# Patient Record
Sex: MALE | Race: WHITE | Employment: OTHER | URBAN - METROPOLITAN AREA
[De-identification: names, ages, dates, MRNs, and addresses within clinical notes are randomized per-mention and may not be internally consistent; named-entity substitution may affect disease eponyms.]

---

## 2017-01-09 ENCOUNTER — CARE COORDINATION (OUTPATIENT)
Dept: CARE COORDINATION | Age: 52
End: 2017-01-09

## 2017-01-17 ENCOUNTER — TELEPHONE (OUTPATIENT)
Dept: ADMISSION | Age: 52
End: 2017-01-17

## 2017-01-31 ENCOUNTER — TELEPHONE (OUTPATIENT)
Dept: INFECTIOUS DISEASES | Age: 52
End: 2017-01-31

## 2017-03-22 ENCOUNTER — TELEPHONE (OUTPATIENT)
Dept: INFECTIOUS DISEASES | Age: 52
End: 2017-03-22

## 2017-06-26 DIAGNOSIS — T50.902A SUICIDE ATTEMPT BY DRUG INGESTION, INITIAL ENCOUNTER (HCC): Primary | ICD-10-CM

## 2017-06-26 DIAGNOSIS — E78.9 LIPID DISORDER: ICD-10-CM

## 2017-06-26 DIAGNOSIS — B20 HUMAN IMMUNODEFICIENCY VIRUS INFECTION (HCC): ICD-10-CM

## 2017-06-26 DIAGNOSIS — Z13.220 LIPID SCREENING: ICD-10-CM

## 2017-06-26 DIAGNOSIS — E55.9 VITAMIN D DEFICIENCY: ICD-10-CM

## 2017-06-27 DIAGNOSIS — Z13.220 LIPID SCREENING: ICD-10-CM

## 2017-06-27 DIAGNOSIS — E78.9 LIPID DISORDER: ICD-10-CM

## 2017-06-27 DIAGNOSIS — B20 HUMAN IMMUNODEFICIENCY VIRUS INFECTION (HCC): ICD-10-CM

## 2017-06-27 DIAGNOSIS — T50.902A SUICIDE ATTEMPT BY DRUG INGESTION, INITIAL ENCOUNTER (HCC): ICD-10-CM

## 2017-06-27 DIAGNOSIS — E55.9 VITAMIN D DEFICIENCY: ICD-10-CM

## 2017-06-27 LAB
ALBUMIN SERPL-MCNC: 4.3 G/DL (ref 3.9–4.9)
ALP BLD-CCNC: 125 U/L (ref 35–104)
ALT SERPL-CCNC: 17 U/L (ref 0–41)
ANION GAP SERPL CALCULATED.3IONS-SCNC: 14 MEQ/L (ref 7–13)
AST SERPL-CCNC: 17 U/L (ref 0–40)
BILIRUB SERPL-MCNC: 0.3 MG/DL (ref 0–1.2)
BUN BLDV-MCNC: 13 MG/DL (ref 6–20)
CALCIUM SERPL-MCNC: 9.2 MG/DL (ref 8.6–10.2)
CHLORIDE BLD-SCNC: 98 MEQ/L (ref 98–107)
CHOLESTEROL, TOTAL: 179 MG/DL (ref 0–199)
CO2: 24 MEQ/L (ref 22–29)
CREAT SERPL-MCNC: 1.2 MG/DL (ref 0.7–1.2)
GFR AFRICAN AMERICAN: >60
GFR NON-AFRICAN AMERICAN: >60
GLOBULIN: 3.9 G/DL (ref 2.3–3.5)
GLUCOSE BLD-MCNC: 79 MG/DL (ref 74–109)
HCT VFR BLD CALC: 51.6 % (ref 42–52)
HDLC SERPL-MCNC: 36 MG/DL (ref 40–59)
HEMOGLOBIN: 17.3 G/DL (ref 14–18)
HEPATITIS C ANTIBODY INTERPRETATION: NORMAL
LDL CHOLESTEROL CALCULATED: 106 MG/DL (ref 0–129)
MCH RBC QN AUTO: 29.8 PG (ref 27–31.3)
MCHC RBC AUTO-ENTMCNC: 33.5 % (ref 33–37)
MCV RBC AUTO: 89 FL (ref 80–100)
PDW BLD-RTO: 13.7 % (ref 11.5–14.5)
PLATELET # BLD: 176 K/UL (ref 130–400)
POTASSIUM SERPL-SCNC: 5 MEQ/L (ref 3.5–5.1)
RBC # BLD: 5.8 M/UL (ref 4.7–6.1)
SODIUM BLD-SCNC: 136 MEQ/L (ref 132–144)
TOTAL PROTEIN: 8.2 G/DL (ref 6.4–8.1)
TRIGL SERPL-MCNC: 186 MG/DL (ref 0–200)
VITAMIN D 25-HYDROXY: 17.3 NG/ML (ref 30–100)
WBC # BLD: 6.4 K/UL (ref 4.8–10.8)

## 2017-06-28 LAB
ABSOLUTE CD 4 HELPER: 326 CELLS/UL (ref 430–1800)
C. TRACHOMATIS DNA ,URINE: NEGATIVE
CD4 % HELPER T CELL: 17 % (ref 32–64)
LYMPHOCYTE SUBSET PANEL 2 INFO: ABNORMAL
N. GONORRHOEAE DNA, URINE: NEGATIVE

## 2017-06-29 LAB
HIV RNA PCR INTERPRETATION: DETECTED
HIV-1 RNA BY PCR, QN: 4 LOG
HIV-1 RNA BY PCR, QN: ABNORMAL CPY/ML

## 2017-06-30 LAB
G6PD ALLELE 1: NEGATIVE
G6PD ALLELE 2: NORMAL
G6PD MUTATION INTERPRETATION: NORMAL
G6PD SPECIMEN: NORMAL

## 2017-07-03 LAB
EER HIV GENOTYPING: NORMAL
HIV-1 GENOTYPE: NORMAL

## 2017-07-17 ENCOUNTER — TELEPHONE (OUTPATIENT)
Dept: INFECTIOUS DISEASES | Age: 52
End: 2017-07-17

## 2017-07-19 ENCOUNTER — OFFICE VISIT (OUTPATIENT)
Dept: INFECTIOUS DISEASES | Age: 52
End: 2017-07-19

## 2017-07-19 VITALS
HEIGHT: 69 IN | BODY MASS INDEX: 32.14 KG/M2 | RESPIRATION RATE: 16 BRPM | HEART RATE: 75 BPM | WEIGHT: 217 LBS | TEMPERATURE: 97.9 F | SYSTOLIC BLOOD PRESSURE: 106 MMHG | DIASTOLIC BLOOD PRESSURE: 76 MMHG

## 2017-07-19 DIAGNOSIS — N18.2 CRF (CHRONIC RENAL FAILURE), STAGE 2 (MILD): ICD-10-CM

## 2017-07-19 DIAGNOSIS — B20 HUMAN IMMUNODEFICIENCY VIRUS INFECTION (HCC): Primary | ICD-10-CM

## 2017-07-19 DIAGNOSIS — F14.90 CRACK COCAINE USE: ICD-10-CM

## 2017-07-19 DIAGNOSIS — Z72.0 TOBACCO ABUSE: ICD-10-CM

## 2017-07-19 DIAGNOSIS — E55.9 VITAMIN D DEFICIENCY: ICD-10-CM

## 2017-07-19 DIAGNOSIS — F33.2 SEVERE EPISODE OF RECURRENT MAJOR DEPRESSIVE DISORDER, WITHOUT PSYCHOTIC FEATURES (HCC): ICD-10-CM

## 2017-07-19 DIAGNOSIS — T50.902A SUICIDE ATTEMPT BY DRUG INGESTION, INITIAL ENCOUNTER (HCC): ICD-10-CM

## 2017-07-19 PROCEDURE — G8427 DOCREV CUR MEDS BY ELIG CLIN: HCPCS | Performed by: INTERNAL MEDICINE

## 2017-07-19 PROCEDURE — 4004F PT TOBACCO SCREEN RCVD TLK: CPT | Performed by: INTERNAL MEDICINE

## 2017-07-19 PROCEDURE — 99215 OFFICE O/P EST HI 40 MIN: CPT | Performed by: INTERNAL MEDICINE

## 2017-07-19 PROCEDURE — G8417 CALC BMI ABV UP PARAM F/U: HCPCS | Performed by: INTERNAL MEDICINE

## 2017-07-19 PROCEDURE — 3017F COLORECTAL CA SCREEN DOC REV: CPT | Performed by: INTERNAL MEDICINE

## 2017-07-19 ASSESSMENT — PATIENT HEALTH QUESTIONNAIRE - PHQ9
SUM OF ALL RESPONSES TO PHQ QUESTIONS 1-9: 1
SUM OF ALL RESPONSES TO PHQ9 QUESTIONS 1 & 2: 1
2. FEELING DOWN, DEPRESSED OR HOPELESS: 1
1. LITTLE INTEREST OR PLEASURE IN DOING THINGS: 0

## 2017-10-17 DIAGNOSIS — B20 HUMAN IMMUNODEFICIENCY VIRUS INFECTION (HCC): ICD-10-CM

## 2017-10-17 LAB
ALBUMIN SERPL-MCNC: 4.3 G/DL (ref 3.9–4.9)
ALP BLD-CCNC: 126 U/L (ref 35–104)
ALT SERPL-CCNC: 21 U/L (ref 0–41)
ANION GAP SERPL CALCULATED.3IONS-SCNC: 15 MEQ/L (ref 7–13)
AST SERPL-CCNC: 22 U/L (ref 0–40)
BILIRUB SERPL-MCNC: 0.4 MG/DL (ref 0–1.2)
BUN BLDV-MCNC: 11 MG/DL (ref 6–20)
CALCIUM SERPL-MCNC: 9.7 MG/DL (ref 8.6–10.2)
CHLORIDE BLD-SCNC: 101 MEQ/L (ref 98–107)
CO2: 28 MEQ/L (ref 22–29)
CREAT SERPL-MCNC: 1.23 MG/DL (ref 0.7–1.2)
GFR AFRICAN AMERICAN: >60
GFR NON-AFRICAN AMERICAN: >60
GLOBULIN: 4.1 G/DL (ref 2.3–3.5)
GLUCOSE BLD-MCNC: 85 MG/DL (ref 74–109)
HCT VFR BLD CALC: 53.7 % (ref 42–52)
HEMOGLOBIN: 17.8 G/DL (ref 14–18)
MCH RBC QN AUTO: 29.4 PG (ref 27–31.3)
MCHC RBC AUTO-ENTMCNC: 33.2 % (ref 33–37)
MCV RBC AUTO: 88.7 FL (ref 80–100)
PDW BLD-RTO: 14.9 % (ref 11.5–14.5)
PLATELET # BLD: 188 K/UL (ref 130–400)
POTASSIUM SERPL-SCNC: 5.1 MEQ/L (ref 3.5–5.1)
RBC # BLD: 6.06 M/UL (ref 4.7–6.1)
SODIUM BLD-SCNC: 144 MEQ/L (ref 132–144)
TOTAL PROTEIN: 8.4 G/DL (ref 6.4–8.1)
WBC # BLD: 6.8 K/UL (ref 4.8–10.8)

## 2017-10-18 LAB
ABSOLUTE CD 4 HELPER: 309 CELLS/UL (ref 430–1800)
CD4 % HELPER T CELL: 20 % (ref 32–64)
LYMPHOCYTE SUBSET PANEL 2 INFO: ABNORMAL

## 2017-10-19 PROBLEM — L03.115 CELLULITIS OF RIGHT LOWER EXTREMITY: Status: ACTIVE | Noted: 2017-07-14

## 2017-10-19 LAB
HIV RNA PCR INTERPRETATION: DETECTED
HIV-1 RNA BY PCR, QN: 3.8 LOG
HIV-1 RNA BY PCR, QN: ABNORMAL CPY/ML

## 2017-10-19 RX ORDER — METHOCARBAMOL 750 MG/1
TABLET ORAL
Refills: 0 | COMMUNITY
Start: 2017-07-19 | End: 2018-10-09 | Stop reason: ALTCHOICE

## 2017-10-19 RX ORDER — SULFAMETHOXAZOLE AND TRIMETHOPRIM 800; 160 MG/1; MG/1
TABLET ORAL
Refills: 0 | COMMUNITY
Start: 2017-07-19 | End: 2018-03-09 | Stop reason: SDUPTHER

## 2017-10-19 RX ORDER — ABACAVIR SULFATE, DOLUTEGRAVIR SODIUM, LAMIVUDINE 600; 50; 300 MG/1; MG/1; MG/1
TABLET, FILM COATED ORAL
Refills: 0 | COMMUNITY
Start: 2017-07-20 | End: 2018-10-09

## 2017-10-20 ENCOUNTER — OFFICE VISIT (OUTPATIENT)
Dept: INFECTIOUS DISEASES | Age: 52
End: 2017-10-20

## 2017-10-20 VITALS
HEIGHT: 69 IN | DIASTOLIC BLOOD PRESSURE: 90 MMHG | SYSTOLIC BLOOD PRESSURE: 134 MMHG | HEART RATE: 105 BPM | TEMPERATURE: 97.5 F | RESPIRATION RATE: 20 BRPM | WEIGHT: 198.6 LBS | BODY MASS INDEX: 29.41 KG/M2

## 2017-10-20 DIAGNOSIS — E55.9 VITAMIN D DEFICIENCY: ICD-10-CM

## 2017-10-20 DIAGNOSIS — Z91.199 NONCOMPLIANCE: ICD-10-CM

## 2017-10-20 DIAGNOSIS — F14.90 CRACK COCAINE USE: ICD-10-CM

## 2017-10-20 DIAGNOSIS — L03.115 CELLULITIS OF RIGHT FOOT: Primary | ICD-10-CM

## 2017-10-20 DIAGNOSIS — B20 HIV (HUMAN IMMUNODEFICIENCY VIRUS INFECTION) (HCC): ICD-10-CM

## 2017-10-20 DIAGNOSIS — F32.A DEPRESSION, UNSPECIFIED DEPRESSION TYPE: ICD-10-CM

## 2017-10-20 DIAGNOSIS — L02.611 ABSCESS OF RIGHT FOOT: ICD-10-CM

## 2017-10-20 PROCEDURE — 3017F COLORECTAL CA SCREEN DOC REV: CPT | Performed by: INTERNAL MEDICINE

## 2017-10-20 PROCEDURE — 99215 OFFICE O/P EST HI 40 MIN: CPT | Performed by: INTERNAL MEDICINE

## 2017-10-20 PROCEDURE — 4004F PT TOBACCO SCREEN RCVD TLK: CPT | Performed by: INTERNAL MEDICINE

## 2017-10-20 PROCEDURE — G8427 DOCREV CUR MEDS BY ELIG CLIN: HCPCS | Performed by: INTERNAL MEDICINE

## 2017-10-20 PROCEDURE — G8417 CALC BMI ABV UP PARAM F/U: HCPCS | Performed by: INTERNAL MEDICINE

## 2017-10-20 PROCEDURE — G8484 FLU IMMUNIZE NO ADMIN: HCPCS | Performed by: INTERNAL MEDICINE

## 2017-10-20 RX ORDER — LEVOFLOXACIN 500 MG/1
500 TABLET, FILM COATED ORAL DAILY
Qty: 10 TABLET | Refills: 0 | Status: SHIPPED | OUTPATIENT
Start: 2017-10-20 | End: 2017-10-30

## 2017-10-20 NOTE — PROGRESS NOTES
Systems:    All 14 systems reviewed with patient and are negative other than as stated above. Medication History  Current Outpatient Prescriptions   Medication Sig Dispense Refill    D3-50 72226 units CAPS take 1 capsule by mouth weekly  0    TRIUMEQ 600- MG TABS   0    sulfamethoxazole-trimethoprim (BACTRIM DS;SEPTRA DS) 800-160 MG per tablet take 1 tablet by mouth three times a week (MONDAY, WEDNESDAY, FRIDAY)  0    sertraline (ZOLOFT) 50 MG tablet Take 1 tablet by mouth daily 15 tablet 3    albuterol sulfate HFA (VENTOLIN HFA) 108 (90 BASE) MCG/ACT inhaler Inhale 2 puffs into the lungs every 6 hours as needed for Wheezing 1 Inhaler 11     No current facility-administered medications for this visit.         Past Medical History  Past Medical History:   Diagnosis Date    Anxiety     [facility] [address]     Cellulitis of heel, right 2016    after surgery x 3 on tendion     Depression     Erectile dysfunction     Headache     HIV [facility] immunodeficiency virus infection) ([facility] [address] [facility]     Liver disease     Osteoarthritis        Past Surgical History  Past Surgical History:   Procedure Laterality Date    CHOLECYSTECTOMY      FOOT SURGERY      bone extraction    FRACTURE SURGERY         Allergies  Allergies   Allergen Reactions    Methadone Other (See Comments)     Chest pain    Morphine Other (See Comments)     Chest pains    Nexium [Esomeprazole Magnesium] Nausea Only     Upset stomach    Omeprazole Nausea Only     Upset stomach       Family History  Family History   Problem Relation Age of Onset    Family history unknown: Yes       Immunization History  Immunization History   Administered Date(s) Administered    Hepatitis B, unspecified formulation 06/09/2005, 07/01/2005, 12/02/2005    Influenza A (H1N1) Vaccine IM 11/25/2009    Influenza Vaccine, unspecified formulation 09/15/2014    Influenza Virus Vaccine 10/19/2011    Influenza Whole 01/26/2011    PPD Test 01/26/2011, 03/20/2012, 06/08/2015  Pneumococcal Conjugate 7-valent 03/16/2009    Tetanus 10/19/2006       Physical Exam  Vitals:    10/20/17 1404   BP: (!) 134/90   Pulse: 105   Resp: 20   Temp: 97.5 °F (36.4 °C)       General: Patient appears disheveled, weeping. Skin: rashes on his back - macular - look like a fungal rash. Not on anterior torso. HEENT: EOMI, Neck supple  Heart: S1 S2  Lungs: clear bilaterally to auscultation  Abdomen: soft, ND  Extrem:no calf pain  Neuro exam: CN II-XII intact    Current labs  Lab Results   Component Value Date    NX9WYLO 440 12/17/2012    CB1FVJF 683 06/08/2012    UM1OCRM 547 03/07/2012    TH4LYJB 611 12/12/2011         Chemistry        Component Value Date/Time     10/17/2017 1030    K 5.1 10/17/2017 1030     10/17/2017 1030    CO2 28 10/17/2017 1030    BUN 11 10/17/2017 1030    CREATININE 1.23 (H) 10/17/2017 1030        Component Value Date/Time    CALCIUM 9.7 10/17/2017 1030    ALKPHOS 126 (H) 10/17/2017 1030    AST 22 10/17/2017 1030    ALT 21 10/17/2017 1030    BILITOT 0.4 10/17/2017 1030          No components found for: CHLPL  Lab Results   Component Value Date    TRIG 186 06/27/2017    TRIG 144 11/20/2015    TRIG 155 06/05/2015     Lab Results   Component Value Date    HDL 36 (L) 06/27/2017    HDL 38 (L) 11/20/2015    HDL 29 (L) 06/05/2015     Lab Results   Component Value Date    LDLCALC 106 06/27/2017    LDLCALC 98 11/20/2015    LDLCALC 76 06/05/2015     No results found for: LABVLDL  Lab Results   Component Value Date    HEPBCAB NEGATIVE 12/12/2011     Lab Results   Component Value Date    RPR NON REAC 03/07/2012       Impression/Plan  Abscess of R heel area - very mild fluctuance of the area; no surrounding cellulitis. Patient should have repeat MRI and abx course. He said he will try to take it. HIV infection - uncontrolled with viremia. Stop all HIV meds as he is noncompliant at this time.    Suicide attempt history - will need to be vigilant about conversations regarding this

## 2017-11-15 ENCOUNTER — OFFICE VISIT (OUTPATIENT)
Dept: INFECTIOUS DISEASES | Age: 52
End: 2017-11-15

## 2017-11-15 VITALS
BODY MASS INDEX: 29.44 KG/M2 | DIASTOLIC BLOOD PRESSURE: 87 MMHG | TEMPERATURE: 98.2 F | WEIGHT: 198.8 LBS | SYSTOLIC BLOOD PRESSURE: 123 MMHG | HEIGHT: 69 IN | RESPIRATION RATE: 20 BRPM | HEART RATE: 96 BPM

## 2017-11-15 DIAGNOSIS — F32.A DEPRESSION WITH SUICIDAL IDEATION: ICD-10-CM

## 2017-11-15 DIAGNOSIS — L03.115 CELLULITIS OF RIGHT LOWER EXTREMITY: ICD-10-CM

## 2017-11-15 DIAGNOSIS — L02.611 ABSCESS OF RIGHT FOOT: ICD-10-CM

## 2017-11-15 DIAGNOSIS — F19.90 DRUG USE: ICD-10-CM

## 2017-11-15 DIAGNOSIS — B20 HUMAN IMMUNODEFICIENCY VIRUS INFECTION (HCC): Primary | ICD-10-CM

## 2017-11-15 DIAGNOSIS — R45.851 DEPRESSION WITH SUICIDAL IDEATION: ICD-10-CM

## 2017-11-15 PROCEDURE — 3017F COLORECTAL CA SCREEN DOC REV: CPT | Performed by: INTERNAL MEDICINE

## 2017-11-15 PROCEDURE — G8427 DOCREV CUR MEDS BY ELIG CLIN: HCPCS | Performed by: INTERNAL MEDICINE

## 2017-11-15 PROCEDURE — 4004F PT TOBACCO SCREEN RCVD TLK: CPT | Performed by: INTERNAL MEDICINE

## 2017-11-15 PROCEDURE — 99214 OFFICE O/P EST MOD 30 MIN: CPT | Performed by: INTERNAL MEDICINE

## 2017-11-15 PROCEDURE — G8417 CALC BMI ABV UP PARAM F/U: HCPCS | Performed by: INTERNAL MEDICINE

## 2017-11-15 PROCEDURE — G8484 FLU IMMUNIZE NO ADMIN: HCPCS | Performed by: INTERNAL MEDICINE

## 2017-11-15 NOTE — PROGRESS NOTES
Initial HIV Consult      Patient Name: Maggie Baker  YOB: 1965  Patient Sex: male  Medical Record Number: [id number]        Background:    Patient was diagnosed with HIV in 2000 in [place]. Had thrush at the time. HIs CD4 was noted to be 328 and genotype was negative for any resistance. Started on Combivir and Sustiva but quit taking meds a few years later. In 2007 was started on Atripla but stopped 3-4 years later. Then started taking Triumeq about 3 years ago but stopped taking that as well within a few years. Hx of crack use - $20 per day. Smokes 2-3 pacs of cigarettes at least per day. Has had over 100 sexual partners over his lifetime - males. Hx of multiple suicide attempts, including overdose of pills including lyrica, and still tells me that one day he intends to commit suicide when he is \"done with everything. \"  Does not like taking pills. States that he was seeing a psychiatrist but they put him on pills, which he did not take. And the pills only contributed to his depression. He has been institutionalized a few times for depression and suicide attempts. No STD history, he is edentulous, has a family hx of colon cancer and skin cancers. He himself recently had neck and lip squamous and basal cell cancers removed. Patient is weepy today - states that his drug problem is out of control. No IVDU. Smoking crack daily. Stopped taking all the rest of his medications. Wants inpatient rehab. No suicide plan. Does NOT want to go to the ER and adamantly refuses inpatient psych admission. Tells me that he has an abscess of the right achilles region. States that the pain, erythema and mild fluctuance started a few weeks ago. Tells me that he had OM of that foot at one point with tunneling and may have been subtreated. Has not had MRI recently. States that he also had a past L achilles injury. No fevers or chills. No surrounding erythema of the R achilles.      Review of Systems:    All 14 systems reviewed with patient and are negative other than as stated above. Medication History  Current Outpatient Prescriptions   Medication Sig Dispense Refill    D3-50 14683 units CAPS take 1 capsule by mouth weekly  0    TRIUMEQ 600- MG TABS   0    sulfamethoxazole-trimethoprim (BACTRIM DS;SEPTRA DS) 800-160 MG per tablet take 1 tablet by mouth three times a week (MONDAY, WEDNESDAY, FRIDAY)  0    sertraline (ZOLOFT) 50 MG tablet Take 1 tablet by mouth daily 15 tablet 3    albuterol sulfate HFA (VENTOLIN HFA) 108 (90 BASE) MCG/ACT inhaler Inhale 2 puffs into the lungs every 6 hours as needed for Wheezing 1 Inhaler 11     No current facility-administered medications for this visit.         Past Medical History  Past Medical History:   Diagnosis Date    Anxiety     [facility] ([facility] [address]     Cellulitis of heel, right 2016    after surgery x 3 on tendion     Depression     Erectile dysfunction     Headache(784.0)     HIV (human immunodeficiency virus infection) ([facility] [address]     Liver disease     Osteoarthritis        Past Surgical History  Past Surgical History:   Procedure Laterality Date    CHOLECYSTECTOMY      FOOT SURGERY      bone extraction    FRACTURE SURGERY         Allergies  Allergies   Allergen Reactions    Methadone Other (See Comments)     Chest pain    Morphine Other (See Comments)     Chest pains    Nexium [Esomeprazole Magnesium] Nausea Only     Upset stomach    Omeprazole Nausea Only     Upset stomach       Family History  Family History   Problem Relation Age of Onset    Family history unknown: Yes       Immunization History  Immunization History   Administered Date(s) Administered    Hepatitis B, unspecified formulation 06/09/2005, 07/01/2005, 12/02/2005    Influenza A (H1N1) Vaccine IM 11/25/2009    Influenza Vaccine, unspecified formulation 09/15/2014    Influenza Virus Vaccine 10/19/2011    Influenza Whole 01/26/2011    PPD Test 01/26/2011, 03/20/2012, 06/08/2015    Pneumococcal Conjugate 7-valent 03/16/2009    Tetanus 10/19/2006       Physical Exam  Vitals:    11/15/17 1312   BP: 123/87   Pulse:    Resp:    Temp:        General: Patient appears disheveled, weeping. Skin: rashes on his back - macular - look like a fungal rash. Not on anterior torso. HEENT: EOMI, Neck supple  Heart: S1 S2  Lungs: clear bilaterally to auscultation  Abdomen: soft, ND  Extrem:no calf pain  Neuro exam: CN II-XII intact    Current labs  Lab Results   Component Value Date    RA2TXVB 440 12/17/2012    XW1IKFT 683 06/08/2012    UA9ZVJD 547 03/07/2012    SB4MVEI 611 12/12/2011         Chemistry        Component Value Date/Time     10/17/2017 1030    K 5.1 10/17/2017 1030     10/17/2017 1030    CO2 28 10/17/2017 1030    BUN 11 10/17/2017 1030    CREATININE 1.23 (H) 10/17/2017 1030        Component Value Date/Time    CALCIUM 9.7 10/17/2017 1030    ALKPHOS 126 (H) 10/17/2017 1030    AST 22 10/17/2017 1030    ALT 21 10/17/2017 1030    BILITOT 0.4 10/17/2017 1030          No components found for: CHLPL  Lab Results   Component Value Date    TRIG 186 06/27/2017    TRIG 144 11/20/2015    TRIG 155 06/05/2015     Lab Results   Component Value Date    HDL 36 (L) 06/27/2017    HDL 38 (L) 11/20/2015    HDL 29 (L) 06/05/2015     Lab Results   Component Value Date    LDLCALC 106 06/27/2017    LDLCALC 98 11/20/2015    LDLCALC 76 06/05/2015     No results found for: LABVLDL  Lab Results   Component Value Date    HEPBCAB NEGATIVE 12/12/2011     Lab Results   Component Value Date    RPR NON REAC 03/07/2012       Impression/Plan  Abscess of R heel area - very mild fluctuance of the area; no surrounding cellulitis. Patient should have repeat MRI and abx course. He said he will try to take it. HIV infection - uncontrolled with viremia. Stop all HIV meds as he is noncompliant at this time.    Suicide attempt history - will need to be vigilant about conversations regarding this with this patient. Frequent check ins if needed. No plan  Crack use - this is the biggest issue - recommend ER and intake asap for major depression and drug use but patient refusing stating he wants inpatientt rehab. Not currently on any wait lists. Refuses outpatient rehab. Continues to use daily. Genotype reviewed - some NNRTI resistance. Hx of renal injury  Tobacco use  vitmain D deficiency - is not taking vitamin D     Plan to check CD4; Viral load   Plan to check basic labs:  CBC, Chemistry,   Plan to check genotype      Prophylaxis needed: Bactrim until his percent comes up       Prevention  Substance abuse screening performed:   Crack  Counseling provided during this visit > 50% yes  Brief Mental Health Screening performed:  Yes - suicide attempts  Physical abuse screening performed: yes - none    Patient has been informed of the importance of protection during sexual encounters which include, but are not limited to vaginal intercourse, anal intercourse, and oral sex. Patient has also been made aware that it is a felony in the [address] to engage in a sexual encounter without first disclosing HIV status to partner.     Plans for next visit:    Needs eye doc visit  Needs a colonoscopy due to family hx of colon cancer  Needs a PCP  Needs a dermatology referral due to skin cancers  Needs pain management    Follow up: 3 months    Time spent with patient: 36 min    Gwen Benavides

## 2017-11-15 NOTE — PROGRESS NOTES
illicits. Encouraged patient to discuss this with Dr Ellen Zeng. No fevers or chills. Review of Systems:    All 14 systems reviewed with patient and are negative other than as stated above. Medication History  Current Outpatient Prescriptions   Medication Sig Dispense Refill    D3-50 75996 units CAPS take 1 capsule by mouth weekly  0    TRIUMEQ 600- MG TABS   0    sulfamethoxazole-trimethoprim (BACTRIM DS;SEPTRA DS) 800-160 MG per tablet take 1 tablet by mouth three times a week (MONDAY, WEDNESDAY, FRIDAY)  0    sertraline (ZOLOFT) 50 MG tablet Take 1 tablet by mouth daily 15 tablet 3    albuterol sulfate HFA (VENTOLIN HFA) 108 (90 BASE) MCG/ACT inhaler Inhale 2 puffs into the lungs every 6 hours as needed for Wheezing 1 Inhaler 11     No current facility-administered medications for this visit.         Past Medical History  Past Medical History:   Diagnosis Date    Anxiety     [facility] ([facility] [address]     Cellulitis of heel, right 2016    after surgery x 3 on tendion     Depression     Erectile dysfunction     Headache(784.0)     HIV (human immunodeficiency virus infection) ([facility] [address]     Liver disease     Osteoarthritis        Past Surgical History  Past Surgical History:   Procedure Laterality Date    CHOLECYSTECTOMY      FOOT SURGERY      bone extraction    FRACTURE SURGERY         Allergies  Allergies   Allergen Reactions    Methadone Other (See Comments)     Chest pain    Morphine Other (See Comments)     Chest pains    Nexium [Esomeprazole Magnesium] Nausea Only     Upset stomach    Omeprazole Nausea Only     Upset stomach       Family History  Family History   Problem Relation Age of Onset    Family history unknown: Yes       Immunization History  Immunization History   Administered Date(s) Administered    Hepatitis B, unspecified formulation 06/09/2005, 07/01/2005, 12/02/2005    Influenza A (H1N1) Vaccine IM 11/25/2009    Influenza Vaccine, unspecified formulation 09/15/2014    infection - uncontrolled with viremia. Stopped all HIV meds as he is noncompliant at this time. I have asked that he resume his HIV meds especially if he will be undergoing surgery. Patient states that he will think about it. Suicide attempt history - will need to be vigilant about conversations regarding this with this patient. Frequent check ins if needed. No plan  Crack use - this is the biggest issue - recommend ER and intake asap for major depression and drug use but patient refusing. He is going back and forth between outpatient and inpatient rehab. Genotype reviewed - some NNRTI resistance. Hx of renal injury  Tobacco use  vitmain D deficiency - is not taking vitamin D     Plan to check CD4; Viral load   Plan to check basic labs:  CBC, Chemistry,   Plan to check genotype      Prophylaxis needed: Bactrim until his percent comes up       Prevention  Substance abuse screening performed:   Crack  Counseling provided during this visit > 50% yes  Brief Mental Health Screening performed:  Yes - suicide attempts  Physical abuse screening performed: yes - none    Patient has been informed of the importance of protection during sexual encounters which include, but are not limited to vaginal intercourse, anal intercourse, and oral sex. Patient has also been made aware that it is a felony in the [address] to engage in a sexual encounter without first disclosing HIV status to partner. Plans for next visit:    Needs eye doc visit  Needs a colonoscopy due to family hx of colon cancer  Needs a PCP  Needs a dermatology referral due to skin cancers  Needs pain management    Follow up: will need nurse visit in 3 weeks just to assess his mentation. Can follow up from HIV standpoint in 3 months.      Time spent with patient: 35 min    Qiana5 Jesus Sheikh

## 2017-11-20 ENCOUNTER — HOSPITAL ENCOUNTER (OUTPATIENT)
Dept: MRI IMAGING | Age: 52
Discharge: HOME | End: 2017-11-20
Payer: MEDICARE

## 2017-11-20 DIAGNOSIS — L03.115 CELLULITIS OF RIGHT FOOT: ICD-10-CM

## 2017-11-22 ENCOUNTER — CARE COORDINATION (OUTPATIENT)
Dept: CARE COORDINATION | Age: 52
End: 2017-11-22

## 2018-01-30 PROBLEM — F14.90 CRACK COCAINE USE: Status: ACTIVE | Noted: 2017-07-18

## 2018-01-30 PROBLEM — L03.90 CELLULITIS: Status: ACTIVE | Noted: 2017-06-26

## 2018-02-14 ENCOUNTER — NURSE ONLY (OUTPATIENT)
Dept: INFECTIOUS DISEASES | Age: 53
End: 2018-02-14

## 2018-02-14 DIAGNOSIS — Z01.89 ROUTINE LAB DRAW: Primary | ICD-10-CM

## 2018-02-14 DIAGNOSIS — Z12.5 ENCOUNTER FOR SCREENING FOR MALIGNANT NEOPLASM OF PROSTATE: ICD-10-CM

## 2018-02-14 DIAGNOSIS — B20 HUMAN IMMUNODEFICIENCY VIRUS INFECTION (HCC): ICD-10-CM

## 2018-02-14 DIAGNOSIS — B20 HUMAN IMMUNODEFICIENCY VIRUS INFECTION (HCC): Primary | ICD-10-CM

## 2018-02-14 LAB
ALBUMIN SERPL-MCNC: 4.3 G/DL (ref 3.9–4.9)
ALP BLD-CCNC: 116 U/L (ref 35–104)
ALT SERPL-CCNC: 27 U/L (ref 0–41)
ANION GAP SERPL CALCULATED.3IONS-SCNC: 22 MEQ/L (ref 7–13)
AST SERPL-CCNC: 39 U/L (ref 0–40)
BILIRUB SERPL-MCNC: 0.7 MG/DL (ref 0–1.2)
BUN BLDV-MCNC: 16 MG/DL (ref 6–20)
CALCIUM SERPL-MCNC: 9.6 MG/DL (ref 8.6–10.2)
CHLORIDE BLD-SCNC: 98 MEQ/L (ref 98–107)
CO2: 21 MEQ/L (ref 22–29)
CREAT SERPL-MCNC: 1.1 MG/DL (ref 0.7–1.2)
GFR AFRICAN AMERICAN: >60
GFR NON-AFRICAN AMERICAN: >60
GLOBULIN: 3.8 G/DL (ref 2.3–3.5)
GLUCOSE BLD-MCNC: 73 MG/DL (ref 74–109)
HCT VFR BLD CALC: 52.6 % (ref 42–52)
HEMOGLOBIN: 17.6 G/DL (ref 14–18)
MCH RBC QN AUTO: 30.6 PG (ref 27–31.3)
MCHC RBC AUTO-ENTMCNC: 33.4 % (ref 33–37)
MCV RBC AUTO: 91.5 FL (ref 80–100)
PDW BLD-RTO: 14.4 % (ref 11.5–14.5)
PLATELET # BLD: 200 K/UL (ref 130–400)
POTASSIUM SERPL-SCNC: 4.8 MEQ/L (ref 3.5–5.1)
PROSTATE SPECIFIC ANTIGEN: 0.44 NG/ML (ref 0–3.89)
RBC # BLD: 5.75 M/UL (ref 4.7–6.1)
SODIUM BLD-SCNC: 141 MEQ/L (ref 132–144)
TOTAL PROTEIN: 8.1 G/DL (ref 6.4–8.1)
WBC # BLD: 6.6 K/UL (ref 4.8–10.8)

## 2018-02-16 LAB
ABSOLUTE CD 4 HELPER: 301 CELLS/UL (ref 430–1800)
CD4 % HELPER T CELL: 18 % (ref 32–64)
LYMPHOCYTE SUBSET PANEL 2 INFO: ABNORMAL

## 2018-02-17 LAB
HIV RNA PCR INTERPRETATION: DETECTED
HIV-1 RNA BY PCR, QN: 3.5 LOG
HIV-1 RNA BY PCR, QN: ABNORMAL CPY/ML

## 2018-03-09 ENCOUNTER — OFFICE VISIT (OUTPATIENT)
Dept: INFECTIOUS DISEASES | Age: 53
End: 2018-03-09
Payer: MEDICARE

## 2018-03-09 VITALS
TEMPERATURE: 98.7 F | RESPIRATION RATE: 18 BRPM | HEART RATE: 67 BPM | SYSTOLIC BLOOD PRESSURE: 121 MMHG | DIASTOLIC BLOOD PRESSURE: 81 MMHG | HEIGHT: 69 IN | WEIGHT: 176.4 LBS | BODY MASS INDEX: 26.13 KG/M2

## 2018-03-09 DIAGNOSIS — M86.271 SUBACUTE OSTEOMYELITIS OF RIGHT FOOT (HCC): Primary | ICD-10-CM

## 2018-03-09 DIAGNOSIS — B20 HUMAN IMMUNODEFICIENCY VIRUS INFECTION (HCC): ICD-10-CM

## 2018-03-09 DIAGNOSIS — E78.9 LIPID DISORDER: ICD-10-CM

## 2018-03-09 DIAGNOSIS — E55.9 VITAMIN D DEFICIENCY: ICD-10-CM

## 2018-03-09 PROCEDURE — 99215 OFFICE O/P EST HI 40 MIN: CPT | Performed by: INTERNAL MEDICINE

## 2018-03-09 PROCEDURE — G8427 DOCREV CUR MEDS BY ELIG CLIN: HCPCS | Performed by: INTERNAL MEDICINE

## 2018-03-09 PROCEDURE — 4004F PT TOBACCO SCREEN RCVD TLK: CPT | Performed by: INTERNAL MEDICINE

## 2018-03-09 PROCEDURE — G8417 CALC BMI ABV UP PARAM F/U: HCPCS | Performed by: INTERNAL MEDICINE

## 2018-03-09 PROCEDURE — G8484 FLU IMMUNIZE NO ADMIN: HCPCS | Performed by: INTERNAL MEDICINE

## 2018-03-09 PROCEDURE — 3017F COLORECTAL CA SCREEN DOC REV: CPT | Performed by: INTERNAL MEDICINE

## 2018-03-09 RX ORDER — DOXYCYCLINE 100 MG/1
100 CAPSULE ORAL 2 TIMES DAILY
Qty: 120 CAPSULE | Refills: 0 | Status: SHIPPED | OUTPATIENT
Start: 2018-03-09 | End: 2018-05-08

## 2018-03-09 RX ORDER — SULFAMETHOXAZOLE AND TRIMETHOPRIM 800; 160 MG/1; MG/1
TABLET ORAL
Qty: 90 TABLET | Refills: 0 | Status: SHIPPED | OUTPATIENT
Start: 2018-03-09 | End: 2018-10-18 | Stop reason: ALTCHOICE

## 2018-03-14 ENCOUNTER — NURSE ONLY (OUTPATIENT)
Dept: INFECTIOUS DISEASES | Age: 53
End: 2018-03-14

## 2018-03-14 DIAGNOSIS — B20 HUMAN IMMUNODEFICIENCY VIRUS INFECTION (HCC): ICD-10-CM

## 2018-03-14 DIAGNOSIS — Z01.89 ROUTINE LAB DRAW: Primary | ICD-10-CM

## 2018-03-14 DIAGNOSIS — M86.271 SUBACUTE OSTEOMYELITIS OF RIGHT FOOT (HCC): ICD-10-CM

## 2018-03-14 DIAGNOSIS — E55.9 VITAMIN D DEFICIENCY: ICD-10-CM

## 2018-03-14 DIAGNOSIS — E78.9 LIPID DISORDER: ICD-10-CM

## 2018-03-14 LAB
ALBUMIN SERPL-MCNC: 3.6 G/DL (ref 3.9–4.9)
ALP BLD-CCNC: 107 U/L (ref 35–104)
ALT SERPL-CCNC: 14 U/L (ref 0–41)
ANION GAP SERPL CALCULATED.3IONS-SCNC: 16 MEQ/L (ref 7–13)
AST SERPL-CCNC: 17 U/L (ref 0–40)
BASOPHILS ABSOLUTE: 0 K/UL (ref 0–0.2)
BASOPHILS RELATIVE PERCENT: 0.7 %
BILIRUB SERPL-MCNC: 0.4 MG/DL (ref 0–1.2)
BUN BLDV-MCNC: 11 MG/DL (ref 6–20)
C-REACTIVE PROTEIN: 9.3 MG/L (ref 0–5)
CALCIUM SERPL-MCNC: 8.7 MG/DL (ref 8.6–10.2)
CHLORIDE BLD-SCNC: 98 MEQ/L (ref 98–107)
CHOLESTEROL, TOTAL: 124 MG/DL (ref 0–199)
CO2: 26 MEQ/L (ref 22–29)
CREAT SERPL-MCNC: 0.93 MG/DL (ref 0.7–1.2)
EOSINOPHILS ABSOLUTE: 0.1 K/UL (ref 0–0.7)
EOSINOPHILS RELATIVE PERCENT: 2.3 %
GFR AFRICAN AMERICAN: >60
GFR NON-AFRICAN AMERICAN: >60
GLOBULIN: 3.8 G/DL (ref 2.3–3.5)
GLUCOSE BLD-MCNC: 57 MG/DL (ref 74–109)
HCT VFR BLD CALC: 49.5 % (ref 42–52)
HDLC SERPL-MCNC: 28 MG/DL (ref 40–59)
HEMOGLOBIN: 16.4 G/DL (ref 14–18)
HEPATITIS C ANTIBODY INTERPRETATION: NORMAL
LDL CHOLESTEROL CALCULATED: 62 MG/DL (ref 0–129)
LYMPHOCYTES ABSOLUTE: 1.3 K/UL (ref 1–4.8)
LYMPHOCYTES RELATIVE PERCENT: 26.6 %
MCH RBC QN AUTO: 30.1 PG (ref 27–31.3)
MCHC RBC AUTO-ENTMCNC: 33.1 % (ref 33–37)
MCV RBC AUTO: 91.1 FL (ref 80–100)
MONOCYTES ABSOLUTE: 0.5 K/UL (ref 0.2–0.8)
MONOCYTES RELATIVE PERCENT: 10.4 %
NEUTROPHILS ABSOLUTE: 2.9 K/UL (ref 1.4–6.5)
NEUTROPHILS RELATIVE PERCENT: 60 %
PDW BLD-RTO: 14.4 % (ref 11.5–14.5)
PLATELET # BLD: 174 K/UL (ref 130–400)
POTASSIUM SERPL-SCNC: 3.9 MEQ/L (ref 3.5–5.1)
RBC # BLD: 5.44 M/UL (ref 4.7–6.1)
SODIUM BLD-SCNC: 140 MEQ/L (ref 132–144)
TOTAL PROTEIN: 7.4 G/DL (ref 6.4–8.1)
TRIGL SERPL-MCNC: 170 MG/DL (ref 0–200)
VITAMIN D 25-HYDROXY: 18.1 NG/ML (ref 30–100)
WBC # BLD: 4.8 K/UL (ref 4.8–10.8)

## 2018-03-15 LAB — RPR: NORMAL

## 2018-03-16 LAB
ABSOLUTE CD 4 HELPER: 254 CELLS/UL (ref 430–1800)
CD4 % HELPER T CELL: 17 % (ref 32–64)
HIV RNA PCR INTERPRETATION: DETECTED
HIV-1 RNA BY PCR, QN: 3.6 LOG
HIV-1 RNA BY PCR, QN: ABNORMAL CPY/ML
LYMPHOCYTE SUBSET PANEL 2 INFO: ABNORMAL

## 2018-03-17 LAB
QUANTIFERON (R) TB GOLD (INCUBATED): NEGATIVE
QUANTIFERON MITOGEN: >10 IU/ML
QUANTIFERON NIL: 0.12 IU/ML
QUANTIFERON TB AG MINUS NIL: 0.02 IU/ML (ref 0–0.34)

## 2018-03-28 ENCOUNTER — NURSE ONLY (OUTPATIENT)
Dept: INFECTIOUS DISEASES | Age: 53
End: 2018-03-28

## 2018-03-28 DIAGNOSIS — Z01.89 ROUTINE LAB DRAW: Primary | ICD-10-CM

## 2018-03-28 DIAGNOSIS — B20 HUMAN IMMUNODEFICIENCY VIRUS INFECTION (HCC): Primary | ICD-10-CM

## 2018-03-28 DIAGNOSIS — B20 HUMAN IMMUNODEFICIENCY VIRUS INFECTION (HCC): ICD-10-CM

## 2018-03-28 LAB
ALBUMIN SERPL-MCNC: 3.9 G/DL (ref 3.9–4.9)
ALP BLD-CCNC: 106 U/L (ref 35–104)
ALT SERPL-CCNC: 12 U/L (ref 0–41)
ANION GAP SERPL CALCULATED.3IONS-SCNC: 13 MEQ/L (ref 7–13)
AST SERPL-CCNC: 16 U/L (ref 0–40)
BILIRUB SERPL-MCNC: 0.3 MG/DL (ref 0–1.2)
BUN BLDV-MCNC: 11 MG/DL (ref 6–20)
CALCIUM SERPL-MCNC: 9 MG/DL (ref 8.6–10.2)
CHLORIDE BLD-SCNC: 100 MEQ/L (ref 98–107)
CO2: 29 MEQ/L (ref 22–29)
CREAT SERPL-MCNC: 1.09 MG/DL (ref 0.7–1.2)
GFR AFRICAN AMERICAN: >60
GFR NON-AFRICAN AMERICAN: >60
GLOBULIN: 3.7 G/DL (ref 2.3–3.5)
GLUCOSE BLD-MCNC: 99 MG/DL (ref 74–109)
HCT VFR BLD CALC: 49.3 % (ref 42–52)
HEMOGLOBIN: 16.5 G/DL (ref 14–18)
MCH RBC QN AUTO: 30.5 PG (ref 27–31.3)
MCHC RBC AUTO-ENTMCNC: 33.4 % (ref 33–37)
MCV RBC AUTO: 91.1 FL (ref 80–100)
PDW BLD-RTO: 14.5 % (ref 11.5–14.5)
PLATELET # BLD: 172 K/UL (ref 130–400)
POTASSIUM SERPL-SCNC: 4.6 MEQ/L (ref 3.5–5.1)
RBC # BLD: 5.41 M/UL (ref 4.7–6.1)
SODIUM BLD-SCNC: 142 MEQ/L (ref 132–144)
TOTAL PROTEIN: 7.6 G/DL (ref 6.4–8.1)
WBC # BLD: 5.3 K/UL (ref 4.8–10.8)

## 2018-09-19 ENCOUNTER — TELEPHONE (OUTPATIENT)
Dept: INFECTIOUS DISEASES | Age: 53
End: 2018-09-19

## 2018-09-19 NOTE — TELEPHONE ENCOUNTER
Pt called in to notify office he will be returning to [place] October 4th. He requested apt with RN and CM to reestablish care. Pt aware to bring all updated information possible such as ID Insurance and residency. He stated he was to secluded in [place]. Stated he has been seeing hiv MD Dr Lizette Sommer. He has not been started on any hiv medications yet. Pt stated labs were completed last month. will attempt to get records.  [employer] office [phone]. Other wise pt denies needs and will call with further questions or problems.

## 2018-10-08 ENCOUNTER — HOSPITAL ENCOUNTER (EMERGENCY)
Age: 53
Discharge: HOME | End: 2018-10-08
Attending: EMERGENCY MEDICINE
Payer: MEDICARE

## 2018-10-08 ENCOUNTER — APPOINTMENT (OUTPATIENT)
Dept: GENERAL RADIOLOGY | Age: 53
End: 2018-10-08
Payer: MEDICARE

## 2018-10-08 VITALS
SYSTOLIC BLOOD PRESSURE: 106 MMHG | HEIGHT: 69 IN | TEMPERATURE: 98.8 F | WEIGHT: 200 LBS | HEART RATE: 87 BPM | OXYGEN SATURATION: 95 % | RESPIRATION RATE: 16 BRPM | DIASTOLIC BLOOD PRESSURE: 65 MMHG | BODY MASS INDEX: 29.62 KG/M2

## 2018-10-08 DIAGNOSIS — J44.1 COPD EXACERBATION (HCC): Primary | ICD-10-CM

## 2018-10-08 LAB
ALBUMIN SERPL-MCNC: 3.7 G/DL (ref 3.9–4.9)
ALP BLD-CCNC: 86 U/L (ref 35–104)
ALT SERPL-CCNC: 9 U/L (ref 0–41)
ANION GAP SERPL CALCULATED.3IONS-SCNC: 11 MEQ/L (ref 7–13)
AST SERPL-CCNC: 15 U/L (ref 0–40)
BACTERIA: NORMAL /HPF
BILIRUB SERPL-MCNC: 0.9 MG/DL (ref 0–1.2)
BILIRUBIN URINE: ABNORMAL
BLOOD, URINE: ABNORMAL
BUN BLDV-MCNC: 11 MG/DL (ref 6–20)
CALCIUM SERPL-MCNC: 8.9 MG/DL (ref 8.6–10.2)
CHLORIDE BLD-SCNC: 94 MEQ/L (ref 98–107)
CLARITY: CLEAR
CO2: 28 MEQ/L (ref 22–29)
COLOR: ABNORMAL
CREAT SERPL-MCNC: 1.01 MG/DL (ref 0.7–1.2)
GFR AFRICAN AMERICAN: >60
GFR NON-AFRICAN AMERICAN: >60
GLOBULIN: 4 G/DL (ref 2.3–3.5)
GLUCOSE BLD-MCNC: 94 MG/DL (ref 74–109)
GLUCOSE URINE: NEGATIVE MG/DL
HCT VFR BLD CALC: 44.7 % (ref 42–52)
HEMOGLOBIN: 15.4 G/DL (ref 14–18)
KETONES, URINE: ABNORMAL MG/DL
LACTIC ACID: 1 MMOL/L (ref 0.5–2.2)
LEUKOCYTE ESTERASE, URINE: ABNORMAL
MCH RBC QN AUTO: 30.4 PG (ref 27–31.3)
MCHC RBC AUTO-ENTMCNC: 34.5 % (ref 33–37)
MCV RBC AUTO: 88 FL (ref 80–100)
MUCUS: PRESENT
NITRITE, URINE: NEGATIVE
PDW BLD-RTO: 13.3 % (ref 11.5–14.5)
PH UA: 5.5 (ref 5–9)
PLATELET # BLD: 165 K/UL (ref 130–400)
POTASSIUM SERPL-SCNC: 3.3 MEQ/L (ref 3.5–5.1)
PROTEIN UA: 30 MG/DL
RBC # BLD: 5.08 M/UL (ref 4.7–6.1)
RBC UA: NORMAL /HPF (ref 0–2)
SODIUM BLD-SCNC: 133 MEQ/L (ref 132–144)
SPECIFIC GRAVITY UA: 1.02 (ref 1–1.03)
TOTAL PROTEIN: 7.7 G/DL (ref 6.4–8.1)
URINE REFLEX TO CULTURE: YES
UROBILINOGEN, URINE: 2 E.U./DL
WBC # BLD: 7.8 K/UL (ref 4.8–10.8)
WBC UA: NORMAL /HPF (ref 0–5)

## 2018-10-08 PROCEDURE — 83605 ASSAY OF LACTIC ACID: CPT

## 2018-10-08 PROCEDURE — 87040 BLOOD CULTURE FOR BACTERIA: CPT

## 2018-10-08 PROCEDURE — 85027 COMPLETE CBC AUTOMATED: CPT

## 2018-10-08 PROCEDURE — 2580000003 HC RX 258: Performed by: EMERGENCY MEDICINE

## 2018-10-08 PROCEDURE — 99283 EMERGENCY DEPT VISIT LOW MDM: CPT

## 2018-10-08 PROCEDURE — 87086 URINE CULTURE/COLONY COUNT: CPT

## 2018-10-08 PROCEDURE — 6370000000 HC RX 637 (ALT 250 FOR IP): Performed by: EMERGENCY MEDICINE

## 2018-10-08 PROCEDURE — 80053 COMPREHEN METABOLIC PANEL: CPT

## 2018-10-08 PROCEDURE — 36415 COLL VENOUS BLD VENIPUNCTURE: CPT

## 2018-10-08 PROCEDURE — 81001 URINALYSIS AUTO W/SCOPE: CPT

## 2018-10-08 PROCEDURE — 71045 X-RAY EXAM CHEST 1 VIEW: CPT

## 2018-10-08 PROCEDURE — 94640 AIRWAY INHALATION TREATMENT: CPT

## 2018-10-08 RX ORDER — 0.9 % SODIUM CHLORIDE 0.9 %
1000 INTRAVENOUS SOLUTION INTRAVENOUS ONCE
Status: COMPLETED | OUTPATIENT
Start: 2018-10-08 | End: 2018-10-08

## 2018-10-08 RX ORDER — AMOXICILLIN 875 MG/1
875 TABLET, COATED ORAL 2 TIMES DAILY
COMMUNITY
End: 2018-10-09 | Stop reason: ALTCHOICE

## 2018-10-08 RX ORDER — ALBUTEROL SULFATE 90 UG/1
AEROSOL, METERED RESPIRATORY (INHALATION)
Qty: 1 INHALER | Refills: 1 | Status: SHIPPED | OUTPATIENT
Start: 2018-10-08 | End: 2018-10-18 | Stop reason: SDUPTHER

## 2018-10-08 RX ORDER — PREDNISONE 10 MG/1
60 TABLET ORAL DAILY
Qty: 30 TABLET | Refills: 0 | Status: SHIPPED | OUTPATIENT
Start: 2018-10-08 | End: 2018-10-13

## 2018-10-08 RX ORDER — IPRATROPIUM BROMIDE AND ALBUTEROL SULFATE 2.5; .5 MG/3ML; MG/3ML
1 SOLUTION RESPIRATORY (INHALATION) PRN
Status: DISCONTINUED | OUTPATIENT
Start: 2018-10-08 | End: 2018-10-08 | Stop reason: HOSPADM

## 2018-10-08 RX ADMIN — SODIUM CHLORIDE 1000 ML: 9 INJECTION, SOLUTION INTRAVENOUS at 14:47

## 2018-10-08 RX ADMIN — IPRATROPIUM BROMIDE AND ALBUTEROL SULFATE 1 AMPULE: .5; 3 SOLUTION RESPIRATORY (INHALATION) at 15:16

## 2018-10-09 ENCOUNTER — TELEPHONE (OUTPATIENT)
Dept: INFECTIOUS DISEASES | Age: 53
End: 2018-10-09

## 2018-10-09 ENCOUNTER — NURSE ONLY (OUTPATIENT)
Dept: INFECTIOUS DISEASES | Age: 53
End: 2018-10-09

## 2018-10-09 NOTE — PROGRESS NOTES
[address] [place] [address]   Dental Visit:  \"i still need to do that\"    Last lab work: Will need current labs. Scheduled for 10/17/18 for labs    CD4:   Lab Results   Component Value Date    LABABSO 254 03/14/2018       Viral Load:  Lab Results   Component Value Date    HSU4TZSCYAD 3,700 03/14/2018    FIO8CILCKX 3.6 03/14/2018   pt was not on medication at time of these labs either. He had refused meds at that time. Medical Insurance:  Payor: MEDICARE / Plan: MEDICARE PART A AND B / Product Type: *No Product type* /    OHDAP/HIPSCA:     Renewal Date:    Pt has part D also. Will need ODAP. Allergies:  is allergic to methadone; morphine; nexium [esomeprazole magnesium]; and omeprazole. Confirmed no changes  Current Medications:  Current Outpatient Prescriptions on File Prior to Visit   Medication Sig Dispense Refill    amoxicillin (AMOXIL) 875 MG tablet Take 875 mg by mouth 2 times daily      predniSONE (DELTASONE) 10 MG tablet Take 6 tablets by mouth daily for 5 doses 30 tablet 0    albuterol sulfate HFA (PROAIR HFA) 108 (90 Base) MCG/ACT inhaler Use every 4 hours while awake for 7-10 days then PRN wheezing  Dispense with SPACER and Instruct on use. May sub Ventolin or Proventil as needed per [employer]. 1 Inhaler 1    sulfamethoxazole-trimethoprim (BACTRIM DS;SEPTRA DS) 800-160 MG per tablet take 1 tablet by mouth three times a week (MONDAY, WEDNESDAY, FRIDAY) 90 tablet 0    D3-50 97454 units CAPS take 1 capsule by mouth weekly  0    TRIUMEQ 600- MG TABS   0    sertraline (ZOLOFT) 50 MG tablet Take 1 tablet by mouth daily 15 tablet 3     No current facility-administered medications on file prior to visit. this is not correct.  Went and re did and update medications he is only taking bactrim mwf and albuterol hfa prn    History of HIV Medications:  triumeq in past    Pharmacy:      [facility], [address]  [address] [address]  Phone: [phone] Fax: [phone]    Confirmed with pt this is current  Legal Issues:    Pt is on probation due to driving with out license multiple times. Emergency Contact:   Extended Emergency Contact Information  Primary Emergency Contact: Irean Lanes [facility] [address]  Phone: [phone]  Relation: Brother/Sister  Sister is ok to call. Pt prefers brother Deborah Neely @ [phone]  Support System: Many friends and family. RN Comments:  Pt recently moved back from [place] [facility] 10/3/18. \"bored\" in [place] had seen a doctor for HIV. No medications other than bactrim prescribed. Pt is wanting to start meds. He states that he is ready to be compliant. Discussed upcoming support group. Pt is interested. NOTE he does not want flu vaccine. He went to [facility] er yesterday thinking he was sick or had flu. Dx was copd- steriods and inhaler given. Pt states he does feel a lot better today. Return to care apt with dr Bridgett Aase scheduled for 10/24/18.

## 2018-10-09 NOTE — PROGRESS NOTES
Heterosexual sexual contact   [] Sexual abuse or assault  [] Worked in health care setting   [] Unknown -   [] If under 15, mother with HIV/AIDS  [] Transfusion of blood, receipt of blood components, or receipt of clotting factor for coagulation disorder    Current Symptoms:  [] Diarrhea [x] Chronic fatigue [x] Pain in hand(s)/feet [] Skin rashes  [x] Dizziness [] Vomiting  [x] Persistent headaches [x] Night sweats   [] Nausea [] Body aches  [] Swollen lymph nodes [] Persistent fevers  [] Weight loss in lbs -     [] Other -     Opportunistic Infections/AIDS Diagnosis:  [] Pneumocystis pneumonia (PCP) [x] T-cell count <200  [] Wasting syndrome  [] Kaposi's Sarcoma ([place])  [] Invasive Cervical Cancer [] Toxoplasmosis  [] Cryptococcal Meningitis  [] Cryptosporidiosis  [] Lymphoma-type  [] Cytomegalovirus-eyes (CMV) [] Retinitis (CMV)  [] Tuberculosis (TB)  [] Encephalopathy (HIV Dementia) [x] Candida Esophagitis [] Histoplasmosis   [] Invasive Herpes Simplex infection [] Salmonella   [x] Thrush  [] Recurrent Bacterical Pneumonia   [] Isoporiasis (with diarrhea for more than a month)  [] Disseminated Mycobacterium Avium Complex (MAC)    []  Progressive Multifocal Leukoencephalopathy (PML)  [] Other -     Histories:    Past Medical History:   Diagnosis Date    Anxiety     [facility] [address]     Cellulitis of heel, right 2016    after surgery x 3 on tendion     Depression     Erectile dysfunction     Headache(784.0)     HIV (human immunodeficiency virus infection) ([facility] [address]     Liver disease     Osteoarthritis      Past Surgical History:   Procedure Laterality Date    CHOLECYSTECTOMY      FOOT SURGERY      bone extraction    FRACTURE SURGERY       Social History     Social History    Marital status: Single     Spouse name: N/A    Number of children: N/A    Years of education: N/A     Occupational History    Not on file.      Social History Main Topics    Smoking status: Current Every Day Smoker     Packs/day: 1.00 income:  $1,160     Pending: Approved: Amount:   [address] [facility]        Social Security Disability    YES $1,160   Disability Assistance        Temporary Assistance for [employer]        Food [place]    YES $15.00   Child Support        Pension        FPC        Other -           Monthly Expenses:  $575+ approx  Total Expenses:  $575+    Item: Amount: Item: Amount: Item: Amount:   Prescriptions   Co-pay Auto [address] [facility] Telephone  Life Ins    Physician   20% Electric  Student Loan    Rent/Mortgage   $300 Gas/Oil  Cable TV    Groceries   $250 Water/  Recreactional    Transportation    Refuse  Other:         Toiletries $25.00 Other:       Case Management:    Internal Service Offered Service Requested Not Interested   Volunteer      Emergency Assistance Funds      Legal Referral      Transportation x x    Pastoral Care Referral      HIV + Support group x x    Educational materials      HIV Drug Program x     Counseling referral      Significant Other support group      Newsletter      AIDS 101 information      Safer sex information      Title I x     Title III      Title IV      Physician referral  x    Dental referral      RAJAN      Comments:       Case Management (Other Assistance):  External Eligible Involved Application Date Approval Date   Home Delivered Meals       Food [place] x x current    [facility]       Medicaid       Medicaid Waiver       Medicare x x current    DA       SSI       SSDI x x current    [employer]       Passport       Home health care       Hospice       Nursing home       Counseling       Respite care       Substance abuse Tx Program       SLAVA       Other -       Comments:     Dottie JACINTO, personally performed the services described in this documentation on 10/9/18. Electronically signed by: Dottie Mack  10/9/18 9:58 AM   Pt presented in office.   Cm and RN met with pt, completed intake. Pt recently moved back to area from [place]. Pt previous was established pt since 1999. Pt is a 51y/o GWM. Pt was diagnosed with HIV in 1999. Pt has hx of skin cancer. In 2007 had AIDS dx. Pt has history of drug use. Drug of choice is crack cocaine. Pt stated he has \"slowed down\", has used for years. Pt refuses counseling referral.  Pt has Medicare A, B and D. Currently pt is not on HIV meds. CM offered assistance via [facility] with Medicare Part D premium and med co-pays. Pt did express interest in starting meds. Pt feels no need for dental service. Pt will need transportation to appointments per guidelines. Pt has appointment for lab work 10/17/18 and appointment with Dr. Adela Treviño 10/24/18 for which pt will need transport. CM assessed need, arrangements as last resort. CM completed PSA and ISP. Pt signed in agreement with POC. Cm provided information regarding support group to be held 11/20/18. Pt expressed interest in participation. CM provided food voucher, per request, need assessed. CM will follow up with pt as needed.

## 2018-10-10 ENCOUNTER — TELEPHONE (OUTPATIENT)
Dept: INFECTIOUS DISEASES | Age: 53
End: 2018-10-10

## 2018-10-10 LAB — URINE CULTURE, ROUTINE: NORMAL

## 2018-10-11 ENCOUNTER — TELEPHONE (OUTPATIENT)
Dept: INFECTIOUS DISEASES | Age: 53
End: 2018-10-11

## 2018-10-12 ASSESSMENT — ENCOUNTER SYMPTOMS
ABDOMINAL PAIN: 0
COLOR CHANGE: 0
FACIAL SWELLING: 0
EYE DISCHARGE: 0
WHEEZING: 1
SHORTNESS OF BREATH: 1
COUGH: 1
VOMITING: 0
RHINORRHEA: 0

## 2018-10-12 NOTE — ED PROVIDER NOTES
infection) ([facility] [address]     Liver disease     Osteoarthritis          SURGICALHISTORY       Past Surgical History:   Procedure Laterality Date    CHOLECYSTECTOMY      FOOT SURGERY      bone extraction    FRACTURE SURGERY           CURRENT MEDICATIONS       Discharge Medication List as of 10/8/2018  4:52 PM      CONTINUE these medications which have NOT CHANGED    Details   amoxicillin (AMOXIL) 875 MG tablet Take 875 mg by mouth 2 times dailyHistorical Med      sulfamethoxazole-trimethoprim (BACTRIM DS;SEPTRA DS) 800-160 MG per tablet take 1 tablet by mouth three times a week (MONDAY, WEDNESDAY, FRIDAY), Disp-90 tablet, R-0Normal      D3-50 12546 units CAPS take 1 capsule by mouth weekly, R-0, DAWHistorical Med      TRIUMEQ 600- MG TABS R-0, DAWHistorical Med      sertraline (ZOLOFT) 50 MG tablet Take 1 tablet by mouth daily, Disp-15 tablet, R-3             ALLERGIES     Methadone; Morphine; Nexium [esomeprazole magnesium]; and Omeprazole    FAMILY HISTORY       Family History   Problem Relation Age of Onset    Family history unknown: Yes          SOCIAL HISTORY       Social History     Social History    Marital status: Single     Spouse name: N/A    Number of children: N/A    Years of education: N/A     Social History Main Topics    Smoking status: Current Every Day Smoker     Packs/day: 1.00     Types: Cigarettes    Smokeless tobacco: Never Used      Comment: 3-4 PPD    Alcohol use No    Drug use: Yes     Types:       Comment: crack every day     Sexual activity: Not Asked     Other Topics Concern    None     Social History Narrative    None       SCREENINGS      @FLOW([id number])@      PHYSICAL EXAM    (up to 7 for level 4, 8 or more for level 5)     ED Triage Vitals [10/08/18 1405]   BP Temp Temp Source Pulse Resp SpO2 Height Weight   112/75 98.8 °F (37.1 °C) Oral 96 20 96 % 5' 9\" (1.753 m) 200 lb (90.7 kg)       Physical Exam   Constitutional: He is oriented to person, place, and time.  He appears

## 2018-10-13 LAB
BLOOD CULTURE, ROUTINE: NORMAL
CULTURE, BLOOD 2: NORMAL

## 2018-10-15 ENCOUNTER — TELEPHONE (OUTPATIENT)
Dept: INFECTIOUS DISEASES | Age: 53
End: 2018-10-15

## 2018-10-17 ENCOUNTER — TELEPHONE (OUTPATIENT)
Dept: INFECTIOUS DISEASES | Age: 53
End: 2018-10-17

## 2018-10-17 ENCOUNTER — NURSE ONLY (OUTPATIENT)
Dept: INFECTIOUS DISEASES | Age: 53
End: 2018-10-17

## 2018-10-17 ENCOUNTER — OFFICE VISIT (OUTPATIENT)
Dept: INFECTIOUS DISEASES | Age: 53
End: 2018-10-17
Payer: MEDICARE

## 2018-10-17 DIAGNOSIS — B20 HUMAN IMMUNODEFICIENCY VIRUS (HCC): Primary | ICD-10-CM

## 2018-10-17 DIAGNOSIS — B20 HUMAN IMMUNODEFICIENCY VIRUS INFECTION (HCC): ICD-10-CM

## 2018-10-17 DIAGNOSIS — B20 HUMAN IMMUNODEFICIENCY VIRUS INFECTION (HCC): Primary | ICD-10-CM

## 2018-10-17 LAB
ALBUMIN SERPL-MCNC: 3.6 G/DL (ref 3.9–4.9)
ALP BLD-CCNC: 110 U/L (ref 35–104)
ALT SERPL-CCNC: 27 U/L (ref 0–41)
ANION GAP SERPL CALCULATED.3IONS-SCNC: 13 MEQ/L (ref 7–13)
AST SERPL-CCNC: 20 U/L (ref 0–40)
BILIRUB SERPL-MCNC: 0.3 MG/DL (ref 0–1.2)
BUN BLDV-MCNC: 12 MG/DL (ref 6–20)
CALCIUM SERPL-MCNC: 9.3 MG/DL (ref 8.6–10.2)
CHLORIDE BLD-SCNC: 92 MEQ/L (ref 98–107)
CO2: 29 MEQ/L (ref 22–29)
CREAT SERPL-MCNC: 0.97 MG/DL (ref 0.7–1.2)
GFR AFRICAN AMERICAN: >60
GFR NON-AFRICAN AMERICAN: >60
GLOBULIN: 4.3 G/DL (ref 2.3–3.5)
GLUCOSE BLD-MCNC: 96 MG/DL (ref 74–109)
HCT VFR BLD CALC: 53.1 % (ref 42–52)
HEMOGLOBIN: 17.4 G/DL (ref 14–18)
MCH RBC QN AUTO: 29.9 PG (ref 27–31.3)
MCHC RBC AUTO-ENTMCNC: 32.7 % (ref 33–37)
MCV RBC AUTO: 91.3 FL (ref 80–100)
PDW BLD-RTO: 13.9 % (ref 11.5–14.5)
PLATELET # BLD: 229 K/UL (ref 130–400)
POTASSIUM SERPL-SCNC: 4.2 MEQ/L (ref 3.5–5.1)
RBC # BLD: 5.82 M/UL (ref 4.7–6.1)
SODIUM BLD-SCNC: 134 MEQ/L (ref 132–144)
TOTAL PROTEIN: 7.9 G/DL (ref 6.4–8.1)
WBC # BLD: 7.6 K/UL (ref 4.8–10.8)

## 2018-10-17 PROCEDURE — 99211 OFF/OP EST MAY X REQ PHY/QHP: CPT | Performed by: INTERNAL MEDICINE

## 2018-10-17 NOTE — PROGRESS NOTES
Pt in for lab work. CM met with pt, discussed need to apply for [facility] for assistance with Medicare Part D premium payments and co-pay payments for meds. .  Pt will meet a representative from [facility] to set up benefits. Pt is to call for appointment with this CM to complete OHDAP application. CM informed pt that proof of premium amount will need to be provided. Pt did transfer all benefits from [place] to [address] [place] [address] Dr. BARROS provided support and encouragement regarding pt being proactive in care. CM will follow up with pt as needed.

## 2018-10-18 ENCOUNTER — OFFICE VISIT (OUTPATIENT)
Dept: PRIMARY CARE CLINIC | Age: 53
End: 2018-10-18
Payer: MEDICARE

## 2018-10-18 ENCOUNTER — TELEPHONE (OUTPATIENT)
Dept: PRIMARY CARE CLINIC | Age: 53
End: 2018-10-18

## 2018-10-18 VITALS
SYSTOLIC BLOOD PRESSURE: 100 MMHG | TEMPERATURE: 97.9 F | HEART RATE: 70 BPM | RESPIRATION RATE: 14 BRPM | OXYGEN SATURATION: 97 % | HEIGHT: 69 IN | BODY MASS INDEX: 29.62 KG/M2 | WEIGHT: 200 LBS | DIASTOLIC BLOOD PRESSURE: 70 MMHG

## 2018-10-18 DIAGNOSIS — B35.4 TINEA CORPORIS: ICD-10-CM

## 2018-10-18 DIAGNOSIS — F51.01 PRIMARY INSOMNIA: ICD-10-CM

## 2018-10-18 DIAGNOSIS — L97.412 NEUROPATHIC ULCER OF RIGHT HEEL WITH FAT LAYER EXPOSED (HCC): ICD-10-CM

## 2018-10-18 DIAGNOSIS — J44.1 COPD EXACERBATION (HCC): ICD-10-CM

## 2018-10-18 DIAGNOSIS — F33.2 MAJOR DEPRESSIVE DISORDER, RECURRENT SEVERE WITHOUT PSYCHOTIC FEATURES (HCC): ICD-10-CM

## 2018-10-18 DIAGNOSIS — B20 HUMAN IMMUNODEFICIENCY VIRUS INFECTION (HCC): Primary | ICD-10-CM

## 2018-10-18 DIAGNOSIS — J06.9 ACUTE UPPER RESPIRATORY INFECTION: ICD-10-CM

## 2018-10-18 PROCEDURE — 3023F SPIROM DOC REV: CPT | Performed by: FAMILY MEDICINE

## 2018-10-18 PROCEDURE — G8417 CALC BMI ABV UP PARAM F/U: HCPCS | Performed by: FAMILY MEDICINE

## 2018-10-18 PROCEDURE — 4004F PT TOBACCO SCREEN RCVD TLK: CPT | Performed by: FAMILY MEDICINE

## 2018-10-18 PROCEDURE — G8926 SPIRO NO PERF OR DOC: HCPCS | Performed by: FAMILY MEDICINE

## 2018-10-18 PROCEDURE — G8427 DOCREV CUR MEDS BY ELIG CLIN: HCPCS | Performed by: FAMILY MEDICINE

## 2018-10-18 PROCEDURE — 3017F COLORECTAL CA SCREEN DOC REV: CPT | Performed by: FAMILY MEDICINE

## 2018-10-18 PROCEDURE — G8484 FLU IMMUNIZE NO ADMIN: HCPCS | Performed by: FAMILY MEDICINE

## 2018-10-18 PROCEDURE — 99203 OFFICE O/P NEW LOW 30 MIN: CPT | Performed by: FAMILY MEDICINE

## 2018-10-18 RX ORDER — DOXYCYCLINE 100 MG/1
100 CAPSULE ORAL
COMMUNITY
Start: 2018-10-10 | End: 2018-10-18

## 2018-10-18 RX ORDER — ALBUTEROL SULFATE 90 UG/1
AEROSOL, METERED RESPIRATORY (INHALATION)
Qty: 1 INHALER | Refills: 1 | Status: SHIPPED | OUTPATIENT
Start: 2018-10-18 | End: 2020-12-08

## 2018-10-18 RX ORDER — DOXYCYCLINE 100 MG/1
CAPSULE ORAL
Refills: 0 | COMMUNITY
Start: 2018-10-10 | End: 2018-11-13

## 2018-10-18 RX ORDER — FLUCONAZOLE 100 MG/1
TABLET ORAL
COMMUNITY
Start: 2018-08-21 | End: 2018-10-18

## 2018-10-18 RX ORDER — DOXEPIN HYDROCHLORIDE 3 MG/1
3 TABLET ORAL NIGHTLY
Qty: 30 TABLET | Refills: 0 | Status: SHIPPED | OUTPATIENT
Start: 2018-10-18 | End: 2018-11-30 | Stop reason: ALTCHOICE

## 2018-10-18 RX ORDER — SULFAMETHOXAZOLE AND TRIMETHOPRIM 800; 160 MG/1; MG/1
TABLET ORAL
COMMUNITY
Start: 2018-03-09 | End: 2018-10-18 | Stop reason: ALTCHOICE

## 2018-10-18 RX ORDER — AMOXICILLIN 875 MG/1
875 TABLET, COATED ORAL 2 TIMES DAILY
COMMUNITY
End: 2018-10-18

## 2018-10-18 ASSESSMENT — ENCOUNTER SYMPTOMS
NAUSEA: 0
PHOTOPHOBIA: 0
RESPIRATORY NEGATIVE: 1
GASTROINTESTINAL NEGATIVE: 1
EYES NEGATIVE: 1
VOMITING: 0
DIARRHEA: 0
EYE DISCHARGE: 0
CONSTIPATION: 0
BLOOD IN STOOL: 0
CHEST TIGHTNESS: 0
BACK PAIN: 0
APNEA: 0
COUGH: 0
SHORTNESS OF BREATH: 0
ABDOMINAL PAIN: 0

## 2018-10-18 NOTE — PROGRESS NOTES
Subjective:      Patient ID: Carlotta Brizuela is a 48 y.o. male who presents today for:  Chief Complaint   Patient presents with   [address]     pt has history of chronic pain in right foot, pt was seen at [address] [facility] on 1012/18. pt was diagnosed with COPD . HPI   Establish care  Pt presents today to establish care. Pt was seen at [facility] on 10/8/18 with a dx of COPD exacerbation. He was started on prednisone and an Albuterol inhaler with mild relief. He states his breathing is not bad today. He admits to a productive cough of green sputum. He did have surgery on his right foot 3 years ago. This pain is aggravated with walking. Pt dx of Osteomyelitis. He is having surgery again on this foot. He is unsure of the date. Pt is having skin cancer surgery in November 2018 on his left forearm. He currently sees Dr. Oli Pino with infectious disease. He admits to difficulty sleeping. He was on trazodone in the past with no relief. He took this with an antidepressant. He sleeps 2-3 hours a night on average. His mother did pass away in the house he is sleeping in on 3/30/18 and he found her so this could contribute to his insomnia. Past Medical History:   Diagnosis Date    Anxiety     Cancer ([address]     Cellulitis of heel, right 2016    after surgery x 3 on tendion     Depression     Erectile dysfunction     Headache(784.0)     HIV [facility] immunodeficiency virus infection) ([facility] [address]     Liver disease     Osteoarthritis      Past Surgical History:   Procedure Laterality Date    CHOLECYSTECTOMY      FOOT SURGERY      bone extraction    FRACTURE SURGERY       Family History   Problem Relation Age of Onset    Family history unknown: Yes     Social History     Social History    Marital status: Single     Spouse name: N/A    Number of children: N/A    Years of education: N/A     Occupational History    Not on file.      Social History Main Topics    Smoking status: Current Every Day Smoker     Packs/day: 1.00 Types: Cigarettes    Smokeless tobacco: Never Used      Comment: 3-4 PPD    Alcohol use No    Drug use: Yes     Types:       Comment: crack every day     Sexual activity: Not on file     Other Topics Concern    Not on file     Social History Narrative    No narrative on file     Allergies:  Methadone; Morphine; Nexium [esomeprazole magnesium]; and Omeprazole    Review of Systems   Constitutional: Negative. Negative for activity change, appetite change, fatigue and fever. HENT: Negative. Negative for congestion, nosebleeds and tinnitus. Eyes: Negative. Negative for photophobia, discharge and visual disturbance. Respiratory: Negative. Negative for apnea, cough, chest tightness and shortness of breath. Cardiovascular: Negative. Negative for chest pain and palpitations. Gastrointestinal: Negative. Negative for abdominal pain, blood in stool, constipation, diarrhea, nausea and vomiting. Genitourinary: Negative. Negative for dysuria, frequency, hematuria and urgency. Musculoskeletal: Positive for myalgias. Negative for back pain and neck pain. Skin: Negative. Negative for pallor. Neurological: Negative. Negative for dizziness, syncope, speech difficulty, weakness, light-headedness and headaches. Psychiatric/Behavioral: Positive for sleep disturbance. Objective:   /70 (Site: Left Upper Arm, Position: Sitting, Cuff Size: Medium Adult)   Pulse 70   Temp 97.9 °F (36.6 °C)   Resp 14   Ht 5' 9\" (1.753 m)   Wt 200 lb (90.7 kg)   SpO2 97%   BMI 29.53 kg/m²     Physical Exam   Constitutional: He is oriented to person, place, and time. He appears well-developed and well-nourished. Blood pressure 100/70, pulse 70, temperature 97.9 °F (36.6 °C), resp. rate 14, height 5' 9\" (1.753 m), weight 200 lb (90.7 kg), SpO2 97 %. HENT:   Head: Normocephalic and atraumatic.    Right Ear: External ear normal.   Left Ear: External ear normal.   Eyes: Pupils are equal, round, and reactive

## 2018-10-19 LAB
ABSOLUTE CD 4 HELPER: 305 CELLS/UL (ref 430–1800)
CD4 % HELPER T CELL: 13 % (ref 32–64)
LYMPHOCYTE SUBSET PANEL 2 INFO: ABNORMAL

## 2018-10-20 LAB
HIV RNA PCR INTERPRETATION: DETECTED
HIV-1 RNA BY PCR, QN: 4.2 LOG
HIV-1 RNA BY PCR, QN: ABNORMAL CPY/ML

## 2018-10-23 ENCOUNTER — TELEPHONE (OUTPATIENT)
Dept: INFECTIOUS DISEASES | Age: 53
End: 2018-10-23

## 2018-10-23 LAB
C. TRACHOMATIS DNA ,URINE: NEGATIVE
EER HIV GENOTYPING: NORMAL
HIV-1 GENOTYPE: NORMAL
N. GONORRHOEAE DNA, URINE: NEGATIVE

## 2018-10-24 ENCOUNTER — OFFICE VISIT (OUTPATIENT)
Dept: INFECTIOUS DISEASES | Age: 53
End: 2018-10-24
Payer: MEDICARE

## 2018-10-24 ENCOUNTER — NURSE ONLY (OUTPATIENT)
Dept: INFECTIOUS DISEASES | Age: 53
End: 2018-10-24

## 2018-10-24 VITALS
HEART RATE: 70 BPM | TEMPERATURE: 98 F | BODY MASS INDEX: 29.77 KG/M2 | SYSTOLIC BLOOD PRESSURE: 106 MMHG | RESPIRATION RATE: 16 BRPM | WEIGHT: 201 LBS | HEIGHT: 69 IN | DIASTOLIC BLOOD PRESSURE: 80 MMHG

## 2018-10-24 DIAGNOSIS — L03.115 CELLULITIS OF RIGHT LOWER EXTREMITY: ICD-10-CM

## 2018-10-24 DIAGNOSIS — Z72.0 TOBACCO ABUSE: ICD-10-CM

## 2018-10-24 DIAGNOSIS — B20 HUMAN IMMUNODEFICIENCY VIRUS INFECTION (HCC): Primary | ICD-10-CM

## 2018-10-24 DIAGNOSIS — F33.2 SEVERE EPISODE OF RECURRENT MAJOR DEPRESSIVE DISORDER, WITHOUT PSYCHOTIC FEATURES (HCC): ICD-10-CM

## 2018-10-24 DIAGNOSIS — F14.90 CRACK COCAINE USE: ICD-10-CM

## 2018-10-24 DIAGNOSIS — C44.91 BASAL CELL CARCINOMA (BCC), UNSPECIFIED SITE: ICD-10-CM

## 2018-10-24 DIAGNOSIS — F32.A DEPRESSION, UNSPECIFIED DEPRESSION TYPE: ICD-10-CM

## 2018-10-24 PROCEDURE — 4004F PT TOBACCO SCREEN RCVD TLK: CPT | Performed by: INTERNAL MEDICINE

## 2018-10-24 PROCEDURE — G8484 FLU IMMUNIZE NO ADMIN: HCPCS | Performed by: INTERNAL MEDICINE

## 2018-10-24 PROCEDURE — 99215 OFFICE O/P EST HI 40 MIN: CPT | Performed by: INTERNAL MEDICINE

## 2018-10-24 PROCEDURE — G8417 CALC BMI ABV UP PARAM F/U: HCPCS | Performed by: INTERNAL MEDICINE

## 2018-10-24 PROCEDURE — 3017F COLORECTAL CA SCREEN DOC REV: CPT | Performed by: INTERNAL MEDICINE

## 2018-10-24 PROCEDURE — G8427 DOCREV CUR MEDS BY ELIG CLIN: HCPCS | Performed by: INTERNAL MEDICINE

## 2018-10-24 RX ORDER — SULFAMETHOXAZOLE AND TRIMETHOPRIM 800; 160 MG/1; MG/1
1 TABLET ORAL
COMMUNITY
End: 2019-04-24

## 2018-10-24 NOTE — PROGRESS NOTES
Initial HIV Consult      Patient Name: Abner Moser  YOB: 1965  Patient Sex: male  Medical Record Number: [id number]        Background:    Patient was diagnosed with HIV in 2000 in [place]. Had thrush at the time. HIs CD4 was noted to be 328 and genotype was negative for any resistance. Started on Combivir and Sustiva but quit taking meds a few years later. In 2007 was started on Atripla but stopped 3-4 years later. Then started taking Triumeq about 3 years ago but stopped taking that as well within a few years. Hx of crack use - $20 per day. Smokes 2-3 pacs of cigarettes at least per day. Has had over 100 sexual partners over his lifetime - males. Hx of multiple suicide attempts, including overdose of pills including lyrica, and still tells me that one day he intends to commit suicide when he is \"done with everything. \"  Does not like taking pills. States that he was seeing a psychiatrist but they put him on pills, which he did not take. And the pills only contributed to his depression. He has been institutionalized a few times for depression and suicide attempts. No STD history, he is edentulous, has a family hx of colon cancer and skin cancers. He had neck and lip squamous and basal cell cancers removed. Patient's father passed away from stage 4 renal cancer and his mother passed earlier in the year. His drug problem was out of control and he had stopped caring for himself. He moved away and recently returned October 2018. Father had stage 4 cancer renal cancer with mets and passed away. Mother passed away    Moved to [facility] temporarily after his father passed away and is back as of October 2018. Had a COPD exac on 10/8. Now has a new inhaler  Got a new PCP ( Dr Alka Chowdhury )  Is taking a new sleeping pill prescribed and monitored by his PCP   Saw Dermatology and biopsy obtained. Had L arm skin cancer - BCC. Surgery scheduled for November 28.    Saw Dr Dee Griffin for podiatry and surgery Impression/Plan  R heel cellulitis - very mild fluctuance of the area; no surrounding cellulitis. Following with Dr Jon Thornton. HIV infection - triumeq  Suicide attempt history -No current plan. Currently seems in good spirits but is on sleeping pills by his PCP. We discussed this for a long time. Defer management to PCP. PCP aware of his history. Crack use - cutting down. Basal Cell Carcinoma - sx pending with dermatology  Complains of trouble swallowing  Genotype reviewed - some NNRTI resistance. Hx of renal injury  Tobacco use  vitamin D deficiency hx. Check on this. Prophylaxis needed: Bactrim until his percent comes up       Prevention  Substance abuse screening performed:   Crack  Counseling provided during this visit > 50% yes  Brief Mental Health Screening performed:  Yes - suicide attempts  Physical abuse screening performed: yes - none    Patient has been informed of the importance of protection during sexual encounters which include, but are not limited to vaginal intercourse, anal intercourse, and oral sex. Patient has also been made aware that it is a felony in the [address] to engage in a sexual encounter without first disclosing HIV status to partner. Plans for next visit:  Continue to follow up with psych due to hx of depression/suicide attempts. Needs eye doc visit  Needs a colonoscopy due to family hx of colon cancer  Needs pain management    Follow up: 4 months. Patient is re-establishing care.    Time spent with patient: 60 min    [address]

## 2018-11-07 ENCOUNTER — TELEPHONE (OUTPATIENT)
Dept: INFECTIOUS DISEASES | Age: 53
End: 2018-11-07

## 2018-11-07 DIAGNOSIS — K21.9 GASTROESOPHAGEAL REFLUX DISEASE, ESOPHAGITIS PRESENCE NOT SPECIFIED: Primary | ICD-10-CM

## 2018-11-08 ENCOUNTER — TELEPHONE (OUTPATIENT)
Dept: INFECTIOUS DISEASES | Age: 53
End: 2018-11-08

## 2018-11-09 ENCOUNTER — TELEPHONE (OUTPATIENT)
Dept: INFECTIOUS DISEASES | Age: 53
End: 2018-11-09

## 2018-11-12 ENCOUNTER — TELEPHONE (OUTPATIENT)
Dept: INFECTIOUS DISEASES | Age: 53
End: 2018-11-12

## 2018-11-13 ENCOUNTER — TELEPHONE (OUTPATIENT)
Dept: INFECTIOUS DISEASES | Age: 53
End: 2018-11-13

## 2018-11-13 ENCOUNTER — OFFICE VISIT (OUTPATIENT)
Dept: PRIMARY CARE CLINIC | Age: 53
End: 2018-11-13
Payer: MEDICARE

## 2018-11-13 VITALS
WEIGHT: 200 LBS | HEART RATE: 55 BPM | DIASTOLIC BLOOD PRESSURE: 84 MMHG | HEIGHT: 69 IN | SYSTOLIC BLOOD PRESSURE: 116 MMHG | TEMPERATURE: 97.6 F | RESPIRATION RATE: 16 BRPM | OXYGEN SATURATION: 97 % | BODY MASS INDEX: 29.62 KG/M2

## 2018-11-13 DIAGNOSIS — F51.01 PRIMARY INSOMNIA: ICD-10-CM

## 2018-11-13 DIAGNOSIS — R06.6 HICCUPS: ICD-10-CM

## 2018-11-13 DIAGNOSIS — B20 HUMAN IMMUNODEFICIENCY VIRUS INFECTION (HCC): ICD-10-CM

## 2018-11-13 DIAGNOSIS — K21.9 GASTROESOPHAGEAL REFLUX DISEASE WITHOUT ESOPHAGITIS: Primary | ICD-10-CM

## 2018-11-13 DIAGNOSIS — F33.3 MDD (MAJOR DEPRESSIVE DISORDER), RECURRENT, SEVERE, WITH PSYCHOSIS (HCC): Chronic | ICD-10-CM

## 2018-11-13 PROCEDURE — G8417 CALC BMI ABV UP PARAM F/U: HCPCS | Performed by: FAMILY MEDICINE

## 2018-11-13 PROCEDURE — 99213 OFFICE O/P EST LOW 20 MIN: CPT | Performed by: FAMILY MEDICINE

## 2018-11-13 PROCEDURE — G8427 DOCREV CUR MEDS BY ELIG CLIN: HCPCS | Performed by: FAMILY MEDICINE

## 2018-11-13 PROCEDURE — 4004F PT TOBACCO SCREEN RCVD TLK: CPT | Performed by: FAMILY MEDICINE

## 2018-11-13 PROCEDURE — 3017F COLORECTAL CA SCREEN DOC REV: CPT | Performed by: FAMILY MEDICINE

## 2018-11-13 PROCEDURE — G8484 FLU IMMUNIZE NO ADMIN: HCPCS | Performed by: FAMILY MEDICINE

## 2018-11-13 RX ORDER — FLUCONAZOLE 100 MG/1
100 TABLET ORAL
COMMUNITY
End: 2018-11-13

## 2018-11-13 RX ORDER — ABACAVIR SULFATE, DOLUTEGRAVIR SODIUM, LAMIVUDINE 600; 50; 300 MG/1; MG/1; MG/1
TABLET, FILM COATED ORAL
Refills: 0 | COMMUNITY
Start: 2018-10-25 | End: 2021-06-10

## 2018-11-13 RX ORDER — DOXEPIN HYDROCHLORIDE 3 MG/1
3 TABLET ORAL
COMMUNITY
Start: 2018-10-18 | End: 2018-11-13

## 2018-11-13 RX ORDER — ZOLPIDEM TARTRATE 5 MG/1
5 TABLET ORAL NIGHTLY PRN
Qty: 30 TABLET | Refills: 0 | Status: SHIPPED | OUTPATIENT
Start: 2018-11-13 | End: 2018-11-27

## 2018-11-13 RX ORDER — PANTOPRAZOLE SODIUM 40 MG/1
40 TABLET, DELAYED RELEASE ORAL
COMMUNITY
Start: 2018-10-27 | End: 2018-12-14 | Stop reason: SDUPTHER

## 2018-11-13 ASSESSMENT — ENCOUNTER SYMPTOMS
DIARRHEA: 0
CHEST TIGHTNESS: 0
RESPIRATORY NEGATIVE: 1
BACK PAIN: 0
COUGH: 0
PHOTOPHOBIA: 0
APNEA: 0
CONSTIPATION: 0
BLOOD IN STOOL: 0
SHORTNESS OF BREATH: 0
ABDOMINAL PAIN: 0
EYES NEGATIVE: 1
EYE DISCHARGE: 0
VOMITING: 0
NAUSEA: 0

## 2018-11-13 NOTE — PROGRESS NOTES
Subjective:      Patient ID: Princess Kee is a 48 y.o. male who presents today for:  Chief Complaint   Patient presents with    Follow-Up from Hospital     Pt was seen at the [address] [place] [address] in [address] [facility]. Pt was seen for having the hiccups for 5days. Pt was given a shot of thorazine and acid reflex medication. Pt would like to know if he could have an oral thorazine. HPI   Hospital follow up  Pt was seen at [facility] for Hiccups. He had the hiccups for 5 days. He was given a shot of Thorazine and Started on Protonix 40mg. He is following up with Dr. Nichelle Ortega. He does still have intermittent hiccups. Insomnia  Pt presents today for insomnia. He was started on Doxepin. He states this medication did not work well for him and he would like to discuss alternatives. He states he does not feel he needs to nap during the day and does occasionally snore. Past Medical History:   Diagnosis Date    Anxiety     [facility] [address]     Cellulitis of heel, right 2016    after surgery x 3 on tendion     Depression     Erectile dysfunction     Headache(784.0)     HIV (human immunodeficiency virus infection) ([facility]     Liver disease     Osteoarthritis      Past Surgical History:   Procedure Laterality Date    CHOLECYSTECTOMY      FOOT SURGERY      bone extraction    FRACTURE SURGERY       Family History   Problem Relation Age of Onset    Family history unknown: Yes     Social History     Social History    Marital status: Single     Spouse name: N/A    Number of children: N/A    Years of education: N/A     Occupational History    Not on file.      Social History Main Topics    Smoking status: Current Every Day Smoker     Packs/day: 1.00     Types: Cigarettes    Smokeless tobacco: Never Used      Comment: 3-4 PPD    Alcohol use No    Drug use: Yes     Types:       Comment: crack every day     Sexual activity: Not on file     Other Topics Concern    Not on file     Social History Narrative    No narrative on file

## 2018-11-13 NOTE — TELEPHONE ENCOUNTER
Per request, CM scheduled transport for PAT 11/15/18 @ 1PM and appointment with Dr Dee Griffin 11/27/18 @ 9:45 AM.  CM assessed need, made arrangements as last resort.

## 2018-11-19 ASSESSMENT — ENCOUNTER SYMPTOMS: ABDOMINAL DISTENTION: 1

## 2018-11-21 ENCOUNTER — TELEPHONE (OUTPATIENT)
Dept: INFECTIOUS DISEASES | Age: 53
End: 2018-11-21

## 2018-11-27 ENCOUNTER — NURSE ONLY (OUTPATIENT)
Dept: INFECTIOUS DISEASES | Age: 53
End: 2018-11-27

## 2018-11-27 NOTE — PROGRESS NOTES
Pt presented for scheduled appointment with this CM. CM assisted pt in securing Medicare Part D for medication coverage. Pt chose [employer]. Pt to receive information in 7-10 business days. CM will then [facility] with [facility] premium and med co-pay assistance. CM encouraged pt to follow [facility]. Cm will follow up with pt as needed.

## 2018-11-30 ENCOUNTER — OFFICE VISIT (OUTPATIENT)
Dept: GASTROENTEROLOGY | Age: 53
End: 2018-11-30
Payer: MEDICARE

## 2018-11-30 VITALS
HEIGHT: 69 IN | TEMPERATURE: 98 F | BODY MASS INDEX: 29.77 KG/M2 | SYSTOLIC BLOOD PRESSURE: 132 MMHG | DIASTOLIC BLOOD PRESSURE: 86 MMHG | WEIGHT: 201 LBS

## 2018-11-30 DIAGNOSIS — K21.9 GASTROESOPHAGEAL REFLUX DISEASE, ESOPHAGITIS PRESENCE NOT SPECIFIED: ICD-10-CM

## 2018-11-30 DIAGNOSIS — R13.19 ESOPHAGEAL DYSPHAGIA: Primary | ICD-10-CM

## 2018-11-30 PROCEDURE — G8427 DOCREV CUR MEDS BY ELIG CLIN: HCPCS | Performed by: INTERNAL MEDICINE

## 2018-11-30 PROCEDURE — 4004F PT TOBACCO SCREEN RCVD TLK: CPT | Performed by: INTERNAL MEDICINE

## 2018-11-30 PROCEDURE — G8417 CALC BMI ABV UP PARAM F/U: HCPCS | Performed by: INTERNAL MEDICINE

## 2018-11-30 PROCEDURE — 99204 OFFICE O/P NEW MOD 45 MIN: CPT | Performed by: INTERNAL MEDICINE

## 2018-11-30 PROCEDURE — 3017F COLORECTAL CA SCREEN DOC REV: CPT | Performed by: INTERNAL MEDICINE

## 2018-11-30 PROCEDURE — G8484 FLU IMMUNIZE NO ADMIN: HCPCS | Performed by: INTERNAL MEDICINE

## 2018-11-30 RX ORDER — ZOLPIDEM TARTRATE 5 MG/1
5 TABLET ORAL NIGHTLY PRN
COMMUNITY
End: 2018-12-14 | Stop reason: SDUPTHER

## 2018-11-30 ASSESSMENT — ENCOUNTER SYMPTOMS
COUGH: 0
DIARRHEA: 0
SHORTNESS OF BREATH: 0
EYE PAIN: 0
VOMITING: 0
CONSTIPATION: 0
EYE DISCHARGE: 0
ABDOMINAL PAIN: 0
APNEA: 0
EYE ITCHING: 0
BACK PAIN: 0

## 2018-11-30 NOTE — PATIENT INSTRUCTIONS
Smoking can make GERD worse. If you need help quitting, talk to your doctor about stop-smoking programs and medicines. These can increase your chances of quitting for good. · If you have GERD symptoms at night, raise the head of your bed 6 to 8 inches by putting the frame on blocks or placing a foam wedge under the head of your mattress. (Adding extra pillows does not work.)  · Do not wear tight clothing around your middle. · Lose weight if you need to. Losing just 5 to 10 pounds can help. When should you call for help? Call your doctor now or seek immediate medical care if:    · You have new or different belly pain.     · Your stools are black and tarlike or have streaks of blood.    Watch closely for changes in your health, and be sure to contact your doctor if:    · Your symptoms have not improved after 2 days.     · Food seems to catch in your throat or chest.   Where can you learn more? Go to https://Nanoflex.FoxyTunes. org and sign in to your [employer] account. Enter P466 in the Germmatters box to learn more about \"Gastroesophageal Reflux Disease (GERD): Care Instructions. \"     If you do not have an account, please click on the \"Sign Up Now\" link. Current as of: March 28, 2018  Content Version: 11.8  © 5698-9689 Healthwise, Incorporated. Care instructions adapted under license by [facility] ([facility]). If you have questions about a medical condition or this instruction, always ask your healthcare professional. [place] [address] any warranty or liability for your use of this information.

## 2018-12-04 ENCOUNTER — TELEPHONE (OUTPATIENT)
Dept: INFECTIOUS DISEASES | Age: 53
End: 2018-12-04

## 2018-12-04 DIAGNOSIS — B20 HUMAN IMMUNODEFICIENCY VIRUS INFECTION (HCC): Primary | ICD-10-CM

## 2018-12-05 ENCOUNTER — NURSE ONLY (OUTPATIENT)
Dept: INFECTIOUS DISEASES | Age: 53
End: 2018-12-05

## 2018-12-05 DIAGNOSIS — B20 HUMAN IMMUNODEFICIENCY VIRUS (HCC): Primary | ICD-10-CM

## 2018-12-05 DIAGNOSIS — B20 HUMAN IMMUNODEFICIENCY VIRUS INFECTION (HCC): ICD-10-CM

## 2018-12-05 LAB
ALBUMIN SERPL-MCNC: 4.3 G/DL (ref 3.9–4.9)
ALP BLD-CCNC: 92 U/L (ref 35–104)
ALT SERPL-CCNC: 24 U/L (ref 0–41)
ANION GAP SERPL CALCULATED.3IONS-SCNC: 17 MEQ/L (ref 7–13)
AST SERPL-CCNC: 46 U/L (ref 0–40)
BILIRUB SERPL-MCNC: 0.7 MG/DL (ref 0–1.2)
BUN BLDV-MCNC: 14 MG/DL (ref 6–20)
CALCIUM SERPL-MCNC: 9.8 MG/DL (ref 8.6–10.2)
CHLORIDE BLD-SCNC: 95 MEQ/L (ref 98–107)
CO2: 25 MEQ/L (ref 22–29)
CREAT SERPL-MCNC: 1.24 MG/DL (ref 0.7–1.2)
GFR AFRICAN AMERICAN: >60
GFR NON-AFRICAN AMERICAN: >60
GLOBULIN: 4.6 G/DL (ref 2.3–3.5)
GLUCOSE BLD-MCNC: 56 MG/DL (ref 74–109)
HCT VFR BLD CALC: 51.8 % (ref 42–52)
HEMOGLOBIN: 17.5 G/DL (ref 14–18)
MCH RBC QN AUTO: 31.4 PG (ref 27–31.3)
MCHC RBC AUTO-ENTMCNC: 33.8 % (ref 33–37)
MCV RBC AUTO: 92.8 FL (ref 80–100)
PDW BLD-RTO: 16 % (ref 11.5–14.5)
PLATELET # BLD: 208 K/UL (ref 130–400)
PLATELET SLIDE REVIEW: NORMAL
POTASSIUM SERPL-SCNC: 4 MEQ/L (ref 3.5–5.1)
RBC # BLD: 5.58 M/UL (ref 4.7–6.1)
SODIUM BLD-SCNC: 137 MEQ/L (ref 132–144)
TOTAL PROTEIN: 8.9 G/DL (ref 6.4–8.1)
WBC # BLD: 7 K/UL (ref 4.8–10.8)

## 2018-12-05 NOTE — PROGRESS NOTES
Pt in for lab work. Cm met with pt, inquired about receipt of Medicare Part D information in order to complete OHDAP application for assistance with premiums and med co-pays. Pt has not yet received information. Pt is almost out of medications, RN notified, will explore resources for assistance. Pt provided income verification for 2019. CM will follow up with pt as needed.

## 2018-12-07 ENCOUNTER — TELEPHONE (OUTPATIENT)
Dept: INFECTIOUS DISEASES | Age: 53
End: 2018-12-07

## 2018-12-07 LAB
ABSOLUTE CD 4 HELPER: 346 CELLS/UL (ref 430–1800)
CD4 % HELPER T CELL: 17 % (ref 32–64)
LYMPHOCYTE SUBSET PANEL 2 INFO: ABNORMAL

## 2018-12-07 NOTE — TELEPHONE ENCOUNTER
Reached out to pt again. He will be out of medication soon. Needing signature for VIIV override medication. He stated he would be in today or Monday. Reinforced importance of this and compliance. He stated he understood.

## 2018-12-09 LAB
HIV-1 QNT LOG, IU/ML: NOT DETECTED LOG CPY/ML
HIV-1 QNT, IU/ML: NOT DETECTED CPY/ML
INTERPRETATION: NOT DETECTED

## 2018-12-12 ENCOUNTER — TELEPHONE (OUTPATIENT)
Dept: INFECTIOUS DISEASES | Age: 53
End: 2018-12-12

## 2018-12-12 NOTE — TELEPHONE ENCOUNTER
Pt will be able to come to support group. He will need help with transport. [facility] will be used as last resort.

## 2018-12-14 ENCOUNTER — OFFICE VISIT (OUTPATIENT)
Dept: PRIMARY CARE CLINIC | Age: 53
End: 2018-12-14
Payer: MEDICARE

## 2018-12-14 VITALS
RESPIRATION RATE: 16 BRPM | SYSTOLIC BLOOD PRESSURE: 118 MMHG | OXYGEN SATURATION: 95 % | HEIGHT: 69 IN | TEMPERATURE: 97.5 F | WEIGHT: 199 LBS | DIASTOLIC BLOOD PRESSURE: 80 MMHG | BODY MASS INDEX: 29.47 KG/M2 | HEART RATE: 72 BPM

## 2018-12-14 DIAGNOSIS — R06.6 HICCUPS: Primary | ICD-10-CM

## 2018-12-14 DIAGNOSIS — K21.9 GASTROESOPHAGEAL REFLUX DISEASE WITHOUT ESOPHAGITIS: ICD-10-CM

## 2018-12-14 DIAGNOSIS — F51.02 ADJUSTMENT INSOMNIA: ICD-10-CM

## 2018-12-14 PROCEDURE — 99213 OFFICE O/P EST LOW 20 MIN: CPT | Performed by: FAMILY MEDICINE

## 2018-12-14 PROCEDURE — G8484 FLU IMMUNIZE NO ADMIN: HCPCS | Performed by: FAMILY MEDICINE

## 2018-12-14 PROCEDURE — 3017F COLORECTAL CA SCREEN DOC REV: CPT | Performed by: FAMILY MEDICINE

## 2018-12-14 PROCEDURE — 4004F PT TOBACCO SCREEN RCVD TLK: CPT | Performed by: FAMILY MEDICINE

## 2018-12-14 PROCEDURE — G8427 DOCREV CUR MEDS BY ELIG CLIN: HCPCS | Performed by: FAMILY MEDICINE

## 2018-12-14 PROCEDURE — G8417 CALC BMI ABV UP PARAM F/U: HCPCS | Performed by: FAMILY MEDICINE

## 2018-12-14 RX ORDER — PANTOPRAZOLE SODIUM 40 MG/1
40 TABLET, DELAYED RELEASE ORAL DAILY
Qty: 30 TABLET | Refills: 2 | Status: SHIPPED | OUTPATIENT
Start: 2018-12-14 | End: 2019-07-26

## 2018-12-14 RX ORDER — BACLOFEN 10 MG/1
10 TABLET ORAL 3 TIMES DAILY
Qty: 90 TABLET | Refills: 1 | Status: SHIPPED | OUTPATIENT
Start: 2018-12-14 | End: 2019-01-13

## 2018-12-14 RX ORDER — ZOLPIDEM TARTRATE 5 MG/1
5 TABLET ORAL NIGHTLY PRN
Qty: 30 TABLET | Refills: 1 | Status: SHIPPED | OUTPATIENT
Start: 2018-12-14 | End: 2019-02-11 | Stop reason: SDUPTHER

## 2018-12-14 ASSESSMENT — ENCOUNTER SYMPTOMS
EYES NEGATIVE: 1
NAUSEA: 0
COUGH: 0
ABDOMINAL PAIN: 0
BACK PAIN: 0
CHEST TIGHTNESS: 0
APNEA: 0
VOMITING: 0
RESPIRATORY NEGATIVE: 1
BLOOD IN STOOL: 0
DIARRHEA: 0
PHOTOPHOBIA: 0
EYE DISCHARGE: 0
GASTROINTESTINAL NEGATIVE: 1
CONSTIPATION: 0
SHORTNESS OF BREATH: 0

## 2018-12-14 NOTE — PROGRESS NOTES
Subjective:      Patient ID: Caden Willard is a 48 y.o. male who presents today for:  Chief Complaint   Patient presents with    Hiccups     Pt states he has been having a reoccuring issue with the hiccups to the point when he is starting to vomit from them. HPI   Hiccups  This is a new problem. The problem started more than one month ago. The problem occurs intermittently. Associated symptoms include vomiting and shortness of breath. Treatment tried: Protonix with mild relief. Pt advised to eat smaller meals. Past Medical History:   Diagnosis Date    Anxiety     [facility] [address] [facility]     Cellulitis of heel, right 2016    after surgery x 3 on tendion     Depression     Erectile dysfunction     Headache(784.0)     HIV (human immunodeficiency virus infection) ([facility] [facility]     Liver disease     Osteoarthritis      Past Surgical History:   Procedure Laterality Date    CHOLECYSTECTOMY      FOOT SURGERY      bone extraction    FRACTURE SURGERY       Family History   Problem Relation Age of Onset    Family history unknown: Yes     Social History     Social History    Marital status: Single     Spouse name: N/A    Number of children: N/A    Years of education: N/A     Occupational History    Not on file. Social History Main Topics    Smoking status: Current Every Day Smoker     Packs/day: 1.00     Types: Cigarettes    Smokeless tobacco: Never Used      Comment: 3-4 PPD    Alcohol use No    Drug use: Yes     Types:       Comment: crack every day     Sexual activity: Not on file     Other Topics Concern    Not on file     Social History Narrative    No narrative on file     Allergies:  Methadone; Morphine; Nexium [esomeprazole magnesium]; and Omeprazole    Review of Systems   Constitutional: Negative. Negative for activity change, appetite change, fatigue and fever. HENT: Negative. Negative for congestion, nosebleeds and tinnitus. Eyes: Negative.   Negative for photophobia, discharge and

## 2018-12-19 ENCOUNTER — TELEPHONE (OUTPATIENT)
Dept: INFECTIOUS DISEASES | Age: 53
End: 2018-12-19

## 2018-12-21 NOTE — TELEPHONE ENCOUNTER
NO return call from pt or VIIV. Pt has not received meds? Call placed to [facility]- med shipped yesterday--- to arrive 12-27-18. Emergency override given due to pt has been out of meds for over 2 weeks. Spoke with Boogie Naqvi pharmacist.   ID # [id number]  BIN# [id number]  [facility]  Group: [id number]    Called [facility] per pt request. Explained to pt he must  with in 4 days or emergency code expires.

## 2018-12-26 ENCOUNTER — TELEPHONE (OUTPATIENT)
Dept: INFECTIOUS DISEASES | Age: 53
End: 2018-12-26

## 2018-12-27 ENCOUNTER — TELEPHONE (OUTPATIENT)
Dept: INFECTIOUS DISEASES | Age: 53
End: 2018-12-27

## 2018-12-27 NOTE — TELEPHONE ENCOUNTER
Pt called into clinic requesting med     Walk in to clinic. States he did miss a few weeks due to lapse in insurance and difficulty with proof of insurance and VIIV program.     90 day supply given to pt. He wanted meds shipped to office as confidentiality is protected. Denies other needs. He will call with problems with restarting medications. Dr Oli Pino updates on delay.

## 2019-01-02 ENCOUNTER — TELEPHONE (OUTPATIENT)
Dept: INFECTIOUS DISEASES | Age: 54
End: 2019-01-02

## 2019-01-14 ENCOUNTER — TELEPHONE (OUTPATIENT)
Dept: INFECTIOUS DISEASES | Age: 54
End: 2019-01-14

## 2019-01-16 ENCOUNTER — ANESTHESIA EVENT (OUTPATIENT)
Dept: ENDOSCOPY | Age: 54
End: 2019-01-16
Payer: MEDICARE

## 2019-01-16 ENCOUNTER — HOSPITAL ENCOUNTER (OUTPATIENT)
Age: 54
Setting detail: OUTPATIENT SURGERY
Discharge: HOME | End: 2019-01-16
Attending: INTERNAL MEDICINE | Admitting: INTERNAL MEDICINE
Payer: MEDICARE

## 2019-01-16 ENCOUNTER — ANESTHESIA (OUTPATIENT)
Dept: ENDOSCOPY | Age: 54
End: 2019-01-16
Payer: MEDICARE

## 2019-01-16 VITALS
SYSTOLIC BLOOD PRESSURE: 113 MMHG | TEMPERATURE: 98.1 F | HEIGHT: 69 IN | BODY MASS INDEX: 29.77 KG/M2 | OXYGEN SATURATION: 96 % | DIASTOLIC BLOOD PRESSURE: 82 MMHG | RESPIRATION RATE: 16 BRPM | WEIGHT: 201 LBS | HEART RATE: 67 BPM

## 2019-01-16 VITALS
SYSTOLIC BLOOD PRESSURE: 99 MMHG | DIASTOLIC BLOOD PRESSURE: 59 MMHG | RESPIRATION RATE: 11 BRPM | OXYGEN SATURATION: 99 %

## 2019-01-16 PROCEDURE — 6360000002 HC RX W HCPCS: Performed by: NURSE ANESTHETIST, CERTIFIED REGISTERED

## 2019-01-16 PROCEDURE — 2500000003 HC RX 250 WO HCPCS: Performed by: NURSE ANESTHETIST, CERTIFIED REGISTERED

## 2019-01-16 PROCEDURE — 88313 SPECIAL STAINS GROUP 2: CPT

## 2019-01-16 PROCEDURE — 7100000011 HC PHASE II RECOVERY - ADDTL 15 MIN: Performed by: INTERNAL MEDICINE

## 2019-01-16 PROCEDURE — 88305 TISSUE EXAM BY PATHOLOGIST: CPT

## 2019-01-16 PROCEDURE — 7100000010 HC PHASE II RECOVERY - FIRST 15 MIN: Performed by: INTERNAL MEDICINE

## 2019-01-16 PROCEDURE — 3609017100 HC EGD: Performed by: INTERNAL MEDICINE

## 2019-01-16 PROCEDURE — 3700000000 HC ANESTHESIA ATTENDED CARE: Performed by: INTERNAL MEDICINE

## 2019-01-16 PROCEDURE — 43239 EGD BIOPSY SINGLE/MULTIPLE: CPT | Performed by: INTERNAL MEDICINE

## 2019-01-16 PROCEDURE — 2580000003 HC RX 258: Performed by: INTERNAL MEDICINE

## 2019-01-16 PROCEDURE — 88342 IMHCHEM/IMCYTCHM 1ST ANTB: CPT

## 2019-01-16 PROCEDURE — 3700000001 HC ADD 15 MINUTES (ANESTHESIA): Performed by: INTERNAL MEDICINE

## 2019-01-16 RX ORDER — SODIUM CHLORIDE 0.9 % (FLUSH) 0.9 %
10 SYRINGE (ML) INJECTION PRN
Status: DISCONTINUED | OUTPATIENT
Start: 2019-01-16 | End: 2019-01-16 | Stop reason: HOSPADM

## 2019-01-16 RX ORDER — ONDANSETRON 2 MG/ML
4 INJECTION INTRAMUSCULAR; INTRAVENOUS
Status: DISCONTINUED | OUTPATIENT
Start: 2019-01-16 | End: 2019-01-16 | Stop reason: HOSPADM

## 2019-01-16 RX ORDER — PROPOFOL 10 MG/ML
INJECTION, EMULSION INTRAVENOUS PRN
Status: DISCONTINUED | OUTPATIENT
Start: 2019-01-16 | End: 2019-01-16 | Stop reason: SDUPTHER

## 2019-01-16 RX ORDER — GLYCOPYRROLATE 1 MG/5 ML
SYRINGE (ML) INTRAVENOUS PRN
Status: DISCONTINUED | OUTPATIENT
Start: 2019-01-16 | End: 2019-01-16 | Stop reason: SDUPTHER

## 2019-01-16 RX ORDER — LIDOCAINE HYDROCHLORIDE 10 MG/ML
1 INJECTION, SOLUTION EPIDURAL; INFILTRATION; INTRACAUDAL; PERINEURAL
Status: DISCONTINUED | OUTPATIENT
Start: 2019-01-16 | End: 2019-01-16 | Stop reason: HOSPADM

## 2019-01-16 RX ORDER — PROPOFOL 10 MG/ML
INJECTION, EMULSION INTRAVENOUS CONTINUOUS PRN
Status: DISCONTINUED | OUTPATIENT
Start: 2019-01-16 | End: 2019-01-16 | Stop reason: SDUPTHER

## 2019-01-16 RX ORDER — SODIUM CHLORIDE 0.9 % (FLUSH) 0.9 %
10 SYRINGE (ML) INJECTION EVERY 12 HOURS SCHEDULED
Status: DISCONTINUED | OUTPATIENT
Start: 2019-01-16 | End: 2019-01-16 | Stop reason: HOSPADM

## 2019-01-16 RX ORDER — SODIUM CHLORIDE 9 MG/ML
INJECTION, SOLUTION INTRAVENOUS CONTINUOUS
Status: DISCONTINUED | OUTPATIENT
Start: 2019-01-16 | End: 2019-01-16 | Stop reason: HOSPADM

## 2019-01-16 RX ORDER — LIDOCAINE HYDROCHLORIDE 10 MG/ML
INJECTION, SOLUTION INFILTRATION; PERINEURAL PRN
Status: DISCONTINUED | OUTPATIENT
Start: 2019-01-16 | End: 2019-01-16 | Stop reason: SDUPTHER

## 2019-01-16 RX ADMIN — Medication 0.2 MG: at 11:05

## 2019-01-16 RX ADMIN — PROPOFOL 140 MCG/KG/MIN: 10 INJECTION, EMULSION INTRAVENOUS at 11:05

## 2019-01-16 RX ADMIN — PROPOFOL 100 MG: 10 INJECTION, EMULSION INTRAVENOUS at 11:05

## 2019-01-16 RX ADMIN — SODIUM CHLORIDE: 9 INJECTION, SOLUTION INTRAVENOUS at 10:17

## 2019-01-16 RX ADMIN — LIDOCAINE HYDROCHLORIDE 50 MG: 10 INJECTION, SOLUTION INFILTRATION; PERINEURAL at 11:05

## 2019-01-16 ASSESSMENT — LIFESTYLE VARIABLES: SMOKING_STATUS: 1

## 2019-01-18 ENCOUNTER — OFFICE VISIT (OUTPATIENT)
Dept: PRIMARY CARE CLINIC | Age: 54
End: 2019-01-18
Payer: MEDICARE

## 2019-01-18 VITALS
RESPIRATION RATE: 16 BRPM | OXYGEN SATURATION: 95 % | TEMPERATURE: 98 F | HEART RATE: 79 BPM | HEIGHT: 69 IN | BODY MASS INDEX: 31.1 KG/M2 | DIASTOLIC BLOOD PRESSURE: 80 MMHG | SYSTOLIC BLOOD PRESSURE: 124 MMHG | WEIGHT: 210 LBS

## 2019-01-18 DIAGNOSIS — M15.9 PRIMARY OSTEOARTHRITIS INVOLVING MULTIPLE JOINTS: ICD-10-CM

## 2019-01-18 DIAGNOSIS — F33.2 MAJOR DEPRESSIVE DISORDER, RECURRENT SEVERE WITHOUT PSYCHOTIC FEATURES (HCC): ICD-10-CM

## 2019-01-18 DIAGNOSIS — B20 HUMAN IMMUNODEFICIENCY VIRUS INFECTION (HCC): ICD-10-CM

## 2019-01-18 DIAGNOSIS — L97.412 NEUROPATHIC ULCER OF RIGHT HEEL WITH FAT LAYER EXPOSED (HCC): Primary | ICD-10-CM

## 2019-01-18 PROCEDURE — 4004F PT TOBACCO SCREEN RCVD TLK: CPT | Performed by: FAMILY MEDICINE

## 2019-01-18 PROCEDURE — G8417 CALC BMI ABV UP PARAM F/U: HCPCS | Performed by: FAMILY MEDICINE

## 2019-01-18 PROCEDURE — 3017F COLORECTAL CA SCREEN DOC REV: CPT | Performed by: FAMILY MEDICINE

## 2019-01-18 PROCEDURE — G8427 DOCREV CUR MEDS BY ELIG CLIN: HCPCS | Performed by: FAMILY MEDICINE

## 2019-01-18 PROCEDURE — 99213 OFFICE O/P EST LOW 20 MIN: CPT | Performed by: FAMILY MEDICINE

## 2019-01-18 PROCEDURE — G8484 FLU IMMUNIZE NO ADMIN: HCPCS | Performed by: FAMILY MEDICINE

## 2019-01-18 RX ORDER — PREGABALIN 75 MG/1
75 CAPSULE ORAL 2 TIMES DAILY
Qty: 28 CAPSULE | Refills: 0 | Status: SHIPPED | OUTPATIENT
Start: 2019-01-18 | End: 2019-02-04 | Stop reason: SDUPTHER

## 2019-01-18 RX ORDER — TRAMADOL HYDROCHLORIDE 50 MG/1
50-100 TABLET ORAL EVERY 6 HOURS PRN
Qty: 25 TABLET | Refills: 0 | Status: SHIPPED | OUTPATIENT
Start: 2019-01-18 | End: 2019-02-04 | Stop reason: SDUPTHER

## 2019-01-18 RX ORDER — BACLOFEN 10 MG/1
10 TABLET ORAL
COMMUNITY
Start: 2018-12-14 | End: 2019-07-26 | Stop reason: ALTCHOICE

## 2019-01-18 ASSESSMENT — ENCOUNTER SYMPTOMS
VISUAL CHANGE: 0
CHANGE IN BOWEL HABIT: 0
NAUSEA: 0
SWOLLEN GLANDS: 0
COUGH: 0
SORE THROAT: 0
VOMITING: 0
ABDOMINAL PAIN: 0

## 2019-01-20 ASSESSMENT — ENCOUNTER SYMPTOMS
RESPIRATORY NEGATIVE: 1
BLOOD IN STOOL: 0
APNEA: 0
PHOTOPHOBIA: 0
CONSTIPATION: 0
EYE DISCHARGE: 0
GASTROINTESTINAL NEGATIVE: 1
EYES NEGATIVE: 1
CHEST TIGHTNESS: 0
DIARRHEA: 0
BACK PAIN: 0
SHORTNESS OF BREATH: 0

## 2019-01-25 ENCOUNTER — OFFICE VISIT (OUTPATIENT)
Dept: INFECTIOUS DISEASES | Age: 54
End: 2019-01-25
Payer: MEDICARE

## 2019-01-25 VITALS
TEMPERATURE: 97.4 F | RESPIRATION RATE: 22 BRPM | HEART RATE: 113 BPM | DIASTOLIC BLOOD PRESSURE: 87 MMHG | HEIGHT: 69 IN | BODY MASS INDEX: 30.36 KG/M2 | SYSTOLIC BLOOD PRESSURE: 133 MMHG | WEIGHT: 205 LBS

## 2019-01-25 DIAGNOSIS — E55.9 VITAMIN D DEFICIENCY: ICD-10-CM

## 2019-01-25 DIAGNOSIS — J01.10 ACUTE NON-RECURRENT FRONTAL SINUSITIS: ICD-10-CM

## 2019-01-25 DIAGNOSIS — K21.9 GASTROESOPHAGEAL REFLUX DISEASE, ESOPHAGITIS PRESENCE NOT SPECIFIED: ICD-10-CM

## 2019-01-25 DIAGNOSIS — F32.A DEPRESSION, UNSPECIFIED DEPRESSION TYPE: ICD-10-CM

## 2019-01-25 DIAGNOSIS — L03.115 CELLULITIS OF RIGHT FOOT: Primary | ICD-10-CM

## 2019-01-25 DIAGNOSIS — B20 HUMAN IMMUNODEFICIENCY VIRUS INFECTION (HCC): ICD-10-CM

## 2019-01-25 PROCEDURE — G8427 DOCREV CUR MEDS BY ELIG CLIN: HCPCS | Performed by: INTERNAL MEDICINE

## 2019-01-25 PROCEDURE — G8484 FLU IMMUNIZE NO ADMIN: HCPCS | Performed by: INTERNAL MEDICINE

## 2019-01-25 PROCEDURE — 4004F PT TOBACCO SCREEN RCVD TLK: CPT | Performed by: INTERNAL MEDICINE

## 2019-01-25 PROCEDURE — G8417 CALC BMI ABV UP PARAM F/U: HCPCS | Performed by: INTERNAL MEDICINE

## 2019-01-25 PROCEDURE — 3017F COLORECTAL CA SCREEN DOC REV: CPT | Performed by: INTERNAL MEDICINE

## 2019-01-25 PROCEDURE — 99215 OFFICE O/P EST HI 40 MIN: CPT | Performed by: INTERNAL MEDICINE

## 2019-01-25 RX ORDER — GUAIFENESIN 600 MG/1
600 TABLET, EXTENDED RELEASE ORAL 2 TIMES DAILY
Qty: 30 TABLET | Refills: 0 | Status: SHIPPED | OUTPATIENT
Start: 2019-01-25 | End: 2019-02-09

## 2019-01-25 RX ORDER — BENZONATATE 100 MG/1
100 CAPSULE ORAL 2 TIMES DAILY PRN
Qty: 20 CAPSULE | Refills: 0 | Status: SHIPPED | OUTPATIENT
Start: 2019-01-25 | End: 2019-02-01

## 2019-01-25 RX ORDER — DOXYCYCLINE HYCLATE 100 MG
100 TABLET ORAL 2 TIMES DAILY
Qty: 14 TABLET | Refills: 0 | Status: SHIPPED | OUTPATIENT
Start: 2019-01-25 | End: 2019-02-01

## 2019-01-28 ENCOUNTER — TELEPHONE (OUTPATIENT)
Dept: INFECTIOUS DISEASES | Age: 54
End: 2019-01-28

## 2019-01-29 ENCOUNTER — TELEPHONE (OUTPATIENT)
Dept: INFECTIOUS DISEASES | Age: 54
End: 2019-01-29

## 2019-02-04 DIAGNOSIS — M15.9 PRIMARY OSTEOARTHRITIS INVOLVING MULTIPLE JOINTS: ICD-10-CM

## 2019-02-04 RX ORDER — TRAMADOL HYDROCHLORIDE 50 MG/1
50-100 TABLET ORAL EVERY 6 HOURS PRN
Qty: 25 TABLET | Refills: 0 | Status: SHIPPED | OUTPATIENT
Start: 2019-02-04 | End: 2019-02-20 | Stop reason: SDUPTHER

## 2019-02-04 RX ORDER — PREGABALIN 75 MG/1
75 CAPSULE ORAL 2 TIMES DAILY
Qty: 28 CAPSULE | Refills: 0 | Status: SHIPPED | OUTPATIENT
Start: 2019-02-04 | End: 2019-02-20 | Stop reason: SDUPTHER

## 2019-02-05 ENCOUNTER — HOSPITAL ENCOUNTER (EMERGENCY)
Age: 54
Discharge: HOME | End: 2019-02-05
Payer: MEDICARE

## 2019-02-05 VITALS
HEIGHT: 69 IN | HEART RATE: 71 BPM | BODY MASS INDEX: 29.62 KG/M2 | OXYGEN SATURATION: 95 % | DIASTOLIC BLOOD PRESSURE: 81 MMHG | RESPIRATION RATE: 18 BRPM | SYSTOLIC BLOOD PRESSURE: 120 MMHG | WEIGHT: 200 LBS | TEMPERATURE: 97.7 F

## 2019-02-05 DIAGNOSIS — H66.001 ACUTE SUPPURATIVE OTITIS MEDIA OF RIGHT EAR WITHOUT SPONTANEOUS RUPTURE OF TYMPANIC MEMBRANE, RECURRENCE NOT SPECIFIED: Primary | ICD-10-CM

## 2019-02-05 PROCEDURE — 99282 EMERGENCY DEPT VISIT SF MDM: CPT

## 2019-02-05 RX ORDER — LORATADINE AND PSEUDOEPHEDRINE 10; 240 MG/1; MG/1
1 TABLET, EXTENDED RELEASE ORAL DAILY
Qty: 10 TABLET | Refills: 0 | Status: SHIPPED | OUTPATIENT
Start: 2019-02-05 | End: 2019-07-26

## 2019-02-05 RX ORDER — AMOXICILLIN AND CLAVULANATE POTASSIUM 875; 125 MG/1; MG/1
1 TABLET, FILM COATED ORAL 2 TIMES DAILY
Qty: 20 TABLET | Refills: 0 | Status: SHIPPED | OUTPATIENT
Start: 2019-02-05 | End: 2019-02-15

## 2019-02-05 ASSESSMENT — PAIN SCALES - GENERAL: PAINLEVEL_OUTOF10: 9

## 2019-02-05 ASSESSMENT — ENCOUNTER SYMPTOMS
RHINORRHEA: 1
EYE PAIN: 0
BACK PAIN: 0
SHORTNESS OF BREATH: 0
COUGH: 0
ABDOMINAL PAIN: 0
NAUSEA: 0
VOMITING: 0
PHOTOPHOBIA: 0
SORE THROAT: 0
DIARRHEA: 0

## 2019-02-05 ASSESSMENT — PAIN DESCRIPTION - LOCATION: LOCATION: EAR

## 2019-02-05 ASSESSMENT — PAIN DESCRIPTION - ORIENTATION: ORIENTATION: RIGHT

## 2019-02-08 ENCOUNTER — TELEPHONE (OUTPATIENT)
Dept: INFECTIOUS DISEASES | Age: 54
End: 2019-02-08

## 2019-02-11 ENCOUNTER — TELEPHONE (OUTPATIENT)
Dept: INFECTIOUS DISEASES | Age: 54
End: 2019-02-11

## 2019-02-11 DIAGNOSIS — F51.02 ADJUSTMENT INSOMNIA: ICD-10-CM

## 2019-02-12 ENCOUNTER — TELEPHONE (OUTPATIENT)
Dept: INFECTIOUS DISEASES | Age: 54
End: 2019-02-12

## 2019-02-12 RX ORDER — ZOLPIDEM TARTRATE 5 MG/1
5 TABLET ORAL NIGHTLY PRN
Qty: 30 TABLET | Refills: 1 | Status: SHIPPED | OUTPATIENT
Start: 2019-02-12 | End: 2019-03-14

## 2019-02-15 ENCOUNTER — TELEPHONE (OUTPATIENT)
Dept: INFECTIOUS DISEASES | Age: 54
End: 2019-02-15

## 2019-02-18 ENCOUNTER — OFFICE VISIT (OUTPATIENT)
Dept: GASTROENTEROLOGY | Age: 54
End: 2019-02-18
Payer: MEDICARE

## 2019-02-18 VITALS
SYSTOLIC BLOOD PRESSURE: 110 MMHG | HEIGHT: 69 IN | DIASTOLIC BLOOD PRESSURE: 90 MMHG | BODY MASS INDEX: 30.54 KG/M2 | WEIGHT: 206.2 LBS | OXYGEN SATURATION: 97 % | HEART RATE: 76 BPM | TEMPERATURE: 97.7 F

## 2019-02-18 DIAGNOSIS — R13.12 OROPHARYNGEAL DYSPHAGIA: Primary | ICD-10-CM

## 2019-02-18 PROCEDURE — 4004F PT TOBACCO SCREEN RCVD TLK: CPT | Performed by: INTERNAL MEDICINE

## 2019-02-18 PROCEDURE — 3017F COLORECTAL CA SCREEN DOC REV: CPT | Performed by: INTERNAL MEDICINE

## 2019-02-18 PROCEDURE — G8427 DOCREV CUR MEDS BY ELIG CLIN: HCPCS | Performed by: INTERNAL MEDICINE

## 2019-02-18 PROCEDURE — 99213 OFFICE O/P EST LOW 20 MIN: CPT | Performed by: INTERNAL MEDICINE

## 2019-02-18 PROCEDURE — G8484 FLU IMMUNIZE NO ADMIN: HCPCS | Performed by: INTERNAL MEDICINE

## 2019-02-18 PROCEDURE — G8417 CALC BMI ABV UP PARAM F/U: HCPCS | Performed by: INTERNAL MEDICINE

## 2019-02-18 RX ORDER — AMOXICILLIN 875 MG/1
875 TABLET, COATED ORAL 2 TIMES DAILY
COMMUNITY
End: 2019-02-20 | Stop reason: ALTCHOICE

## 2019-02-18 ASSESSMENT — ENCOUNTER SYMPTOMS
EYE ITCHING: 0
EYE DISCHARGE: 0
BACK PAIN: 0
VOMITING: 0
COUGH: 0
EYE PAIN: 0
SHORTNESS OF BREATH: 0
CONSTIPATION: 0
ABDOMINAL PAIN: 0
DIARRHEA: 0
APNEA: 0

## 2019-02-20 ENCOUNTER — TELEPHONE (OUTPATIENT)
Dept: INFECTIOUS DISEASES | Age: 54
End: 2019-02-20

## 2019-02-20 ENCOUNTER — OFFICE VISIT (OUTPATIENT)
Dept: PRIMARY CARE CLINIC | Age: 54
End: 2019-02-20
Payer: MEDICARE

## 2019-02-20 VITALS
BODY MASS INDEX: 30.36 KG/M2 | OXYGEN SATURATION: 97 % | SYSTOLIC BLOOD PRESSURE: 110 MMHG | HEIGHT: 69 IN | WEIGHT: 205 LBS | TEMPERATURE: 97.8 F | RESPIRATION RATE: 14 BRPM | HEART RATE: 78 BPM | DIASTOLIC BLOOD PRESSURE: 68 MMHG

## 2019-02-20 DIAGNOSIS — G89.4 CHRONIC PAIN DISORDER: ICD-10-CM

## 2019-02-20 DIAGNOSIS — K27.9 PUD (PEPTIC ULCER DISEASE): ICD-10-CM

## 2019-02-20 DIAGNOSIS — K21.9 GASTROESOPHAGEAL REFLUX DISEASE, ESOPHAGITIS PRESENCE NOT SPECIFIED: Primary | ICD-10-CM

## 2019-02-20 DIAGNOSIS — M15.9 PRIMARY OSTEOARTHRITIS INVOLVING MULTIPLE JOINTS: ICD-10-CM

## 2019-02-20 PROCEDURE — 3017F COLORECTAL CA SCREEN DOC REV: CPT | Performed by: FAMILY MEDICINE

## 2019-02-20 PROCEDURE — G8417 CALC BMI ABV UP PARAM F/U: HCPCS | Performed by: FAMILY MEDICINE

## 2019-02-20 PROCEDURE — G8427 DOCREV CUR MEDS BY ELIG CLIN: HCPCS | Performed by: FAMILY MEDICINE

## 2019-02-20 PROCEDURE — G8484 FLU IMMUNIZE NO ADMIN: HCPCS | Performed by: FAMILY MEDICINE

## 2019-02-20 PROCEDURE — 4004F PT TOBACCO SCREEN RCVD TLK: CPT | Performed by: FAMILY MEDICINE

## 2019-02-20 PROCEDURE — 99213 OFFICE O/P EST LOW 20 MIN: CPT | Performed by: FAMILY MEDICINE

## 2019-02-20 RX ORDER — TRAMADOL HYDROCHLORIDE 50 MG/1
50-100 TABLET ORAL EVERY 6 HOURS PRN
Qty: 60 TABLET | Refills: 2 | Status: SHIPPED | OUTPATIENT
Start: 2019-02-20 | End: 2019-03-22

## 2019-02-20 RX ORDER — PREGABALIN 75 MG/1
75 CAPSULE ORAL 2 TIMES DAILY
Qty: 60 CAPSULE | Refills: 2 | Status: SHIPPED | OUTPATIENT
Start: 2019-02-20 | End: 2021-04-30 | Stop reason: ALTCHOICE

## 2019-02-20 ASSESSMENT — ENCOUNTER SYMPTOMS
EYE DISCHARGE: 0
CHEST TIGHTNESS: 0
SHORTNESS OF BREATH: 0
COUGH: 0
ABDOMINAL PAIN: 0
VOMITING: 0
EYES NEGATIVE: 1
GASTROINTESTINAL NEGATIVE: 1
CONSTIPATION: 0
BLOOD IN STOOL: 0
PHOTOPHOBIA: 0
NAUSEA: 0
BACK PAIN: 0
APNEA: 0
RESPIRATORY NEGATIVE: 1
DIARRHEA: 0

## 2019-02-25 ENCOUNTER — HOSPITAL ENCOUNTER (OUTPATIENT)
Dept: GENERAL RADIOLOGY | Age: 54
Discharge: HOME | End: 2019-02-27
Payer: MEDICARE

## 2019-02-25 DIAGNOSIS — R13.12 OROPHARYNGEAL DYSPHAGIA: ICD-10-CM

## 2019-02-25 PROCEDURE — 2500000003 HC RX 250 WO HCPCS: Performed by: INTERNAL MEDICINE

## 2019-02-25 PROCEDURE — 74230 X-RAY XM SWLNG FUNCJ C+: CPT

## 2019-02-25 RX ADMIN — BARIUM SULFATE 50 G: 0.81 POWDER, FOR SUSPENSION ORAL at 13:19

## 2019-02-25 RX ADMIN — BARIUM SULFATE 80 ML: 400 SUSPENSION ORAL at 13:18

## 2019-02-28 ENCOUNTER — TELEPHONE (OUTPATIENT)
Dept: PRIMARY CARE CLINIC | Age: 54
End: 2019-02-28

## 2019-02-28 ENCOUNTER — TELEPHONE (OUTPATIENT)
Dept: INFECTIOUS DISEASES | Age: 54
End: 2019-02-28

## 2019-03-04 ENCOUNTER — HOSPITAL ENCOUNTER (OUTPATIENT)
Dept: SPEECH THERAPY | Age: 54
Setting detail: THERAPIES SERIES
Discharge: HOME | End: 2019-03-04
Payer: MEDICARE

## 2019-03-04 PROCEDURE — 92610 EVALUATE SWALLOWING FUNCTION: CPT

## 2019-03-04 PROCEDURE — G8998 SWALLOW D/C STATUS: HCPCS

## 2019-03-04 PROCEDURE — G8997 SWALLOW GOAL STATUS: HCPCS

## 2019-03-04 PROCEDURE — G8996 SWALLOW CURRENT STATUS: HCPCS

## 2019-03-07 ENCOUNTER — NURSE ONLY (OUTPATIENT)
Dept: INFECTIOUS DISEASES | Age: 54
End: 2019-03-07

## 2019-03-09 DIAGNOSIS — F51.02 ADJUSTMENT INSOMNIA: ICD-10-CM

## 2019-03-09 RX ORDER — ZOLPIDEM TARTRATE 5 MG/1
5 TABLET ORAL NIGHTLY PRN
Qty: 30 TABLET | Refills: 1 | OUTPATIENT
Start: 2019-03-09 | End: 2019-04-08

## 2019-03-14 NOTE — TELEPHONE ENCOUNTER
Sofia Gordon PT. Initial request came in last Friday. Pt is calling to follow up on this. Last OV was 02/20/2019. It was denied, maybe because it was routed to the wrong person?

## 2019-03-20 ENCOUNTER — TELEPHONE (OUTPATIENT)
Dept: INFECTIOUS DISEASES | Age: 54
End: 2019-03-20

## 2019-03-25 RX ORDER — ZOLPIDEM TARTRATE 5 MG/1
10 TABLET ORAL
COMMUNITY
End: 2019-12-09 | Stop reason: ALTCHOICE

## 2019-03-25 RX ORDER — DOXYCYCLINE 100 MG/1
100 CAPSULE ORAL
COMMUNITY
Start: 2018-10-10 | End: 2019-04-24

## 2019-03-26 ENCOUNTER — TELEPHONE (OUTPATIENT)
Dept: INFECTIOUS DISEASES | Age: 54
End: 2019-03-26

## 2019-03-26 NOTE — TELEPHONE ENCOUNTER
Pt called in stating he was having some problems with [facility]. [facility]- stated no record of pt call. Pt is to call and re register. Called in triumeq ok per Dr Alonso Singh. Other MD will have to transfer scritps to [facility] if covered. Discussed formulary.

## 2019-03-29 ENCOUNTER — TELEPHONE (OUTPATIENT)
Dept: INFECTIOUS DISEASES | Age: 54
End: 2019-03-29

## 2019-04-08 ENCOUNTER — TELEPHONE (OUTPATIENT)
Dept: INFECTIOUS DISEASES | Age: 54
End: 2019-04-08

## 2019-04-08 NOTE — TELEPHONE ENCOUNTER
Per request, CM will cancel transport to lab appointment 4/17/19 and reschedule for 4/24/19 @ 10:45AM.  CM assessed need, made arrangements as last resort.

## 2019-04-09 ENCOUNTER — TELEPHONE (OUTPATIENT)
Dept: INFECTIOUS DISEASES | Age: 54
End: 2019-04-09

## 2019-04-09 NOTE — TELEPHONE ENCOUNTER
Request, CM made adjustments to transport schedule. Pt will need transport 4/17/19, P/U @ 10:45AM for lab work and 4/24/19 P/U @ 10:30AM for appointment with HD RN. CM assessed need need, made arrangements as last resort.

## 2019-04-15 DIAGNOSIS — B20 HUMAN IMMUNODEFICIENCY VIRUS INFECTION (HCC): Primary | ICD-10-CM

## 2019-04-15 DIAGNOSIS — E78.9 LIPID DISORDER: ICD-10-CM

## 2019-04-17 ENCOUNTER — NURSE ONLY (OUTPATIENT)
Dept: INFECTIOUS DISEASES | Age: 54
End: 2019-04-17

## 2019-04-17 DIAGNOSIS — B20 HUMAN IMMUNODEFICIENCY VIRUS INFECTION (HCC): Primary | ICD-10-CM

## 2019-04-17 DIAGNOSIS — B20 HUMAN IMMUNODEFICIENCY VIRUS (HCC): Primary | ICD-10-CM

## 2019-04-17 DIAGNOSIS — E78.9 LIPID DISORDER: ICD-10-CM

## 2019-04-17 DIAGNOSIS — E55.9 VITAMIN D DEFICIENCY: ICD-10-CM

## 2019-04-17 DIAGNOSIS — B20 HUMAN IMMUNODEFICIENCY VIRUS INFECTION (HCC): ICD-10-CM

## 2019-04-17 PROBLEM — M60.20 FOREIGN BODY REACTION: Status: ACTIVE | Noted: 2018-11-12

## 2019-04-17 PROBLEM — M67.431 GANGLION CYST OF VOLAR ASPECT OF RIGHT WRIST: Status: ACTIVE | Noted: 2018-10-30

## 2019-04-17 PROBLEM — J44.9 COPD (CHRONIC OBSTRUCTIVE PULMONARY DISEASE) (HCC): Status: ACTIVE | Noted: 2019-04-17

## 2019-04-17 LAB
ALBUMIN SERPL-MCNC: 4.1 G/DL (ref 3.5–4.6)
ALP BLD-CCNC: 110 U/L (ref 35–104)
ALT SERPL-CCNC: 16 U/L (ref 0–41)
ANION GAP SERPL CALCULATED.3IONS-SCNC: 16 MEQ/L (ref 9–15)
AST SERPL-CCNC: 16 U/L (ref 0–40)
BILIRUB SERPL-MCNC: 0.3 MG/DL (ref 0.2–0.7)
BUN BLDV-MCNC: 12 MG/DL (ref 6–20)
CALCIUM SERPL-MCNC: 9.2 MG/DL (ref 8.5–9.9)
CHLORIDE BLD-SCNC: 102 MEQ/L (ref 95–107)
CHOLESTEROL, TOTAL: 187 MG/DL (ref 0–199)
CO2: 23 MEQ/L (ref 20–31)
CREAT SERPL-MCNC: 0.91 MG/DL (ref 0.7–1.2)
FOLATE: 3.7 NG/ML (ref 7.3–26.1)
GFR AFRICAN AMERICAN: >60
GFR NON-AFRICAN AMERICAN: >60
GLOBULIN: 3.4 G/DL (ref 2.3–3.5)
GLUCOSE BLD-MCNC: 48 MG/DL (ref 70–99)
HCT VFR BLD CALC: 51.4 % (ref 42–52)
HDLC SERPL-MCNC: 44 MG/DL (ref 40–59)
HEMOGLOBIN: 17 G/DL (ref 14–18)
HEPATITIS C ANTIBODY INTERPRETATION: NORMAL
LDL CHOLESTEROL CALCULATED: 119 MG/DL (ref 0–129)
MCH RBC QN AUTO: 30.9 PG (ref 27–31.3)
MCHC RBC AUTO-ENTMCNC: 33 % (ref 33–37)
MCV RBC AUTO: 93.5 FL (ref 80–100)
PDW BLD-RTO: 15.6 % (ref 11.5–14.5)
PLATELET # BLD: 177 K/UL (ref 130–400)
POTASSIUM SERPL-SCNC: 3.9 MEQ/L (ref 3.4–4.9)
RBC # BLD: 5.49 M/UL (ref 4.7–6.1)
SODIUM BLD-SCNC: 141 MEQ/L (ref 135–144)
TOTAL PROTEIN: 7.5 G/DL (ref 6.3–8)
TRIGL SERPL-MCNC: 120 MG/DL (ref 0–150)
VITAMIN B-12: <150 PG/ML (ref 232–1245)
VITAMIN D 25-HYDROXY: 12.7 NG/ML (ref 30–100)
WBC # BLD: 5.9 K/UL (ref 4.8–10.8)

## 2019-04-17 NOTE — PROGRESS NOTES
RN Intake      Patient ID:   Hartwell Nissen  Date:   4/17/2019      Client ID:      [id number]  (home)   [x] OK to leave voicemail     Family/House:    [] Partner  [] Children  [x] Other - multiple family members  Housing:   home    Transport:  public transportation  He does not have car. Employment:  [employer]    Mental Health:   Refuses any type of referrals for mental health and/or drug rehab at this time. States he will see PCP to help treat anxiety. He is wanting ativan. (he has OD on this prior.) denies s/i ideations.      Substance Abuse:     Social History     Socioeconomic History    Marital status: Single     Spouse name: Not on file    Number of children: Not on file    Years of education: Not on file    Highest education level: Not on file   Occupational History    Not on file   Social Needs    Financial resource strain: Not on file    Food insecurity:     Worry: Not on file     Inability: Not on file    Transportation needs:     Medical: Not on file     Non-medical: Not on file   Tobacco Use    Smoking status: Current Every Day Smoker     Packs/day: 2.00     Years: 44.00     Pack years: 88.00     Types: Cigarettes    Smokeless tobacco: Never Used    Tobacco comment: 3-4 PPD   Substance and Sexual Activity    Alcohol use: No     Alcohol/week: 0.0 oz    Drug use: Yes     Types: Cocaine, Marijuana     Comment: crack     Sexual activity: Not on file   Lifestyle    Physical activity:     Days per week: Not on file     Minutes per session: Not on file    Stress: Not on file   Relationships    Social connections:     Talks on phone: Not on file     Gets together: Not on file     Attends Ep[employer] service: Not on file     Active member of club or organization: Not on file     Attends meetings of clubs or organizations: Not on file     Relationship status: Not on file    Intimate partner violence:     Fear of current or ex partner: Not on file pregabalin (LYRICA) 75 MG capsule Take 1 capsule by mouth 2 times daily for 30 days. . 60 capsule 2    Pantoprazole Sodium (PROTONIX PO) Take by mouth      loratadine-pseudoephedrine (CLARITIN-D 24HR)  MG per extended release tablet Take 1 tablet by mouth daily 10 tablet 0    baclofen (LIORESAL) 10 MG tablet Take 10 mg by mouth      pantoprazole (PROTONIX) 40 MG tablet Take 1 tablet by mouth daily 30 tablet 2    TRIUMEQ 600- MG TABS   0    sulfamethoxazole-trimethoprim (BACTRIM DS;SEPTRA DS) 800-160 MG per tablet Take 1 tablet by mouth three times a week      albuterol sulfate HFA (PROAIR HFA) 108 (90 Base) MCG/ACT inhaler Use every 4 hours while awake for 7-10 days then PRN wheezing  Dispense with SPACER and Instruct on use. May sub Ventolin or Proventil as needed per [facility]. 1 Inhaler 1     No current facility-administered medications on file prior to visit. confirmed no changes with pt    History of HIV Medications:  triumeq currently. Tolerates well. Misses on occision. Reinforced importance of adherece. [facility] [address] [address] [facility], [address]  [place] [id number]  Phone: [phone] Fax: [phone]  no changes    Legal Issues:    na    Emergency Contact:   Extended Emergency Contact Information  Primary Emergency Contact: Tariq Adame [address] Phone: [phone]  Work Phone: [phone]  Mobile Phone: [phone]  Relation: Brother/Sister  No changes  Support System:  Family and friends. He does go to support group. RN Comments:  Doing ok. Discussed drug rehab a lot and mental health. He refuses any type of referral for either. States he will discuss with PCP.    He denies any other needs and will call with updates

## 2019-04-18 LAB — RPR: NORMAL

## 2019-04-19 ENCOUNTER — TELEPHONE (OUTPATIENT)
Dept: INFECTIOUS DISEASES | Age: 54
End: 2019-04-19

## 2019-04-19 LAB
ABSOLUTE CD 4 HELPER: 345 CELLS/UL (ref 430–1800)
CD4 % HELPER T CELL: 19 % (ref 32–64)
LYMPHOCYTE SUBSET PANEL 2 INFO: ABNORMAL
QUANTI TB GOLD PLUS: NEGATIVE
QUANTI TB1 MINUS NIL: 0 IU/ML (ref 0–0.34)
QUANTI TB2 MINUS NIL: 0 IU/ML (ref 0–0.34)
QUANTIFERON MITOGEN: >10 IU/ML
QUANTIFERON NIL: 0.06 IU/ML

## 2019-04-20 LAB
SEX HORMONE BINDING GLOBULIN: 46 NMOL/L (ref 11–80)
TESTOSTERONE FREE PERCENT: 1.6 % (ref 1.6–2.9)
TESTOSTERONE FREE, CALC: 116 PG/ML (ref 47–244)
TESTOSTERONE TOTAL-MALE: 704 NG/DL (ref 300–890)

## 2019-04-21 LAB
HIV-1 QNT LOG, IU/ML: <1.47 LOG CPY/ML
HIV-1 QNT, IU/ML: ABNORMAL CPY/ML
INTERPRETATION: DETECTED

## 2019-04-24 ENCOUNTER — OFFICE VISIT (OUTPATIENT)
Dept: INFECTIOUS DISEASES | Age: 54
End: 2019-04-24
Payer: MEDICARE

## 2019-04-24 ENCOUNTER — NURSE ONLY (OUTPATIENT)
Dept: INFECTIOUS DISEASES | Age: 54
End: 2019-04-24

## 2019-04-24 VITALS
HEART RATE: 70 BPM | WEIGHT: 211 LBS | DIASTOLIC BLOOD PRESSURE: 97 MMHG | RESPIRATION RATE: 18 BRPM | TEMPERATURE: 98 F | BODY MASS INDEX: 31.25 KG/M2 | SYSTOLIC BLOOD PRESSURE: 141 MMHG | HEIGHT: 69 IN

## 2019-04-24 DIAGNOSIS — R13.10 DYSPHAGIA, UNSPECIFIED TYPE: ICD-10-CM

## 2019-04-24 DIAGNOSIS — B20 HIV (HUMAN IMMUNODEFICIENCY VIRUS INFECTION) (HCC): ICD-10-CM

## 2019-04-24 DIAGNOSIS — L03.119 CELLULITIS OF FOOT: ICD-10-CM

## 2019-04-24 DIAGNOSIS — Z00.00 PREVENTATIVE HEALTH CARE: ICD-10-CM

## 2019-04-24 DIAGNOSIS — R53.83 FATIGUE, UNSPECIFIED TYPE: ICD-10-CM

## 2019-04-24 DIAGNOSIS — R74.8 ELEVATED LIVER ENZYMES: ICD-10-CM

## 2019-04-24 DIAGNOSIS — Z11.59 NEED FOR HEPATITIS B SCREENING TEST: ICD-10-CM

## 2019-04-24 DIAGNOSIS — B20 HIV (HUMAN IMMUNODEFICIENCY VIRUS INFECTION) (HCC): Primary | ICD-10-CM

## 2019-04-24 DIAGNOSIS — B20 HUMAN IMMUNODEFICIENCY VIRUS INFECTION (HCC): ICD-10-CM

## 2019-04-24 DIAGNOSIS — E55.9 VITAMIN D DEFICIENCY: ICD-10-CM

## 2019-04-24 LAB
HAV IGM SER IA-ACNC: NORMAL
HBV SURFACE AB TITR SER: NORMAL MIU/ML
HEPATITIS B SURFACE ANTIGEN INTERPRETATION: NORMAL

## 2019-04-24 PROCEDURE — G8427 DOCREV CUR MEDS BY ELIG CLIN: HCPCS | Performed by: INTERNAL MEDICINE

## 2019-04-24 PROCEDURE — 99214 OFFICE O/P EST MOD 30 MIN: CPT | Performed by: INTERNAL MEDICINE

## 2019-04-24 PROCEDURE — G8417 CALC BMI ABV UP PARAM F/U: HCPCS | Performed by: INTERNAL MEDICINE

## 2019-04-24 PROCEDURE — 4004F PT TOBACCO SCREEN RCVD TLK: CPT | Performed by: INTERNAL MEDICINE

## 2019-04-24 PROCEDURE — 3017F COLORECTAL CA SCREEN DOC REV: CPT | Performed by: INTERNAL MEDICINE

## 2019-04-24 RX ORDER — ERGOCALCIFEROL (VITAMIN D2) 1250 MCG
50000 CAPSULE ORAL WEEKLY
Qty: 4 CAPSULE | Refills: 0 | Status: SHIPPED | OUTPATIENT
Start: 2019-04-24 | End: 2019-12-23

## 2019-04-24 NOTE — PROGRESS NOTES
PATIENT WAS IN THE CLINIC TODAY AND RECEIVED THE FOLLOWING VACCINE PREVNAR 13 AND TDAP FROM THE [address] [place] [address] NURSE:      THIS NOTE IS SOLELY FOR DOCUMENTATION PURPOSES AND THERE IS NO CHARGE. THESE VACCINES WERE PAID FOR BY EITHER THE STATE OR THE PATIENT'S INSURANCE PLAN. PATIENT WAS INSTRUCTED TO WAIT 20 MINUTES AFTERWARDS, AND REPORT ANY ADVERSE REACTIONS, IMMEDIATELY.

## 2019-04-24 NOTE — PROGRESS NOTES
Initial HIV Consult      Patient Name: Tasneem Notice  YOB: 1965  Patient Sex: male  Medical Record Number: [id number]        Background:    Patient was diagnosed with HIV in 2000 in [place]. Had thrush at the time. HIs CD4 was noted to be 328 and genotype was negative for any resistance. Started on Combivir and Sustiva but quit taking meds a few years later. In 2007 was started on Atripla but stopped 3-4 years later. Then started taking Triumeq about 3 years ago but stopped taking that as well within a few years. Hx of crack use - $20 per day. Smokes 2-3 pacs of cigarettes at least per day. Has had over 100 sexual partners over his lifetime - males. Hx of multiple suicide attempts, including overdose of pills including lyrica, and still tells me that one day he intends to commit suicide when he is \"done with everything. \"  Does not like taking pills. States that he was seeing a psychiatrist but they put him on pills, which he did not take. And the pills only contributed to his depression. He has been institutionalized a few times for depression and suicide attempts. No STD history, he is edentulous, has a family hx of colon cancer and skin cancers. He had neck and lip squamous and basal cell cancers removed. Patient's father passed away from stage 4 renal cancer and his mother passed earlier in the year. His drug problem was out of control and he had stopped caring for himself. He moved away and recently returned October 2018. Father had stage 4 cancer renal cancer with mets and passed away. Mother passed away    Moved to [facility] temporarily after his father passed away and is back as of October 2018. Had a COPD exac on 10/8. Now has a new inhaler  Got a new PCP ( Dr Monica Goodwin )  Is taking a new sleeping pill prescribed and monitored by his PCP   Saw Dermatology and biopsy obtained. Had L arm skin cancer - BCC. follow up in a year planned.  Done with surgery 11/2018  Saw Dr Jared Montero for podiatry and surgery was 1/21/2019 - wound went down to bone. Awaiting path report. Had EGD on 1/16/2019 - no cancerous findings. Found a hernia  Has a ganglion cyst on R wrist, no surgery planned as of yet    He is still smoking but down to 1.5 PPD  Labs reviewed. Triumeq    Cut his toenails and now has cellulitis of the right foot with the surgical dressing. Did not tell anyone and the foot is red - can see the erythema of his toes. Review of Systems:    All 14 systems reviewed with patient and are negative other than as stated above. Medication History  Current Outpatient Medications   Medication Sig Dispense Refill    zolpidem (AMBIEN) 5 MG tablet Take 5 mg by mouth.  Pantoprazole Sodium (PROTONIX PO) Take by mouth      loratadine-pseudoephedrine (CLARITIN-D 24HR)  MG per extended release tablet Take 1 tablet by mouth daily 10 tablet 0    baclofen (LIORESAL) 10 MG tablet Take 10 mg by mouth      TRIUMEQ 600- MG TABS   0    albuterol sulfate HFA (PROAIR HFA) 108 (90 Base) MCG/ACT inhaler Use every 4 hours while awake for 7-10 days then PRN wheezing  Dispense with SPACER and Instruct on use. May sub Ventolin or Proventil as needed per [employer]. 1 Inhaler 1    pregabalin (LYRICA) 75 MG capsule Take 1 capsule by mouth 2 times daily for 30 days. . 60 capsule 2    pantoprazole (PROTONIX) 40 MG tablet Take 1 tablet by mouth daily 30 tablet 2     No current facility-administered medications for this visit.         Past Medical History  Past Medical History:   Diagnosis Date    Anxiety     Cancer ([address]     skin  L arm    Cellulitis of heel, right 2016    after surgery x 3 on tendion     Depression     Erectile dysfunction     Headache(784.0)     HIV (human immunodeficiency virus infection) ([address]     Hyperlipidemia     Liver disease     Osteoarthritis        Past Surgical History  Past Surgical History:   Procedure Laterality Date    CHOLECYSTECTOMY      CYST REMOVAL      R wrist x2    FOOT SURGERY      bone extraction    FRACTURE SURGERY      UPPER GASTROINTESTINAL ENDOSCOPY N/A 1/16/2019    EGD ESOPHAGOGASTRODUODENOSCOPY performed by Vanessa Corea MD at [address] [facility]       Allergies  Allergies   Allergen Reactions    Methadone Other (See Comments)     Chest pain  Chest pain    Morphine Other (See Comments)     Chest pain  Chest pains    Nexium [Esomeprazole Magnesium] Nausea Only and Nausea And Vomiting     Upset stomach  Upset stomach    Omeprazole Nausea Only and Nausea And Vomiting     Upset stomach  Upset stomach       Family History  Family History   Problem Relation Age of Onset    High Blood Pressure Mother     Cancer Father        Immunization History  Immunization History   Administered Date(s) Administered    Hepatitis B, unspecified formulation 06/09/2005, 07/01/2005, 12/02/2005    Influenza A (H1N1) Vaccine IM 11/25/2009    Influenza Vaccine, unspecified formulation 10/19/2011, 09/15/2014    Influenza Virus Vaccine 10/19/2011    Influenza Whole 01/26/2011    PPD Test 01/26/2011, 03/20/2012, 06/08/2015    Pneumococcal Conjugate 7-valent 03/16/2009    Tetanus 10/19/2006       Physical Exam  Vitals:    04/24/19 1053   BP: (!) 141/97   Pulse:    Resp:    Temp:        General: Patient appears in better spirits overall. HEENT: EOMI, Neck supple  Heart: S1 S2  Lungs: clear bilaterally to auscultation  Abdomen: soft, ND  Extrem:no calf pain, foot is stable.    Neuro exam: CN II-XII intact    Current labs  Lab Results   Component Value Date    TD4TKRH 440 12/17/2012    KB2SAWP 683 06/08/2012    FT5ONAQ 547 03/07/2012    ZK8RNWY 611 12/12/2011         Chemistry        Component Value Date/Time     04/17/2019 1450    K 3.9 04/17/2019 1450     04/17/2019 1450    CO2 23 04/17/2019 1450    BUN 12 04/17/2019 1450    CREATININE 0.91 04/17/2019 1450        Component Value Date/Time    CALCIUM 9.2 04/17/2019 1450    ALKPHOS 110 (H) 04/17/2019 1450 AST 16 04/17/2019 1450    ALT 16 04/17/2019 1450    BILITOT 0.3 04/17/2019 1450          No components found for: CHLPL  Lab Results   Component Value Date    TRIG 120 04/17/2019    TRIG 170 03/14/2018    TRIG 186 06/27/2017     Lab Results   Component Value Date    HDL 44 04/17/2019    HDL 28 (L) 03/14/2018    HDL 36 (L) 06/27/2017     Lab Results   Component Value Date    LDLCALC 119 04/17/2019    LDLCALC 62 03/14/2018    LDLCALC 106 06/27/2017     No results found for: LABVLDL  Lab Results   Component Value Date    HEPBCAB NEGATIVE 12/12/2011     Lab Results   Component Value Date    RPR NON REAC 03/07/2012       Impression/Plan  Given Course of Doxycycline for cellulitis while path report and micro are pending from [facility]. Need the report. HIV infection - triumeq. Doing well and undetectable. Suicide attempt history -No current plan. Currently seems in good spirits but is on sleeping pills by his PCP. We discussed this for a long time. Defer management to PCP. PCP aware of his history. Crack use - cutting down. Basal Cell Carcinoma - follow up with derm in a year. Had EGD done - no stricture and no cancer. Genotype reviewed - some NNRTI resistance. Continue Triumeq  Hx of renal injury  Tobacco use  vitamin D deficiency hx. Check on this. Prophylaxis needed: Bactrim until his percent comes up       Dr Cala Sever contacted: [phone] [facility]. Could not get in touch with office and will call again Monday. No follow up planned at this time. Had speech therapy, still having issues with throat. Concerned about stricture; colonoscopy needed -needs GI opinion.      Prevention  Substance abuse screening performed:   Crack  Counseling provided during this visit > 50% yes  Brief Mental Health Screening performed:  Yes - suicide attempts  Physical abuse screening performed: yes - none    Patient has been informed of the importance of protection during sexual encounters which include, but are not limited to vaginal intercourse, anal intercourse, and oral sex. Patient has also been made aware that it is a felony in the [address] to engage in a sexual encounter without first disclosing HIV status to partner. Plans for next visit:  Continue to follow up with psych due to hx of depression/suicide attempts. Needs eye doc visit  Needs a colonoscopy due to family hx of colon cancer   Needs pain management    Follow up: 4 months. Patient is re-establishing care.    Time spent with patient: 60 min    [address]

## 2019-04-24 NOTE — PROGRESS NOTES
Pt presented for follow up with Dr. Simón Carrillo. Pt stated he has been feeling well. Pt informed CM he will no longer participate in Support Groups. Pt stated he is not comfortable in big groups. CM provided support and encouragement. Per request, CM provided food and gas vouchers, need assessed. Pt has an appointment with PCP 4/24/19, for which he was able to secure transportation. CM will follow up with pt as needed.

## 2019-04-27 LAB — VITAMIN D 1,25-DIHYDROXY: 37.1 PG/ML (ref 19.9–79.3)

## 2019-05-13 ENCOUNTER — TELEPHONE (OUTPATIENT)
Dept: INFECTIOUS DISEASES | Age: 54
End: 2019-05-13

## 2019-05-13 NOTE — TELEPHONE ENCOUNTER
Per request, CM scheduled transport to appointment with PCP, Dr. Alesia Callejas, 5/24/19 @ 12N. CM assessed need, arrangements made as last resort.

## 2019-05-20 ENCOUNTER — TELEPHONE (OUTPATIENT)
Dept: INFECTIOUS DISEASES | Age: 54
End: 2019-05-20

## 2019-05-20 NOTE — TELEPHONE ENCOUNTER
Pt called in to clinic stating he lost his wallet and it had all his insurance information and 's license. Pt asking for printed copy of ID and Ins. Card. Will be in the office to  today.

## 2019-05-21 ENCOUNTER — TELEPHONE (OUTPATIENT)
Dept: INFECTIOUS DISEASES | Age: 54
End: 2019-05-21

## 2019-05-21 NOTE — TELEPHONE ENCOUNTER
Per request, CM scheduled transport to appointment with ortho 6/10/19 @ 2:45PM.  CM assessed need, made arrangements as last resort.

## 2019-06-04 ENCOUNTER — TELEPHONE (OUTPATIENT)
Dept: INFECTIOUS DISEASES | Age: 54
End: 2019-06-04

## 2019-06-04 NOTE — TELEPHONE ENCOUNTER
Pt called in stating his phone was not working at this time. His is requesting we use Emergency contact number for now. Munira Silva Brother/Sister [phone]     Updated [facility]. Updated transportation as well. Reviewed labs with pt. He is taking vit D now and will start b12 also. He states this will make him feel better. He will get OTC.      Denies other needs and will call with updates

## 2019-06-19 ENCOUNTER — TELEPHONE (OUTPATIENT)
Dept: INFECTIOUS DISEASES | Age: 54
End: 2019-06-19

## 2019-06-19 NOTE — TELEPHONE ENCOUNTER
Pt called in with update regarding his foot problems. He has a follow up with Dr Nilson Haider today in concern of reinfection of his r foot. Requested he call with updates. Pt is also requesting help with basic needs. Requesting food and gas voucher  Needs assessed. 1 food 1 gas given to pt as a walk in to clinic.     Denies other needs at this time and will call with concerns and/or updates

## 2019-06-20 ENCOUNTER — TELEPHONE (OUTPATIENT)
Dept: INFECTIOUS DISEASES | Age: 54
End: 2019-06-20

## 2019-06-20 NOTE — TELEPHONE ENCOUNTER
Per request, CM scheduled transport for appointment with [facility] 7/5/19 @ 8:30AM.  CM assessed need, made arrangements as last resort.

## 2019-06-26 ENCOUNTER — TELEPHONE (OUTPATIENT)
Dept: INFECTIOUS DISEASES | Age: 54
End: 2019-06-26

## 2019-06-26 NOTE — TELEPHONE ENCOUNTER
Per request, CM scheduled transport for appointment with Dr. Leonard Haji 7/26/19 @ 2:45PM.  CM assessed need, made arrangements as last resort.

## 2019-06-28 ENCOUNTER — TELEPHONE (OUTPATIENT)
Dept: INFECTIOUS DISEASES | Age: 54
End: 2019-06-28

## 2019-07-01 ENCOUNTER — TELEPHONE (OUTPATIENT)
Dept: INFECTIOUS DISEASES | Age: 54
End: 2019-07-01

## 2019-07-17 DIAGNOSIS — B20 HIV INFECTION, UNSPECIFIED SYMPTOM STATUS (HCC): ICD-10-CM

## 2019-07-17 DIAGNOSIS — B20 HIV INFECTION, UNSPECIFIED SYMPTOM STATUS (HCC): Primary | ICD-10-CM

## 2019-07-17 LAB
ALBUMIN SERPL-MCNC: 4.4 G/DL (ref 3.5–4.6)
ALP BLD-CCNC: 140 U/L (ref 35–104)
ALT SERPL-CCNC: 26 U/L (ref 0–41)
ANION GAP SERPL CALCULATED.3IONS-SCNC: 10 MEQ/L (ref 9–15)
AST SERPL-CCNC: 22 U/L (ref 0–40)
BILIRUB SERPL-MCNC: 0.3 MG/DL (ref 0.2–0.7)
BUN BLDV-MCNC: 14 MG/DL (ref 6–20)
CALCIUM SERPL-MCNC: 9.6 MG/DL (ref 8.5–9.9)
CHLORIDE BLD-SCNC: 103 MEQ/L (ref 95–107)
CO2: 27 MEQ/L (ref 20–31)
CREAT SERPL-MCNC: 1.12 MG/DL (ref 0.7–1.2)
GFR AFRICAN AMERICAN: >60
GFR NON-AFRICAN AMERICAN: >60
GLOBULIN: 3.7 G/DL (ref 2.3–3.5)
GLUCOSE BLD-MCNC: 99 MG/DL (ref 70–99)
HCT VFR BLD CALC: 55.4 % (ref 42–52)
HEMOGLOBIN: 18.9 G/DL (ref 14–18)
MCH RBC QN AUTO: 32.7 PG (ref 27–31.3)
MCHC RBC AUTO-ENTMCNC: 34.2 % (ref 33–37)
MCV RBC AUTO: 95.6 FL (ref 80–100)
PDW BLD-RTO: 14.7 % (ref 11.5–14.5)
PLATELET # BLD: 179 K/UL (ref 130–400)
POTASSIUM SERPL-SCNC: 5.2 MEQ/L (ref 3.4–4.9)
RBC # BLD: 5.8 M/UL (ref 4.7–6.1)
SODIUM BLD-SCNC: 140 MEQ/L (ref 135–144)
TOTAL PROTEIN: 8.1 G/DL (ref 6.3–8)
WBC # BLD: 7 K/UL (ref 4.8–10.8)

## 2019-07-18 LAB
ABSOLUTE CD 4 HELPER: 425 CELLS/UL (ref 430–1800)
CD4 % HELPER T CELL: 20 % (ref 32–64)
LYMPHOCYTE SUBSET PANEL 2 INFO: ABNORMAL

## 2019-07-19 ENCOUNTER — TELEPHONE (OUTPATIENT)
Dept: INFECTIOUS DISEASES | Age: 54
End: 2019-07-19

## 2019-07-20 LAB
HIV-1 QNT LOG, IU/ML: 1.69 LOG CPY/ML
HIV-1 QNT, IU/ML: 49
INTERPRETATION: DETECTED

## 2019-07-25 ENCOUNTER — TELEPHONE (OUTPATIENT)
Dept: INFECTIOUS DISEASES | Age: 54
End: 2019-07-25

## 2019-07-26 ENCOUNTER — OFFICE VISIT (OUTPATIENT)
Dept: INFECTIOUS DISEASES | Age: 54
End: 2019-07-26
Payer: MEDICARE

## 2019-07-26 VITALS
BODY MASS INDEX: 32.88 KG/M2 | DIASTOLIC BLOOD PRESSURE: 77 MMHG | TEMPERATURE: 98.1 F | WEIGHT: 222 LBS | SYSTOLIC BLOOD PRESSURE: 117 MMHG | RESPIRATION RATE: 20 BRPM | HEIGHT: 69 IN | HEART RATE: 97 BPM

## 2019-07-26 DIAGNOSIS — B20 SYMPTOMATIC HIV INFECTION (HCC): ICD-10-CM

## 2019-07-26 DIAGNOSIS — L03.115 CELLULITIS OF RIGHT LOWER EXTREMITY: Primary | ICD-10-CM

## 2019-07-26 DIAGNOSIS — F14.90 CRACK COCAINE USE: ICD-10-CM

## 2019-07-26 DIAGNOSIS — Z72.0 TOBACCO ABUSE: ICD-10-CM

## 2019-07-26 PROCEDURE — 3017F COLORECTAL CA SCREEN DOC REV: CPT | Performed by: INTERNAL MEDICINE

## 2019-07-26 PROCEDURE — 99214 OFFICE O/P EST MOD 30 MIN: CPT | Performed by: INTERNAL MEDICINE

## 2019-07-26 PROCEDURE — 4004F PT TOBACCO SCREEN RCVD TLK: CPT | Performed by: INTERNAL MEDICINE

## 2019-07-26 PROCEDURE — G8417 CALC BMI ABV UP PARAM F/U: HCPCS | Performed by: INTERNAL MEDICINE

## 2019-07-26 PROCEDURE — G8427 DOCREV CUR MEDS BY ELIG CLIN: HCPCS | Performed by: INTERNAL MEDICINE

## 2019-07-26 RX ORDER — TRAMADOL HYDROCHLORIDE 50 MG/1
50 TABLET ORAL EVERY 8 HOURS PRN
COMMUNITY
End: 2019-12-09 | Stop reason: ALTCHOICE

## 2019-07-26 RX ORDER — SULFAMETHOXAZOLE AND TRIMETHOPRIM 800; 160 MG/1; MG/1
1 TABLET ORAL 2 TIMES DAILY
Qty: 20 TABLET | Refills: 0 | Status: SHIPPED | OUTPATIENT
Start: 2019-07-26 | End: 2019-08-05

## 2019-07-26 RX ORDER — LORAZEPAM 0.5 MG/1
0.5 TABLET ORAL PRN
COMMUNITY
End: 2021-04-30 | Stop reason: ALTCHOICE

## 2019-07-30 ENCOUNTER — NURSE ONLY (OUTPATIENT)
Dept: INFECTIOUS DISEASES | Age: 54
End: 2019-07-30

## 2019-07-30 NOTE — PROGRESS NOTES
7/26/19  Dental Visit:  Kelley Elizondo initial visit 8/16/19    CD4:   Lab Results   Component Value Date    LABABSO 425 07/17/2019       Viral Load:  Lab Results   Component Value Date    [id number] 17,000 10/17/2018    WLP0VHYOKR 1.69 07/17/2019       Medical Insurance:  Payor: MEDICARE / Plan: MEDICARE PART A AND B / Product Type: *No Product type* /    OHDAP/HIPSCA:  OHDAP   Renewal Date:  7/30/19    Income:    SSDI    Legal Issues:    NA    Emergency Contact:   Extended Emergency Contact Information  Primary Emergency Contact: Claudia Estrada [address] Phone: [phone]  Work Phone: [phone]  Mobile Phone: [phone]  Relation: Brother/Sister  Pt presented for scheduled appointment with CM. Cm assisted with OHDAP renewal application for assistance with premium and medication co-pay assistance. During visit, CM completed Semi-Annual Review, no changes noted for RW Part A Eligibility for services. CM reviewed Grievance Policy, Sliding Scale Fee and Transportation Policy with pt. Cm completed PSA and SA/MH Assessments. Pt has hx of MH and SA. Pt refuses referral for counseling. CM encouraged pt to consider re-engaging in Support Group participation. CM provided information regarding next Group Meeting on 8/13/19. CM updated ISP, pt signed in agreement with POC. Pt reported missing doses of meds. VL is currently detectable. CM encouraged compliance with meds. Pt does have appointment with [facility] 8/16/19, CM provided gas cards to facilitate transport. Per request, CM provided food voucher, need assessed. CM will follow up with pt as needed.

## 2019-08-12 ENCOUNTER — TELEPHONE (OUTPATIENT)
Dept: INFECTIOUS DISEASES | Age: 54
End: 2019-08-12

## 2019-08-19 ENCOUNTER — TELEPHONE (OUTPATIENT)
Dept: INFECTIOUS DISEASES | Age: 54
End: 2019-08-19

## 2019-08-21 ENCOUNTER — TELEPHONE (OUTPATIENT)
Dept: INFECTIOUS DISEASES | Age: 54
End: 2019-08-21

## 2019-08-22 ENCOUNTER — TELEPHONE (OUTPATIENT)
Dept: INFECTIOUS DISEASES | Age: 54
End: 2019-08-22

## 2019-08-23 ENCOUNTER — NURSE ONLY (OUTPATIENT)
Dept: INFECTIOUS DISEASES | Age: 54
End: 2019-08-23

## 2019-08-23 ENCOUNTER — OFFICE VISIT (OUTPATIENT)
Dept: INFECTIOUS DISEASES | Age: 54
End: 2019-08-23
Payer: MEDICARE

## 2019-08-23 VITALS
BODY MASS INDEX: 32.73 KG/M2 | WEIGHT: 221 LBS | HEIGHT: 69 IN | HEART RATE: 73 BPM | TEMPERATURE: 98.1 F | RESPIRATION RATE: 18 BRPM | SYSTOLIC BLOOD PRESSURE: 129 MMHG | DIASTOLIC BLOOD PRESSURE: 88 MMHG

## 2019-08-23 DIAGNOSIS — L97.412 NEUROPATHIC ULCER OF RIGHT HEEL WITH FAT LAYER EXPOSED (HCC): ICD-10-CM

## 2019-08-23 DIAGNOSIS — F32.A DEPRESSION, UNSPECIFIED DEPRESSION TYPE: ICD-10-CM

## 2019-08-23 DIAGNOSIS — F14.90 CRACK COCAINE USE: ICD-10-CM

## 2019-08-23 DIAGNOSIS — B20 HIV INFECTION, UNSPECIFIED SYMPTOM STATUS (HCC): Primary | ICD-10-CM

## 2019-08-23 DIAGNOSIS — L03.115 CELLULITIS OF RIGHT FOOT: ICD-10-CM

## 2019-08-23 PROCEDURE — 4004F PT TOBACCO SCREEN RCVD TLK: CPT | Performed by: INTERNAL MEDICINE

## 2019-08-23 PROCEDURE — 3017F COLORECTAL CA SCREEN DOC REV: CPT | Performed by: INTERNAL MEDICINE

## 2019-08-23 PROCEDURE — 99214 OFFICE O/P EST MOD 30 MIN: CPT | Performed by: INTERNAL MEDICINE

## 2019-08-23 PROCEDURE — G8417 CALC BMI ABV UP PARAM F/U: HCPCS | Performed by: INTERNAL MEDICINE

## 2019-08-23 PROCEDURE — G8427 DOCREV CUR MEDS BY ELIG CLIN: HCPCS | Performed by: INTERNAL MEDICINE

## 2019-08-23 NOTE — PROGRESS NOTES
Pt. presented for follow-up appt. With Dr. Kelly Starr. CM met with patient, provided socialization. No specific issues discussed. Pt informed CM of wrist surgery being pushed to 9/30/19. CM provided active listening and support. CM provided food and gas vouchers, need assessed. CM will follow up with patient as needed.

## 2019-08-23 NOTE — PROGRESS NOTES
Pt presents to clinic for follow up appt. Pt states he smokes 2 PPD. Educated on the need to quit. Pt declined wanting to quit at this time. Pt states non compliance with medications. Pt states he is having trouble swallowing them. Pt states he is getting a pill  and then will start taking medications again. Educated pt on the need to not missing any doses and reach out to the clinic Immediately if he is having any issues with being 100% compliant with medications. Pt verbalized understanding. Pt states no other c/o at this time.

## 2019-08-29 ENCOUNTER — TELEPHONE (OUTPATIENT)
Dept: INFECTIOUS DISEASES | Age: 54
End: 2019-08-29

## 2019-08-29 NOTE — TELEPHONE ENCOUNTER
Pt presented in office, stated he is not feeling well, dizzy, \"clammy\". Pt referred to RN for BP and BS evalutaion. CM provided 1x1 for support. Per request, CM provided gas cards to facilitate transport, need assessed. CM will follow up with pt as needed.

## 2019-08-29 NOTE — TELEPHONE ENCOUNTER
Pt presented as walk in to clinic. Stated he was no feeling well today. States he is clammy and sweaty. /99  Heart rate 86   resp 18  Temp 98.5  Blood sugar 86    Denies sob, cp, n/v , pain. \"I just dont feel good\"   States he used to take  BP medication but stopped. States he has not ate today either. Discussed monitoring his BP at home. States he does have a monitor at him house and will re check his BP he also has blood sugar machine at home (his brothers)     Pt will recheck his BP and BS. He will call his PCP to make appt if BP increases and/or he is feeling worse. Discussed need to go to ER for chest pain, sob intolerable. . Requested he make a same day appt if possible with his PCP. He will call with updates.

## 2019-09-12 ENCOUNTER — TELEPHONE (OUTPATIENT)
Dept: INFECTIOUS DISEASES | Age: 54
End: 2019-09-12

## 2019-09-18 ENCOUNTER — TELEPHONE (OUTPATIENT)
Dept: INFECTIOUS DISEASES | Age: 54
End: 2019-09-18

## 2019-09-19 ENCOUNTER — TELEPHONE (OUTPATIENT)
Dept: INFECTIOUS DISEASES | Age: 54
End: 2019-09-19

## 2019-09-19 DIAGNOSIS — G43.909 MIGRAINE WITHOUT STATUS MIGRAINOSUS, NOT INTRACTABLE, UNSPECIFIED MIGRAINE TYPE: Primary | ICD-10-CM

## 2019-09-27 ENCOUNTER — TELEPHONE (OUTPATIENT)
Dept: INFECTIOUS DISEASES | Age: 54
End: 2019-09-27

## 2019-09-27 NOTE — TELEPHONE ENCOUNTER
Pt called in stating he needed to schedule lab appt for follow up labs for Dr Flori Leos. Pt is in need of help with transport,   Rn and pt reviewed additional transport options and assistance. It is determined he will use [facility] as last report for help. Discussed transport policy. Discussed need to call 2 hours prior to apt time for cancellation. Pt verbalized all understanding. [facility] notified. Pt aware transport will be there for  about 30 mins prior to apt. All appts are related to medical treatment and are necessary for compliance    Appt: Lab appt scheduled for 10. 2.19 at 915 am.  He is also requesting help with follow up appt for Dr Flori Leos 11.13.19 at 80 AM    Denies any needs at this time. Denies any issues that need Doctor attention.  Will call with concerns

## 2019-09-30 ENCOUNTER — TELEPHONE (OUTPATIENT)
Dept: INFECTIOUS DISEASES | Age: 54
End: 2019-09-30

## 2019-10-02 ENCOUNTER — NURSE ONLY (OUTPATIENT)
Dept: INFECTIOUS DISEASES | Age: 54
End: 2019-10-02

## 2019-10-02 DIAGNOSIS — B20 HUMAN IMMUNODEFICIENCY VIRUS (HCC): Primary | ICD-10-CM

## 2019-10-02 LAB
FOLATE: 3.9 NG/ML (ref 7.3–26.1)
HBA1C MFR BLD: 5.2 % (ref 4.8–5.9)
TSH SERPL DL<=0.05 MIU/L-ACNC: 0.99 UIU/ML (ref 0.44–3.86)
VITAMIN B-12: <150 PG/ML (ref 232–1245)
VITAMIN D 25-HYDROXY: 20.7 NG/ML (ref 30–100)

## 2019-10-14 ENCOUNTER — TELEPHONE (OUTPATIENT)
Dept: INFECTIOUS DISEASES | Age: 54
End: 2019-10-14

## 2019-10-15 ENCOUNTER — TELEPHONE (OUTPATIENT)
Dept: INFECTIOUS DISEASES | Age: 54
End: 2019-10-15

## 2019-10-24 ENCOUNTER — TELEPHONE (OUTPATIENT)
Dept: INFECTIOUS DISEASES | Age: 54
End: 2019-10-24

## 2019-10-29 ENCOUNTER — TELEPHONE (OUTPATIENT)
Dept: INFECTIOUS DISEASES | Age: 54
End: 2019-10-29

## 2019-11-13 ENCOUNTER — TELEPHONE (OUTPATIENT)
Dept: INFECTIOUS DISEASES | Age: 54
End: 2019-11-13

## 2019-11-14 ENCOUNTER — TELEPHONE (OUTPATIENT)
Dept: INFECTIOUS DISEASES | Age: 54
End: 2019-11-14

## 2019-11-19 ENCOUNTER — HOSPITAL ENCOUNTER (EMERGENCY)
Age: 54
Discharge: HOME | End: 2019-11-19
Attending: EMERGENCY MEDICINE
Payer: MEDICARE

## 2019-11-19 ENCOUNTER — TELEPHONE (OUTPATIENT)
Dept: INFECTIOUS DISEASES | Age: 54
End: 2019-11-19

## 2019-11-19 ENCOUNTER — APPOINTMENT (OUTPATIENT)
Dept: GENERAL RADIOLOGY | Age: 54
End: 2019-11-19
Payer: MEDICARE

## 2019-11-19 VITALS
DIASTOLIC BLOOD PRESSURE: 85 MMHG | SYSTOLIC BLOOD PRESSURE: 115 MMHG | TEMPERATURE: 98.7 F | WEIGHT: 214 LBS | HEART RATE: 75 BPM | RESPIRATION RATE: 20 BRPM | BODY MASS INDEX: 31.7 KG/M2 | HEIGHT: 69 IN | OXYGEN SATURATION: 95 %

## 2019-11-19 DIAGNOSIS — S91.301A OPEN WOUND OF RIGHT HEEL, INITIAL ENCOUNTER: Primary | ICD-10-CM

## 2019-11-19 LAB
ANION GAP SERPL CALCULATED.3IONS-SCNC: 13 MEQ/L (ref 9–15)
BASOPHILS ABSOLUTE: 0.1 K/UL (ref 0–0.2)
BASOPHILS RELATIVE PERCENT: 0.7 %
BUN BLDV-MCNC: 12 MG/DL (ref 6–20)
C-REACTIVE PROTEIN: 36.2 MG/L (ref 0–5)
CALCIUM SERPL-MCNC: 9.5 MG/DL (ref 8.5–9.9)
CHLORIDE BLD-SCNC: 101 MEQ/L (ref 95–107)
CO2: 24 MEQ/L (ref 20–31)
CREAT SERPL-MCNC: 1.25 MG/DL (ref 0.7–1.2)
EOSINOPHILS ABSOLUTE: 0.1 K/UL (ref 0–0.7)
EOSINOPHILS RELATIVE PERCENT: 1.1 %
GFR AFRICAN AMERICAN: >60
GFR NON-AFRICAN AMERICAN: >60
GLUCOSE BLD-MCNC: 109 MG/DL (ref 70–99)
HCT VFR BLD CALC: 51.6 % (ref 42–52)
HEMOGLOBIN: 17.7 G/DL (ref 14–18)
LYMPHOCYTES ABSOLUTE: 1.9 K/UL (ref 1–4.8)
LYMPHOCYTES RELATIVE PERCENT: 25.2 %
MCH RBC QN AUTO: 31.7 PG (ref 27–31.3)
MCHC RBC AUTO-ENTMCNC: 34.4 % (ref 33–37)
MCV RBC AUTO: 92.3 FL (ref 80–100)
MONOCYTES ABSOLUTE: 0.7 K/UL (ref 0.2–0.8)
MONOCYTES RELATIVE PERCENT: 9.2 %
NEUTROPHILS ABSOLUTE: 4.7 K/UL (ref 1.4–6.5)
NEUTROPHILS RELATIVE PERCENT: 63.8 %
PDW BLD-RTO: 14.2 % (ref 11.5–14.5)
PLATELET # BLD: 194 K/UL (ref 130–400)
POTASSIUM SERPL-SCNC: 4.3 MEQ/L (ref 3.4–4.9)
RBC # BLD: 5.59 M/UL (ref 4.7–6.1)
SEDIMENTATION RATE, ERYTHROCYTE: 19 MM (ref 0–20)
SODIUM BLD-SCNC: 138 MEQ/L (ref 135–144)
WBC # BLD: 7.4 K/UL (ref 4.8–10.8)

## 2019-11-19 PROCEDURE — 85025 COMPLETE CBC W/AUTO DIFF WBC: CPT

## 2019-11-19 PROCEDURE — 85652 RBC SED RATE AUTOMATED: CPT

## 2019-11-19 PROCEDURE — 6370000000 HC RX 637 (ALT 250 FOR IP): Performed by: EMERGENCY MEDICINE

## 2019-11-19 PROCEDURE — 80048 BASIC METABOLIC PNL TOTAL CA: CPT

## 2019-11-19 PROCEDURE — 73630 X-RAY EXAM OF FOOT: CPT

## 2019-11-19 PROCEDURE — 36415 COLL VENOUS BLD VENIPUNCTURE: CPT

## 2019-11-19 PROCEDURE — 99283 EMERGENCY DEPT VISIT LOW MDM: CPT

## 2019-11-19 PROCEDURE — 86140 C-REACTIVE PROTEIN: CPT

## 2019-11-19 RX ORDER — LORAZEPAM 2 MG/ML
0.5 INJECTION INTRAMUSCULAR ONCE
Status: DISCONTINUED | OUTPATIENT
Start: 2019-11-19 | End: 2019-11-20 | Stop reason: HOSPADM

## 2019-11-19 RX ORDER — NICOTINE 21 MG/24HR
1 PATCH, TRANSDERMAL 24 HOURS TRANSDERMAL ONCE
Status: DISCONTINUED | OUTPATIENT
Start: 2019-11-19 | End: 2019-11-20 | Stop reason: HOSPADM

## 2019-11-19 ASSESSMENT — ENCOUNTER SYMPTOMS
COLOR CHANGE: 1
SORE THROAT: 0
SHORTNESS OF BREATH: 0
ABDOMINAL PAIN: 0
VOMITING: 0
DIARRHEA: 0
BACK PAIN: 0
NAUSEA: 0

## 2019-11-19 ASSESSMENT — PAIN SCALES - WONG BAKER: WONGBAKER_NUMERICALRESPONSE: 2

## 2019-11-25 ENCOUNTER — HOSPITAL ENCOUNTER (OUTPATIENT)
Dept: WOUND CARE | Age: 54
Discharge: HOME | End: 2019-11-25
Payer: MEDICARE

## 2019-11-25 ENCOUNTER — TELEPHONE (OUTPATIENT)
Dept: INFECTIOUS DISEASES | Age: 54
End: 2019-11-25

## 2019-11-25 VITALS
BODY MASS INDEX: 30.64 KG/M2 | HEART RATE: 89 BPM | WEIGHT: 214 LBS | TEMPERATURE: 99.1 F | DIASTOLIC BLOOD PRESSURE: 80 MMHG | HEIGHT: 70 IN | RESPIRATION RATE: 20 BRPM | SYSTOLIC BLOOD PRESSURE: 119 MMHG

## 2019-11-25 PROCEDURE — 99213 OFFICE O/P EST LOW 20 MIN: CPT

## 2019-11-25 RX ORDER — SUMATRIPTAN 50 MG/1
50 TABLET, FILM COATED ORAL
COMMUNITY

## 2019-11-27 ENCOUNTER — TELEPHONE (OUTPATIENT)
Dept: INFECTIOUS DISEASES | Age: 54
End: 2019-11-27

## 2019-12-09 ENCOUNTER — HOSPITAL ENCOUNTER (OUTPATIENT)
Dept: WOUND CARE | Age: 54
Discharge: HOME | End: 2019-12-09
Payer: MEDICARE

## 2019-12-09 ENCOUNTER — TELEPHONE (OUTPATIENT)
Dept: INFECTIOUS DISEASES | Age: 54
End: 2019-12-09

## 2019-12-09 VITALS
TEMPERATURE: 98.7 F | RESPIRATION RATE: 20 BRPM | HEART RATE: 69 BPM | DIASTOLIC BLOOD PRESSURE: 67 MMHG | SYSTOLIC BLOOD PRESSURE: 103 MMHG

## 2019-12-09 PROCEDURE — 87186 SC STD MICRODIL/AGAR DIL: CPT

## 2019-12-09 PROCEDURE — 87147 CULTURE TYPE IMMUNOLOGIC: CPT

## 2019-12-09 PROCEDURE — 87077 CULTURE AEROBIC IDENTIFY: CPT

## 2019-12-09 PROCEDURE — 87205 SMEAR GRAM STAIN: CPT

## 2019-12-09 PROCEDURE — 11042 DBRDMT SUBQ TIS 1ST 20SQCM/<: CPT

## 2019-12-09 PROCEDURE — 87070 CULTURE OTHR SPECIMN AEROBIC: CPT

## 2019-12-09 RX ORDER — FOLIC ACID 1 MG/1
TABLET ORAL
Refills: 1 | COMMUNITY
Start: 2019-10-04 | End: 2021-04-30 | Stop reason: ALTCHOICE

## 2019-12-09 RX ORDER — TOPIRAMATE 25 MG/1
25 TABLET ORAL
COMMUNITY

## 2019-12-10 ENCOUNTER — TELEPHONE (OUTPATIENT)
Dept: INFECTIOUS DISEASES | Age: 54
End: 2019-12-10

## 2019-12-10 DIAGNOSIS — B20 HIV INFECTION, UNSPECIFIED SYMPTOM STATUS (HCC): Primary | ICD-10-CM

## 2019-12-11 ENCOUNTER — NURSE ONLY (OUTPATIENT)
Dept: INFECTIOUS DISEASES | Age: 54
End: 2019-12-11

## 2019-12-11 DIAGNOSIS — B20 HUMAN IMMUNODEFICIENCY VIRUS (HCC): Primary | ICD-10-CM

## 2019-12-11 DIAGNOSIS — B20 HIV INFECTION, UNSPECIFIED SYMPTOM STATUS (HCC): ICD-10-CM

## 2019-12-11 LAB
ALBUMIN SERPL-MCNC: 3.9 G/DL (ref 3.5–4.6)
ALP BLD-CCNC: 104 U/L (ref 35–104)
ALT SERPL-CCNC: 23 U/L (ref 0–41)
ANION GAP SERPL CALCULATED.3IONS-SCNC: 12 MEQ/L (ref 9–15)
AST SERPL-CCNC: 19 U/L (ref 0–40)
BILIRUB SERPL-MCNC: 0.3 MG/DL (ref 0.2–0.7)
BUN BLDV-MCNC: 10 MG/DL (ref 6–20)
CALCIUM SERPL-MCNC: 9.4 MG/DL (ref 8.5–9.9)
CHLORIDE BLD-SCNC: 99 MEQ/L (ref 95–107)
CO2: 26 MEQ/L (ref 20–31)
CREAT SERPL-MCNC: 1.08 MG/DL (ref 0.7–1.2)
GFR AFRICAN AMERICAN: >60
GFR NON-AFRICAN AMERICAN: >60
GLOBULIN: 3.9 G/DL (ref 2.3–3.5)
GLUCOSE BLD-MCNC: 80 MG/DL (ref 70–99)
HCT VFR BLD CALC: 52 % (ref 42–52)
HEMOGLOBIN: 17.5 G/DL (ref 14–18)
MCH RBC QN AUTO: 31.8 PG (ref 27–31.3)
MCHC RBC AUTO-ENTMCNC: 33.6 % (ref 33–37)
MCV RBC AUTO: 94.5 FL (ref 80–100)
PDW BLD-RTO: 14.4 % (ref 11.5–14.5)
PLATELET # BLD: 198 K/UL (ref 130–400)
POTASSIUM SERPL-SCNC: 4 MEQ/L (ref 3.4–4.9)
RBC # BLD: 5.51 M/UL (ref 4.7–6.1)
SODIUM BLD-SCNC: 137 MEQ/L (ref 135–144)
TOTAL PROTEIN: 7.8 G/DL (ref 6.3–8)
WBC # BLD: 7.3 K/UL (ref 4.8–10.8)

## 2019-12-12 ENCOUNTER — TELEPHONE (OUTPATIENT)
Dept: INFECTIOUS DISEASES | Age: 54
End: 2019-12-12

## 2019-12-12 LAB
ABSOLUTE CD 4 HELPER: 367 CELLS/UL (ref 430–1800)
CD4 % HELPER T CELL: 18 % (ref 32–64)
GRAM STAIN RESULT: ABNORMAL
LYMPHOCYTE SUBSET PANEL 2 INFO: ABNORMAL
ORGANISM: ABNORMAL
WOUND/ABSCESS: ABNORMAL

## 2019-12-13 ENCOUNTER — TELEPHONE (OUTPATIENT)
Dept: WOUND CARE | Age: 54
End: 2019-12-13

## 2019-12-13 ENCOUNTER — TELEPHONE (OUTPATIENT)
Dept: INFECTIOUS DISEASES | Age: 54
End: 2019-12-13

## 2019-12-13 RX ORDER — SULFAMETHOXAZOLE AND TRIMETHOPRIM 800; 160 MG/1; MG/1
1 TABLET ORAL 2 TIMES DAILY
Qty: 28 TABLET | Refills: 0 | Status: SHIPPED | OUTPATIENT
Start: 2019-12-13 | End: 2019-12-27

## 2019-12-14 LAB
HIV-1 QNT LOG, IU/ML: <1.47 LOG CPY/ML
HIV-1 QNT, IU/ML: ABNORMAL CPY/ML
INTERPRETATION: DETECTED

## 2019-12-17 ENCOUNTER — TELEPHONE (OUTPATIENT)
Dept: INFECTIOUS DISEASES | Age: 54
End: 2019-12-17

## 2019-12-18 ENCOUNTER — TELEPHONE (OUTPATIENT)
Dept: INFECTIOUS DISEASES | Age: 54
End: 2019-12-18

## 2019-12-19 ENCOUNTER — TELEPHONE (OUTPATIENT)
Dept: INFECTIOUS DISEASES | Age: 54
End: 2019-12-19

## 2019-12-23 ENCOUNTER — HOSPITAL ENCOUNTER (OUTPATIENT)
Dept: WOUND CARE | Age: 54
Discharge: HOME | End: 2019-12-23
Payer: MEDICARE

## 2019-12-23 VITALS
RESPIRATION RATE: 18 BRPM | HEART RATE: 68 BPM | DIASTOLIC BLOOD PRESSURE: 72 MMHG | TEMPERATURE: 97.6 F | SYSTOLIC BLOOD PRESSURE: 101 MMHG

## 2019-12-23 PROCEDURE — 99212 OFFICE O/P EST SF 10 MIN: CPT

## 2019-12-26 ENCOUNTER — TELEPHONE (OUTPATIENT)
Dept: INFECTIOUS DISEASES | Age: 54
End: 2019-12-26

## 2020-01-08 ENCOUNTER — HOSPITAL ENCOUNTER (OUTPATIENT)
Dept: NEUROLOGY | Age: 55
Discharge: HOME | End: 2020-01-08
Payer: MEDICARE

## 2020-01-08 PROCEDURE — 95886 MUSC TEST DONE W/N TEST COMP: CPT

## 2020-01-08 PROCEDURE — 95910 NRV CNDJ TEST 7-8 STUDIES: CPT

## 2020-01-08 NOTE — PROCEDURES
[address]                      [facility], [address] [facility]                             ELECTROMYOGRAM REPORT    PATIENT NAME: Cary Mullins                  :        1965  MED REC NO:   [id number]                            ROOM:  ACCOUNT NO:   [de-identified]                           ADMIT DATE: 2020  PROVIDER:     Carla Fleming MD    DATE OF EM2020    REFERRING PROVIDER:  Carla Fleming MD.    REASON FOR STUDY:  The patient was having numbness in his hands. He has  a positive family history of diabetes. FINDINGS:  Motor nerve conduction velocities are slowed in the left ulnar  nerve over the elbow segments and normal in all the other nerves  tested. F-wave latency is delayed in the left ulnar nerve and borderline in all  other nerves tested. Distal motor latency is normal in the right ulnar nerve, mildly delayed  in the left ulnar nerve, and delayed in the median nerves bilaterally. Distal sensory latencies are borderline in the ulnar nerves and  moderately delayed in the median nerves. On concentric needle electrode examination, mild denervation changes are  present in the small muscles of the hand and also in the right thenar  muscles. CLINICAL IMPRESSION:  EMG studies are consistent with:  1. Moderate bilateral median nerve compression neuropathy at the wrists  consistent with the diagnosis of moderate bilateral carpal tunnel  syndrome. 2.  Mild compression of the left ulnar nerve at the elbow. He could be tried on conservative management with the elbow pad, occupational therapy etc.    The patient has a positive family history of diabetes and needs to be  watched out. The patient has been given the option of surgical intervention for the  carpal tunnel syndrome. He will let me know. If clinically indicated, I may repeat the study in a year.         Rashmi Marinelli MD    D: 2020 14:54:11       T:

## 2020-01-09 ENCOUNTER — TELEPHONE (OUTPATIENT)
Dept: INFECTIOUS DISEASES | Age: 55
End: 2020-01-09

## 2020-01-15 ENCOUNTER — TELEPHONE (OUTPATIENT)
Dept: INFECTIOUS DISEASES | Age: 55
End: 2020-01-15

## 2020-01-15 NOTE — TELEPHONE ENCOUNTER
pt into clinic as walk in requesting help with basic needs. Requesting food/gas voucher  Needs assessed. 2 gas 1 food voucher  given to pt. Denies other needs at this time and will call with concerns and/or updates      Denies any needs at this time. Denies any issues that need Doctor attention.  Will call with concerns

## 2020-01-17 ENCOUNTER — NURSE ONLY (OUTPATIENT)
Dept: INFECTIOUS DISEASES | Age: 55
End: 2020-01-17

## 2020-01-17 ENCOUNTER — OFFICE VISIT (OUTPATIENT)
Dept: INFECTIOUS DISEASES | Age: 55
End: 2020-01-17
Payer: MEDICARE

## 2020-01-17 VITALS
HEART RATE: 74 BPM | WEIGHT: 220.4 LBS | RESPIRATION RATE: 16 BRPM | SYSTOLIC BLOOD PRESSURE: 112 MMHG | TEMPERATURE: 97.8 F | DIASTOLIC BLOOD PRESSURE: 81 MMHG | HEIGHT: 70 IN | BODY MASS INDEX: 31.55 KG/M2

## 2020-01-17 PROCEDURE — 4004F PT TOBACCO SCREEN RCVD TLK: CPT | Performed by: INTERNAL MEDICINE

## 2020-01-17 PROCEDURE — G8926 SPIRO NO PERF OR DOC: HCPCS | Performed by: INTERNAL MEDICINE

## 2020-01-17 PROCEDURE — G8417 CALC BMI ABV UP PARAM F/U: HCPCS | Performed by: INTERNAL MEDICINE

## 2020-01-17 PROCEDURE — G8484 FLU IMMUNIZE NO ADMIN: HCPCS | Performed by: INTERNAL MEDICINE

## 2020-01-17 PROCEDURE — 3023F SPIROM DOC REV: CPT | Performed by: INTERNAL MEDICINE

## 2020-01-17 PROCEDURE — G8427 DOCREV CUR MEDS BY ELIG CLIN: HCPCS | Performed by: INTERNAL MEDICINE

## 2020-01-17 PROCEDURE — 3017F COLORECTAL CA SCREEN DOC REV: CPT | Performed by: INTERNAL MEDICINE

## 2020-01-17 PROCEDURE — 99214 OFFICE O/P EST MOD 30 MIN: CPT | Performed by: INTERNAL MEDICINE

## 2020-01-17 NOTE — PROGRESS NOTES
(IMITREX) 50 MG tablet Take 50 mg by mouth once as needed for Migraine      SODIUM CHLORIDE, EXTERNAL, 0.9 % SOLN Apply 1 Applicatorful topically daily 1000 mL 2    LORazepam (ATIVAN) 0.5 MG tablet Take 0.5 mg by mouth as needed for Anxiety.  ergocalciferol (ERGOCALCIFEROL) 07670 units capsule Take 1 capsule by mouth once a week for 8 days 4 capsule 0    pregabalin (LYRICA) 75 MG capsule Take 1 capsule by mouth 2 times daily for 30 days. . 60 capsule 2    Pantoprazole Sodium (PROTONIX PO) Take by mouth      TRIUMEQ 600- MG TABS   0    albuterol sulfate HFA (PROAIR HFA) 108 (90 Base) MCG/ACT inhaler Use every 4 hours while awake for 7-10 days then PRN wheezing  Dispense with SPACER and Instruct on use. May sub Ventolin or Proventil as needed per [employer]. 1 Inhaler 1     No current facility-administered medications for this visit.         Past Medical History  Past Medical History:   Diagnosis Date    Anxiety     Cancer [address] [address]     skin  L arm    Cellulitis of heel, right 2016    after surgery x 3 on tendion     Depression     Erectile dysfunction     Headache(784.0)     HIV (human immunodeficiency virus infection) ([facility]     Hyperlipidemia     Liver disease     Osteoarthritis        Past Surgical History  Past Surgical History:   Procedure Laterality Date    CHOLECYSTECTOMY      CYST REMOVAL      R wrist x2    FOOT SURGERY      bone extraction    FRACTURE SURGERY      UPPER GASTROINTESTINAL ENDOSCOPY N/A 1/16/2019    EGD ESOPHAGOGASTRODUODEN[facility] [address] [facility]       Allergies  Allergies   Allergen Reactions    Methadone Other (See Comments)     Chest pain  Chest pain    Morphine Other (See Comments)     Chest pain  Chest pains    Nexium [Esomeprazole Magnesium] Nausea Only and Nausea And Vomiting     Upset stomach  Upset stomach    Omeprazole Nausea Only and Nausea And Vomiting     Upset stomach  Upset stomach       Family History  Family History   Problem Relation Age of Onset    High Blood Pressure Mother     Cancer Father        Immunization History  Immunization History   Administered Date(s) Administered    Hepatitis B 06/09/2005, 07/01/2005, 12/02/2005    Hepatitis B vaccine 06/09/2005, 07/01/2005, 12/02/2005    Influenza 10/19/2011    Influenza A ([id number]) Vaccine IM 11/25/2009    Influenza Vaccine, unspecified formulation 10/19/2011, 09/15/2014    Influenza Whole 01/26/2011    PPD Test 01/26/2011, 03/20/2012, 06/08/2015    Pneumococcal Conjugate 13-valent ([id number]) 04/24/2019    Pneumococcal Conjugate 7-valent (Prevnar7) 03/16/2009    Td vaccine (adult) 10/19/2006    Tdap (Boostrix, Adacel) 04/24/2019    Tetanus 10/19/2006       Physical Exam  General: Patient appears ok  HEENT: EOMI, Neck supple  Heart: S1 S2  Lungs: clear bilaterally to auscultation  Breast exam including the axillary regions - no evidence of nipple retraction or asymmetry. Nipples are bilaterally prominent but no discharge. No palpable masses. No discoloration. He keeps trying to point out the differences that he is noting in the shape of the nipples. Abdomen: soft, ND  Extrem:no calf pain, foot is stable.    Neuro exam: CN II-XII intact    Current labs  Lab Results   Component Value Date    ZZ7NRVM 440 12/17/2012    PY7RLMO 683 06/08/2012    MJ1LDPP 547 03/07/2012    FA4RHLM 611 12/12/2011         Chemistry        Component Value Date/Time     12/11/2019 1249    K 4.0 12/11/2019 1249    CL 99 12/11/2019 1249    CO2 26 12/11/2019 1249    BUN 10 12/11/2019 1249    CREATININE 1.08 12/11/2019 1249        Component Value Date/Time    CALCIUM 9.4 12/11/2019 1249    ALKPHOS 104 12/11/2019 1249    AST 19 12/11/2019 1249    ALT 23 12/11/2019 1249    BILITOT 0.3 12/11/2019 1249          No components found for: CHLPL  Lab Results   Component Value Date    TRIG 120 04/17/2019    TRIG 170 03/14/2018    TRIG 186 06/27/2017     Lab Results   Component Value Date HDL 44 04/17/2019    HDL 28 (L) 03/14/2018    HDL 36 (L) 06/27/2017     Lab Results   Component Value Date    LDLCALC 119 04/17/2019    LDLCALC 62 03/14/2018    LDLCALC 106 06/27/2017     No results found for: LABVLDL  Lab Results   Component Value Date    HEPAIGM Non-reactive 04/24/2019    HEPBCAB NEGATIVE 12/12/2011     Lab Results   Component Value Date    RPR NON REAC 03/07/2012       Impression/Plan   HIV infection - triumeq. Doing well and undetectable. Patient states that he wants to follow up with me every 3 months at the most.   Concern for mammary/ nipple abnormality in a male - can ultrasound. Cellulitis of R foot with chronic OM - stable at the present time. MRSA on last culture. Suicide attempt history   Crack use - daily   Basal Cell Carcinoma/squamous cell cancer  - follow up with derm as directed. Had EGD done - no stricture and no cancer. Genotype reviewed - some NNRTI resistance. Continue Triumeq  Hx of renal injury  Tobacco use  vitamin D deficiency hx. Check on this with next labs. Prophylaxis needed: Bactrim until his percent comes up       Prevention  Substance abuse screening performed:   Crack  Counseling provided during this visit > 50% yes  Brief Mental Health Screening performed:  Yes - suicide attempts  Physical abuse screening performed: yes - none    Patient has been informed of the importance of protection during sexual encounters which include, but are not limited to vaginal intercourse, anal intercourse, and oral sex. Patient has also been made aware that it is a felony in the [facility] [address] [facility] [address] to engage in a sexual encounter without first disclosing HIV status to partner.     [address]

## 2020-01-20 ENCOUNTER — TELEPHONE (OUTPATIENT)
Dept: INFECTIOUS DISEASES | Age: 55
End: 2020-01-20

## 2020-01-22 ENCOUNTER — TELEPHONE (OUTPATIENT)
Dept: INFECTIOUS DISEASES | Age: 55
End: 2020-01-22

## 2020-01-22 NOTE — TELEPHONE ENCOUNTER
Pt presents to clinic requesting assistance enrolling in [facility]. Call placed to [address] [place] [address]. Enrolled pt into program. Updated insurance information provided. V.O. given for Triumeq 1 Q.D. #30 RF 5 and Ventolin inhaler 2 puffs BID PRN RF1. Pharmacist provided correct read back. Per [facility] pt's benefits will be reviewed and shipment will be set up. Pt notified to also have his other providers call his prescriptions to them. Pt verbalized understanding. Will continue to follow.

## 2020-01-29 NOTE — PROGRESS NOTES
Ultrasound order placed per dr Nelia Arce. Call placed to pt. Pre service center information discussed and he will call next week. Denies other needs at this time. Feeling down this month due to loss of parents.

## 2020-01-31 ENCOUNTER — TELEPHONE (OUTPATIENT)
Dept: INFECTIOUS DISEASES | Age: 55
End: 2020-01-31

## 2020-02-04 ENCOUNTER — TELEPHONE (OUTPATIENT)
Dept: INFECTIOUS DISEASES | Age: 55
End: 2020-02-04

## 2020-02-04 ENCOUNTER — HOSPITAL ENCOUNTER (OUTPATIENT)
Dept: WOMENS IMAGING | Age: 55
Discharge: HOME | End: 2020-02-06
Payer: MEDICARE

## 2020-02-04 ENCOUNTER — APPOINTMENT (OUTPATIENT)
Dept: ULTRASOUND IMAGING | Age: 55
End: 2020-02-04
Payer: MEDICARE

## 2020-02-04 PROCEDURE — 77066 DX MAMMO INCL CAD BI: CPT

## 2020-02-04 NOTE — TELEPHONE ENCOUNTER
Call received from pt stating [facility] has not called yet to schedule delivery of his medication. [facility]. [facility] did not have correct phone number listed for pt. Correct number provided. [facility] to call pt today to schedule delivery. Pt aware.

## 2020-02-11 ENCOUNTER — TELEPHONE (OUTPATIENT)
Dept: INFECTIOUS DISEASES | Age: 55
End: 2020-02-11

## 2020-02-18 ENCOUNTER — TELEPHONE (OUTPATIENT)
Dept: INFECTIOUS DISEASES | Age: 55
End: 2020-02-18

## 2020-02-21 ENCOUNTER — NURSE ONLY (OUTPATIENT)
Dept: INFECTIOUS DISEASES | Age: 55
End: 2020-02-21

## 2020-02-21 NOTE — PROGRESS NOTES
CM scheduled an appointment for Annual Review and OHDAP Application, not realizing both were completed previously. CM provided socialization, no specific issues discussed. CM provided food vouchers, need assessed.

## 2020-02-26 ENCOUNTER — TELEPHONE (OUTPATIENT)
Dept: INFECTIOUS DISEASES | Age: 55
End: 2020-02-26

## 2020-03-09 ENCOUNTER — TELEPHONE (OUTPATIENT)
Dept: INFECTIOUS DISEASES | Age: 55
End: 2020-03-09

## 2020-03-09 NOTE — TELEPHONE ENCOUNTER
Pt called in requesting to walk in clinic. He is in need of help with [facility]     Pt walk in to clinic- call placed to [facility]. Shipping and mediation clarified. He requested meds to be shipped to ID office. This was set up for delivery 3.10.2020. Denies other needs.

## 2020-03-11 ENCOUNTER — TELEPHONE (OUTPATIENT)
Dept: INFECTIOUS DISEASES | Age: 55
End: 2020-03-11

## 2020-03-12 ENCOUNTER — TELEPHONE (OUTPATIENT)
Dept: INFECTIOUS DISEASES | Age: 55
End: 2020-03-12

## 2020-03-12 NOTE — TELEPHONE ENCOUNTER
Per request, Cm scheduled transport to [facility] to renew benefits 3/13/20 @ 7:30AM.  Cm assessed need, made arrangements as last resort.

## 2020-03-24 ENCOUNTER — TELEPHONE (OUTPATIENT)
Dept: INFECTIOUS DISEASES | Age: 55
End: 2020-03-24

## 2020-03-27 ENCOUNTER — TELEPHONE (OUTPATIENT)
Dept: INFECTIOUS DISEASES | Age: 55
End: 2020-03-27

## 2020-03-27 NOTE — TELEPHONE ENCOUNTER
RN Intake      Patient ID:   Bonnie Schwab  Date:   3/27/2020      Client ID:      [id number]  [place] [phone] (home)   [x] OK to leave voicemail     Family/House:    [] Partner  [] Children  [x] Other - family  Housing:   with family    Transport:  public transportation--- policy reviewed used as last resort only patient is aware and verbalized understanding   Employment:  [employer]    Mental Health:   Discussed referral if needed. Denies needing or wanting referral at this time.    Substance Abuse:   Social History     Socioeconomic History    Marital status: Single     Spouse name: Not on file    Number of children: Not on file    Years of education: Not on file    Highest education level: Not on file   Occupational History    Not on file   Social Needs    Financial resource strain: Not on file    Food insecurity     Worry: Not on file     Inability: Not on file    Transportation needs     Medical: Not on file     Non-medical: Not on file   Tobacco Use    Smoking status: Current Every Day Smoker     Packs/day: 2.00     Years: 44.00     Pack years: 88.00     Types: Cigarettes    Smokeless tobacco: Never Used    Tobacco comment: 3-4 PPD   Substance and Sexual Activity    Alcohol use: No     Alcohol/week: 0.0 standard drinks    Drug use: Yes     Types: Cocaine, Marijuana     Comment: crack     Sexual activity: Not on file   Lifestyle    Physical activity     Days per week: Not on file     Minutes per session: Not on file    Stress: Not on file   Relationships    Social connections     Talks on phone: Not on file     Gets together: Not on file     Attends Restorationist service: Not on file     Active member of club or organization: Not on file     Attends meetings of clubs or organizations: Not on file     Relationship status: Not on file    Intimate partner violence     Fear of current or ex partner: Not on file     Emotionally abused: Not on file     Physically abused: Not on file     Forced sexual activity: Not on file   Other Topics Concern    Not on file   Social History Narrative    Not on file     Smoking----2 PPD still. . discussed option to help quit--just not ready to quit. ETOH --denies  Drugs-- admits to stilling smoking crack \"when ever I can, I am broke right now so last time as about 2-3 days ago\"-- does not want any type of rehab. Diagnosis:   Date of diagnosis:     Transmition:  homosexual contact   Signs and symptoms:   [] Diarrhea  [] Night sweats   [] Dizziness  [] Chronic fatigue   [] Vomiting  [] Nausea   [] Hand/feet pain [] Headaches   [] Swollen lymph [] Skin rash   [] Fevers  [] Body aches   [] Weight loss  [] Other - foot issues. He follows with podiatry  Opportunistic Infections:  AT THIS TIME--NA   [] Thrush -   [] Wasting   [] KS   [] PCP   [] CMV   [] STD -        Briefly reviewed PrEP, and U=U, undetectable equals un-transmittable. He verbalized he was aware. No partner NSA    Health/Healthcare:     PCP Visit:  Dr Linda Santos   ID Visit:  Dr Ramya Hussein Visit:  Nov 2019 got dentures  Discussed importance of twice a year visits. Encouraged follow up. Last lab work:  As below. Discussed need for new labs prior to appt. [facility] office lab canceled  due to concerns for Corona Virus. Reassurance to pt and education providied regarding virus Pt labs stable and denies complaints. Patient was informed that our practice is making every effort to adhere to current recommendations, including social distancing. For the health and safety of our patients, and to prevent unnecessary exposure,     Discussed option for [facility] patient lab in [facility] or [facility] out patient lab. These remain open. He will look at his schedule and call CM if transport is needed for help.    CD4:   Lab Results   Component Value Date    LABABSO 367 12/11/2019       Viral Load:  Lab Results   Component Value Date    [id number] 17,000 10/17/2018    PFQ1HYTYKR <1.47 NA  Emergency Contact:   Extended Emergency Contact Information  Primary Emergency Contact: Lucero Zavaleta [facility] [address]  Phone: [phone]  Work Phone: [phone]  Mobile Phone: [phone]  Relation: Brother/Sister  Confirmed no changes  Concerns:   Drug use. Pt is not ready to quit  Support System:  Family and friends  Denies problems or concerns with support system. RN Comments:  Doing ok. Staying healthy and inside due to corona virus. Reassurance to pt providied regarding virus. Pt most recent labs reviewed and  are stable and denies complaints. Was able to reschedule pt with out complaints or concerns. for a Virtual visit  With Dr Anat Miller 4.15.2020  Denies problems. Discussed with pt to call if problems occur before appt or for further questions/concerns. He verbalized understanding. Denies any needs at this time. Denies any issues that need Doctor attention. Will call with concerns     For the health and safety of our patients, and to prevent unnecessary exposure, we are currently scheduling telephone or Virtual appointments. Patient was informed that these appointments are official appointments and will be billed through patient's insurance. Patient confirms this and agreed to the telephone visit and or Virtual visit. Denies any needs at this time. Denies any issues that need Doctor attention. Will call with concerns    Updates. No changes to insurance. No changes to income. Will provide when eligibility is updated. HIV primary care-- hospital out pt  Housing-- stable  HIV risk counseling--- yes  Mental health-- yes  Substance abuse--- yes       Conducted this yearly update via phone with as precaution for [id number] during public health emergency.

## 2020-03-27 NOTE — TELEPHONE ENCOUNTER
Per request, CM scheduled transport to have lab work done 3.31.20 @ [address] [place] [address].  CM assessed need, made arrangements as last resort.

## 2020-03-31 DIAGNOSIS — E78.9 LIPID DISORDER: ICD-10-CM

## 2020-03-31 DIAGNOSIS — E55.9 VITAMIN D DEFICIENCY: ICD-10-CM

## 2020-03-31 DIAGNOSIS — B20 SYMPTOMATIC HIV INFECTION (HCC): ICD-10-CM

## 2020-03-31 LAB
ALBUMIN SERPL-MCNC: 4.2 G/DL (ref 3.5–4.6)
ALP BLD-CCNC: 112 U/L (ref 35–104)
ALT SERPL-CCNC: 20 U/L (ref 0–41)
ANION GAP SERPL CALCULATED.3IONS-SCNC: 15 MEQ/L (ref 9–15)
AST SERPL-CCNC: 21 U/L (ref 0–40)
BILIRUB SERPL-MCNC: 0.3 MG/DL (ref 0.2–0.7)
BUN BLDV-MCNC: 16 MG/DL (ref 6–20)
CALCIUM SERPL-MCNC: 9.6 MG/DL (ref 8.5–9.9)
CHLORIDE BLD-SCNC: 103 MEQ/L (ref 95–107)
CHOLESTEROL, TOTAL: 208 MG/DL (ref 0–199)
CO2: 22 MEQ/L (ref 20–31)
CREAT SERPL-MCNC: 1.13 MG/DL (ref 0.7–1.2)
GFR AFRICAN AMERICAN: >60
GFR NON-AFRICAN AMERICAN: >60
GLOBULIN: 3.6 G/DL (ref 2.3–3.5)
GLUCOSE BLD-MCNC: 122 MG/DL (ref 70–99)
HCT VFR BLD CALC: 54.5 % (ref 42–52)
HDLC SERPL-MCNC: 41 MG/DL (ref 40–59)
HEMOGLOBIN: 18.1 G/DL (ref 14–18)
HEPATITIS C ANTIBODY INTERPRETATION: NORMAL
LDL CHOLESTEROL CALCULATED: 112 MG/DL (ref 0–129)
MCH RBC QN AUTO: 30.5 PG (ref 27–31.3)
MCHC RBC AUTO-ENTMCNC: 33.1 % (ref 33–37)
MCV RBC AUTO: 92.2 FL (ref 80–100)
PDW BLD-RTO: 14.8 % (ref 11.5–14.5)
PLATELET # BLD: 169 K/UL (ref 130–400)
POTASSIUM SERPL-SCNC: 4.5 MEQ/L (ref 3.4–4.9)
RBC # BLD: 5.91 M/UL (ref 4.7–6.1)
RPR: NORMAL
SODIUM BLD-SCNC: 140 MEQ/L (ref 135–144)
TOTAL PROTEIN: 7.8 G/DL (ref 6.3–8)
TRIGL SERPL-MCNC: 277 MG/DL (ref 0–150)
VITAMIN D 25-HYDROXY: 15.8 NG/ML (ref 30–100)
WBC # BLD: 6.8 K/UL (ref 4.8–10.8)

## 2020-04-01 LAB
ABSOLUTE CD 4 HELPER: 402 CELLS/UL (ref 430–1800)
CD4 % HELPER T CELL: 18 % (ref 32–64)
LYMPHOCYTE SUBSET PANEL 2 INFO: ABNORMAL

## 2020-04-02 LAB
HIV-1 QNT LOG, IU/ML: <1.47 LOG CPY/ML
HIV-1 QNT, IU/ML: ABNORMAL CPY/ML
INTERPRETATION: DETECTED
QUANTI TB GOLD PLUS: NEGATIVE
QUANTI TB1 MINUS NIL: 0 IU/ML (ref 0–0.34)
QUANTI TB2 MINUS NIL: 0 IU/ML (ref 0–0.34)
QUANTIFERON MITOGEN: 6.51 IU/ML
QUANTIFERON NIL: 0.04 IU/ML

## 2020-04-03 LAB
C. TRACHOMATIS DNA ,URINE: NEGATIVE
N. GONORRHOEAE DNA, URINE: NEGATIVE

## 2020-04-13 ENCOUNTER — TELEPHONE (OUTPATIENT)
Dept: INFECTIOUS DISEASES | Age: 55
End: 2020-04-13

## 2020-04-13 NOTE — TELEPHONE ENCOUNTER
Call placed to pt regarding need to reschedule per Dr Sharee Sotomayor request as she is out of office. Pt most recent labs reviewed and  are stable and denies complaints. Was able to reschedule pt with out complaints or concerns. [phone]    Patient was informed that our practice is making every effort to adhere to current recommendations, including social distancing. For the health and safety of our patients, and to prevent unnecessary exposure, we are currently scheduling telephone appointments. Patient was informed that these appointments are official appointments and will be billed through patient's insurance. Patient confirms this and agreed to the telephone and or video visit. Denies problems. Discussed with pt to call if problems occur before appt or for further questions/concerns. He verbalized understanding.

## 2020-05-18 ENCOUNTER — TELEPHONE (OUTPATIENT)
Dept: INFECTIOUS DISEASES | Age: 55
End: 2020-05-18

## 2020-05-19 ENCOUNTER — TELEPHONE (OUTPATIENT)
Dept: INFECTIOUS DISEASES | Age: 55
End: 2020-05-19

## 2020-05-20 ENCOUNTER — NURSE ONLY (OUTPATIENT)
Dept: INFECTIOUS DISEASES | Age: 55
End: 2020-05-20

## 2020-05-20 NOTE — PROGRESS NOTES
Visit:  [place] 1/20    CD4:   Lab Results   Component Value Date    LABABSO 402 03/31/2020       Viral Load:  Lab Results   Component Value Date    [id number] 17,000 10/17/2018    RPP5ULOMYW <1.47 03/31/2020       Medical Insurance:  Payor: MEDICARE / Plan: MEDICARE PART A AND B / Product Type: *No Product type* /    OHDAP/HIPSCA:  OHDAP   Renewal Date:  5/20/20    Income:    SSDI    Legal Issues:    NA    Emergency Contact:   Extended Emergency Contact Information  Primary Emergency Contact: Melba Bauer [place] [address]  Phone: [phone]  Work Phone: [phone]  Mobile Phone: [phone]  Relation: Brother/Sister  Appointment completed via phone per Covid-19 Guidelines. CM called pt for scheduled appointment. CM assisted with OHDAP Application for premium and co-pay assistance. CM completed Semi-Annual Review, no changes noted to RW Part A Eligibility. CM reviewed Grievance Policy, Sliding Scale Fee and Transportation Policy with pt. CM completed PSA and SA/MH Assessments. Pt has hx of MH and hx of SA. Pt refuses counseling, feels no need. CM updated ISP, pt signed in agreement with POC. Although pt has hx of non-compliance, pt's VL from 1/20 is undetectable. Pt last saw dentist at [facility] 1/20. Pt is not sexually active, is aware of U=U, undetectable equals un-transmittable. CM provided socialization, pt discussed his thoughts on the Covid-19 pandemic. CM provided active listening and support. CM will follow up with pt as needed.

## 2020-06-05 ENCOUNTER — VIRTUAL VISIT (OUTPATIENT)
Dept: INFECTIOUS DISEASES | Age: 55
End: 2020-06-05
Payer: MEDICARE

## 2020-06-05 PROCEDURE — 99443 PR PHYS/QHP TELEPHONE EVALUATION 21-30 MIN: CPT | Performed by: INTERNAL MEDICINE

## 2020-06-05 NOTE — PROGRESS NOTES
SURGERY      bone extraction    FRACTURE SURGERY      UPPER GASTROINTESTINAL ENDOSCOPY N/A 1/16/2019    EGD ESOPHAGOGASTRODUODENOSCOPY performed by Hector Henriquez MD at [address] [facility]       Allergies  Allergies   Allergen Reactions    Methadone Other (See Comments)     Chest pain  Chest pain    Morphine Other (See Comments)     Chest pain  Chest pains    Nexium [Esomeprazole Magnesium] Nausea Only and Nausea And Vomiting     Upset stomach  Upset stomach    Omeprazole Nausea Only and Nausea And Vomiting     Upset stomach  Upset stomach       Family History  Family History   Problem Relation Age of Onset    High Blood Pressure Mother     Cancer Father        Immunization History  Immunization History   Administered Date(s) Administered    Hepatitis B 06/09/2005, 07/01/2005, 12/02/2005    Hepatitis B vaccine 06/09/2005, 07/01/2005, 12/02/2005    Influenza 10/19/2011    Influenza A (Y5C4-66) Vaccine IM 11/25/2009    Influenza Vaccine, unspecified formulation 10/19/2011, 09/15/2014    Influenza Whole 01/26/2011    PPD Test 01/26/2011, 03/20/2012, 06/08/2015    Pneumococcal Conjugate 13-valent ([id number]) 04/24/2019    Pneumococcal Conjugate 7-valent (Prevnar7) 03/16/2009    Td vaccine (adult) 10/19/2006    Tdap (Boostrix, Adacel) 04/24/2019    Tetanus 10/19/2006     Since we cannot conduct an in-person exam, the following were addressed with the patient. I have asked the patient to assist in their exam:  Patient denies any general new issues. Patient does not observe any new skin rashes   R achilles as above. Patient does not perceive any new visual deficits  Patient does not feel like they are \"clammy\" or sweating  Patient denies any dry mouth or sore mouth and is able to flex and extend her neck with ease. Patient can inhale and exhale without any difficulty and feels like their chest is expanding symmetrically.  They do not feel short of breath or any distress when asked to move around. No pain with expansion of the chest  Patient is able to feel their own pulse and agrees it is regular. Patient states their abdomen is not protruberant beyond their normal size. States that there is no pain when touching the area beneath the sternum when directed, or the left or the right side of the abdomen. They feel no pain when asked to press on the suprapubic area or the right or left flank. Patient denies any pain when asked to squeeze both upper legs and lower legs anteriorly or posteriorly. They do not see any new swelling of their joints. No observed neurological changes or slurred speech when speaking to the patient        Current labs  Lab Results   Component Value Date    GD8UGCC 440 12/17/2012    JM1HIXG 683 06/08/2012    YU9RQTA 547 03/07/2012    ND6PMYL 611 12/12/2011         Chemistry        Component Value Date/Time     03/31/2020 0748    K 4.5 03/31/2020 0748     03/31/2020 0748    CO2 22 03/31/2020 0748    BUN 16 03/31/2020 0748    CREATININE 1.13 03/31/2020 0748        Component Value Date/Time    CALCIUM 9.6 03/31/2020 0748    ALKPHOS 112 (H) 03/31/2020 0748    AST 21 03/31/2020 0748    ALT 20 03/31/2020 0748    BILITOT 0.3 03/31/2020 0748          No components found for: CHLPL  Lab Results   Component Value Date    TRIG 277 (H) 03/31/2020    TRIG 120 04/17/2019    TRIG 170 03/14/2018     Lab Results   Component Value Date    HDL 41 03/31/2020    HDL 44 04/17/2019    HDL 28 (L) 03/14/2018     Lab Results   Component Value Date    LDLCALC 112 03/31/2020    LDLCALC 119 04/17/2019    LDLCALC 62 03/14/2018     No results found for: LABVLDL  Lab Results   Component Value Date    HEPAIGM Non-reactive 04/24/2019    HEPBCAB NEGATIVE 12/12/2011     Lab Results   Component Value Date    RPR NON REAC 03/07/2012       Impression/Plan   HIV infection - triumeq. Noncompliant. Cellulitis of R foot with chronic OM - MRSA on last culture. Lanced it himself.  Because I am unable to see

## 2020-06-08 ENCOUNTER — TELEPHONE (OUTPATIENT)
Dept: INFECTIOUS DISEASES | Age: 55
End: 2020-06-08

## 2020-06-09 ENCOUNTER — TELEPHONE (OUTPATIENT)
Dept: INFECTIOUS DISEASES | Age: 55
End: 2020-06-09

## 2020-07-02 ENCOUNTER — HOSPITAL ENCOUNTER (OUTPATIENT)
Dept: CT IMAGING | Age: 55
Discharge: HOME | End: 2020-07-04
Payer: MEDICARE

## 2020-07-02 VITALS — WEIGHT: 228 LBS | HEIGHT: 69 IN | BODY MASS INDEX: 33.77 KG/M2

## 2020-07-02 PROCEDURE — 6360000004 HC RX CONTRAST MEDICATION: Performed by: FAMILY MEDICINE

## 2020-07-02 PROCEDURE — 71260 CT THORAX DX C+: CPT

## 2020-07-02 RX ADMIN — IOPAMIDOL 75 ML: 612 INJECTION, SOLUTION INTRAVENOUS at 09:31

## 2020-07-08 ENCOUNTER — TELEPHONE (OUTPATIENT)
Dept: INFECTIOUS DISEASES | Age: 55
End: 2020-07-08

## 2020-07-08 NOTE — TELEPHONE ENCOUNTER
Pt called into office in need of help with transport for up coming apt. .   Rn and pt reviewed additional transport options and assistance. It is determined he will use [facility] as last report for help. Aware of policy and  time. [facility] notified. All appts are related to medical treatment and are necessary for compliance      Appt:   Dr Gregg Núñez 7.10.2020 at 215 p    Denies any needs at this time. Denies any issues that need Doctor attention.  Will call with concerns

## 2020-07-09 ENCOUNTER — TELEPHONE (OUTPATIENT)
Dept: INFECTIOUS DISEASES | Age: 55
End: 2020-07-09

## 2020-07-09 NOTE — TELEPHONE ENCOUNTER
CM called pt to remind him of scheduled transport for appointment with Dr. Geeta Nunes 7/10/20 @ 2:15PM.  Pt stated he does not have an appointment with Dr. Geeta Nunes. Pt has appointment for U/S, scheduled by Dr. Geeta Nunes. Appointment is at [facility]. [facility] will make changes to transport scheduled. CM will follow up with pt as needed.

## 2020-07-14 ENCOUNTER — TELEPHONE (OUTPATIENT)
Dept: INFECTIOUS DISEASES | Age: 55
End: 2020-07-14

## 2020-07-14 NOTE — TELEPHONE ENCOUNTER
Per request, CM scheduled transport for appointment with PCP today at [facility].  CM assessed need, made arrangements as last resort.

## 2020-07-14 NOTE — TELEPHONE ENCOUNTER
Pt called into office in need of help with transport for up coming apt. .   Rn and pt reviewed additional transport options and assistance. It is determined he will use [facility] as last report for help. Aware of policy and  time. [facility] notified. All appts are related to medical treatment and are necessary for compliance      Appt: Dr Adrianne Grier ENT 7.27.2020 at 11 am  Address: [address], [facility]. [facility]   Phone: [phone]      Denies any needs at this time. Denies any issues that need Doctor attention.  Will call with concer

## 2020-07-16 ENCOUNTER — TELEPHONE (OUTPATIENT)
Dept: INFECTIOUS DISEASES | Age: 55
End: 2020-07-16

## 2020-07-16 NOTE — TELEPHONE ENCOUNTER
Pt called into clinic requesting help with basic needs. Requesting food/gas voucher    Pt into clinc  Needs assessed. 2 food vouchers given to pt. Provided hand  and cleaning wipes, RW Part A Provision, to reduce the spread of Covid-19. Denies any needs at this time. Denies any issues that need Doctor attention.  Will call with concerns

## 2020-07-23 ENCOUNTER — TELEPHONE (OUTPATIENT)
Dept: INFECTIOUS DISEASES | Age: 55
End: 2020-07-23

## 2020-07-23 NOTE — TELEPHONE ENCOUNTER
Pt called into clinic requesting help with basic needs. Requesting food/gas voucher    Pt into clinc  Needs assessed. 2 gas cards given to pt. He has Dr. Taras Vigil today. Has PPE.      Denies any issues that need Doctor attention  Denies other needs at this time and will call with concerns and/or updates

## 2020-08-05 ENCOUNTER — TELEPHONE (OUTPATIENT)
Dept: INFECTIOUS DISEASES | Age: 55
End: 2020-08-05

## 2020-08-05 NOTE — TELEPHONE ENCOUNTER
Pt called in stating he needed to cancel transport due to other transportation option. pt will call with other needed transport as last resort  Pt will call with updates or further issues. Call placed to [facility]. Transport canceled with in appropriate time frame.

## 2020-08-06 ENCOUNTER — TELEPHONE (OUTPATIENT)
Dept: INFECTIOUS DISEASES | Age: 55
End: 2020-08-06

## 2020-08-06 NOTE — TELEPHONE ENCOUNTER
Pt called into office in need of help with transport for up coming apt. .   Rn and pt reviewed additional transport options and assistance. It is determined he will use [facility] as last report for help. Aware of policy and  time. [facility] notified. All appts are related to medical treatment and are necessary for compliance    Appt:   Dr Nitesh Gold 8.7.2020 at 2 pm.   ( B12 INJ)    Denies any needs at this time. Denies any issues that need Doctor attention.  Will call with concerns

## 2020-08-10 ENCOUNTER — TELEPHONE (OUTPATIENT)
Dept: INFECTIOUS DISEASES | Age: 55
End: 2020-08-10

## 2020-08-10 NOTE — TELEPHONE ENCOUNTER
Per request, CM scheduled transport to appointment with Hematology/Oncology due to high red blood cell count. Pt has an appointment with Dr. Cecilia Escamilla, 8/14/20 @ 9AM.  CM assessed need, made arrangenments as last resort. CM provided support and encouragement. CM will follow up with pt as needed.

## 2020-08-10 NOTE — TELEPHONE ENCOUNTER
Pt called into office in need of help with transport for up coming apt. .   Rn and pt reviewed additional transport options and assistance. It is determined he will use [facility] as last report for help. Aware of policy and  time. [facility] notified. All appts are related to medical treatment and are necessary for compliance      Appt:   8.25.2020 [address] apt time 1130 (BX) drop off only    Denies any needs at this time. Denies any issues that need Doctor attention.  Will call with concerns

## 2020-08-13 ENCOUNTER — TELEPHONE (OUTPATIENT)
Dept: INFECTIOUS DISEASES | Age: 55
End: 2020-08-13

## 2020-08-13 NOTE — TELEPHONE ENCOUNTER
Pt called into clinic requesting help with basic needs. Requesting food/gas voucher-- pt has apt 8.25.220 for bx. Transport one way and is having sister pick him up. Pt into clinc  Needs assessed. 2 gas given to pt. Denies other needs at this time and will call with concerns and/or updates      Denies any needs at this time. Denies any issues that need Doctor attention.  Will call with concerns

## 2020-08-14 ENCOUNTER — TELEPHONE (OUTPATIENT)
Dept: INFECTIOUS DISEASES | Age: 55
End: 2020-08-14

## 2020-08-14 NOTE — TELEPHONE ENCOUNTER
Pt called into office in need of help with transport for up coming apt. .   Rn and pt reviewed additional transport options and assistance. It is determined he will use [facility] as last report for help. Aware of policy and  time. [facility] notified. All appts are related to medical treatment and are necessary for compliance      Appt: [facility] 9.11.2020 at 3 pm    Denies any needs at this time. Denies any issues that need Doctor attention.  Will call with concerns

## 2020-08-21 ENCOUNTER — TELEPHONE (OUTPATIENT)
Dept: INFECTIOUS DISEASES | Age: 55
End: 2020-08-21

## 2020-08-21 NOTE — TELEPHONE ENCOUNTER
Pt called into office in need of help with transport for up coming apt. .   Rn and pt reviewed additional transport options and assistance. It is determined he will use [facility] as last report for help. Aware of policy and  time. [facility] notified. All appts are related to medical treatment and are necessary for compliance      Appt:   Dr Claudia Reich 9.9.2020 at [phone]     Denies any needs at this time. Denies any issues that need Doctor attention.  Will call with concerns

## 2020-08-24 ENCOUNTER — TELEPHONE (OUTPATIENT)
Dept: INFECTIOUS DISEASES | Age: 55
End: 2020-08-24

## 2020-08-24 NOTE — TELEPHONE ENCOUNTER
Pt called in stating he needed to cancel transport 8.25.220 due to facility reschedule. Call placed to [facility]. Transport canceled with in approbate time frame. Apt was rescheduled   Rn and pt reviewed additional transport options and assistance. It is determined he will use [facility] as last report for help. Aware of policy and  time. [facility] notified. All appts are related to medical treatment and are necessary for compliance      Appt:     9.1.2020 [address] apt time 930 (BX) drop off only  Denies any needs at this time. Denies any issues that need Doctor attention.  Will call with concerns

## 2020-09-01 ENCOUNTER — TELEPHONE (OUTPATIENT)
Dept: INFECTIOUS DISEASES | Age: 55
End: 2020-09-01

## 2020-09-01 NOTE — TELEPHONE ENCOUNTER
Pt phoned, will be picked up by transportation at 8:30AM.  Pt requested to stop at this office to  a copy of his medical card. Pt cannot find card, will be having a medical procedure at [address] this morning. CM will authorize transport to be rerouted, need assessed.

## 2020-09-04 ENCOUNTER — TELEPHONE (OUTPATIENT)
Dept: INFECTIOUS DISEASES | Age: 55
End: 2020-09-04

## 2020-09-04 NOTE — TELEPHONE ENCOUNTER
Per request, CM scheduled transport to medical appointment 9/17/20 @ 12N. CM assessed need, made arrangements as last resort.

## 2020-09-09 ENCOUNTER — TELEPHONE (OUTPATIENT)
Dept: INFECTIOUS DISEASES | Age: 55
End: 2020-09-09

## 2020-09-09 ENCOUNTER — VIRTUAL VISIT (OUTPATIENT)
Dept: INFECTIOUS DISEASES | Age: 55
End: 2020-09-09
Payer: MEDICARE

## 2020-09-09 PROCEDURE — 99443 PR PHYS/QHP TELEPHONE EVALUATION 21-30 MIN: CPT | Performed by: INTERNAL MEDICINE

## 2020-09-09 NOTE — PROGRESS NOTES
Call placed to pt to discuss V/V apt.    Labs ordered per Dr Stacey Watson  VM left for pt to call back

## 2020-09-09 NOTE — TELEPHONE ENCOUNTER
Spoke with pt regarding V/V follow up. Pt is happy to report no CA. He will be following up with PCP. Reviewed needed lab work. 2 week follow up scheduled.      Denies other needs

## 2020-09-09 NOTE — PROGRESS NOTES
2020      Patient Name: Karin Carrion  YOB: 1965  Patient Sex: male  Medical Record Number: [id number]    2020    TELEHEALTH EVALUATION -- Audio/Visual (During KJN-93 public health emergency)      Karin Carrion (:  1965) has requested an audio/video evaluation for the following concern(s):    HIV    Due to the COVID-19 outbreak, patient's office visit was converted to a virtual visit. Patient was contacted and agreed to proceed with a virtual visit with me via Doxy. me with my location at the office. Patient reports that they are located at home. The risks and benefits of converting to a virtual visit were discussed in light of the current infectious disease epidemic. Services were provided through an audio/video synchronous discussion virtually to substitute for in person clinic visit. THIS virtual visit was conducted via interactive/real-time audio/video. Due to this being a TeleHealth encounter, evaluation of the following organ systems is limited: Vitals/Constitutional/EENT/Resp/CV/GI//MS/Neuro/Skin/Heme-Lymph-Imm.}    Pursuant to the emergency declaration under the [facility], [facility] waiver authority and the [employer] Act, this Virtual Visit was conducted, with patient's consent, to reduce the patient's risk of exposure to COVID-19 and provide care for the patient. Background:    Patient was diagnosed with HIV in  in [facility]. Had thrush at the time. HIs CD4 was noted to be 328 and genotype was negative for any resistance. Started on Combivir and Sustiva but quit taking meds a few years later. In  was started on Atripla but stopped 3-4 years later. Then started taking Triumeq about 3 years ago but stopped taking that as well within a few years. He is back on triumeq and compliant with medications.     Hx of crack use and tobacco. Has had over 100 sexual partners over his lifetime - males. Hx of multiple suicide attempts,He has been institutionalized a few times for depression and suicide attempts. No STD history, he is edentulous, has a family hx of colon cancer and skin cancers. He had neck and lip squamous and basal cell cancers removed. Father had stage 4 cancer renal cancer with mets and passed away. Mother passed away as well. Moved to [place] temporarily after his father passed away and is back as of October 2018. Got a new PCP ( Dr Zeyad Rodríguez )  Saw Dr Roxann August for podiatry and surgery was 1/21/2019 - wound went down to bone. Persistent issues with cellulitis of the ankle. He tends to pick at the wound and ashlee abscesses himself. + MRSA hx.   Had EGD on 1/16/2019 - no cancerous findings. Has had issues with the achilles again and lanced it himself. Tells me now that he has been very bad with medications. No recent labs done - none since March 2020. Supposed to be onTriumeq - has not been taking it    Apparently was recently diagnosed with thyroid nodules and is in the work up process - he had CT chest which showed a 15mm nodule, followed by an ultrasound that showed 2 L sided nodules and 1 R sided nodule. Follows up with his PCP Dr Claudia Reich today for results of his pathology from biopsy  Apparently getting vitamin B injections - last done ~ 2-3 months ago. He has not had any labs done for his HIV since 3/2020. Review of Systems:  All 14 systems reviewed with patient and are negative other than as stated above.     Past Medical History  Past Medical History:   Diagnosis Date    Anxiety     Cancer ([address]     skin  L arm    Cellulitis of heel, right 2016    after surgery x 3 on tendion     Depression     Erectile dysfunction     Headache(784.0)     HIV (human immunodeficiency virus infection) ([address]     Hyperlipidemia     Liver disease     Osteoarthritis        Past Surgical History  Past Surgical History:   Procedure Laterality Date    CHOLECYSTECTOMY      CYST REMOVAL      R wrist x2    FOOT SURGERY      bone extraction    FRACTURE SURGERY      UPPER GASTROINTESTINAL ENDOSCOPY N/A 1/16/2019    EGD ESOPHAGOGASTRODUODENOSCOPY performed [facility]       Allergies  Allergies   Allergen Reactions    Methadone Other (See Comments)     Chest pain  Chest pain    Morphine Other (See Comments)     Chest pain  Chest pains    Nexium [Esomeprazole Magnesium] Nausea Only and Nausea And Vomiting     Upset stomach  Upset stomach    Omeprazole Nausea Only and Nausea And Vomiting     Upset stomach  Upset stomach       Family History  Family History   Problem Relation Age of Onset    High Blood Pressure Mother     Cancer Father        Immunization History  Immunization History   Administered Date(s) Administered    Hepatitis B 06/09/2005, 07/01/2005, 12/02/2005    Hepatitis B vaccine 06/09/2005, 07/01/2005, 12/02/2005    Influenza 10/19/2011    Influenza A ([id number]) Vaccine IM 11/25/2009    Influenza Vaccine, unspecified formulation 10/19/2011, 09/15/2014    Influenza Whole 01/26/2011    PPD Test 01/26/2011, 03/20/2012, 06/08/2015    Pneumococcal Conjugate 13-valent (Aihrtfe27) 04/24/2019    Pneumococcal Conjugate 7-valent (Prevnar7) 03/16/2009    Td vaccine (adult) 10/19/2006    Tdap (Boostrix, Adacel) 04/24/2019    Tetanus 10/19/2006     Since we cannot conduct an in-person exam, the following were addressed with the patient. I have asked the patient to assist in their exam:  Patient denies any general new issues. Patient does not observe any new skin rashes   R achilles as above. Patient does not perceive any new visual deficits  Patient does not feel like they are \"clammy\" or sweating  Patient denies any dry mouth or sore mouth and is able to flex and extend her neck with ease. Patient can inhale and exhale without any difficulty and feels like their chest is expanding symmetrically.  They do not feel short of breath or any distress when asked to move around. No pain with expansion of the chest  Patient is able to feel their own pulse and agrees it is regular. Patient states their abdomen is not protruberant beyond their normal size. States that there is no pain when touching the area beneath the sternum when directed, or the left or the right side of the abdomen. They feel no pain when asked to press on the suprapubic area or the right or left flank. No joint swelling  The feet/ achilles are currently stable. No observed neurological changes or slurred speech when speaking to the patient        Current labs  Lab Results   Component Value Date    IX7HLXI 440 12/17/2012    ZJ4FERI 683 06/08/2012    KX1CVQF 547 03/07/2012    HH8UASB 611 12/12/2011         Chemistry        Component Value Date/Time     03/31/2020 0748    K 4.5 03/31/2020 0748     03/31/2020 0748    CO2 22 03/31/2020 0748    BUN 16 03/31/2020 0748    CREATININE 1.13 03/31/2020 0748        Component Value Date/Time    CALCIUM 9.6 03/31/2020 0748    ALKPHOS 112 (H) 03/31/2020 0748    AST 21 03/31/2020 0748    ALT 20 03/31/2020 0748    BILITOT 0.3 03/31/2020 0748          No components found for: CHLPL  Lab Results   Component Value Date    TRIG 277 (H) 03/31/2020    TRIG 120 04/17/2019    TRIG 170 03/14/2018     Lab Results   Component Value Date    HDL 41 03/31/2020    HDL 44 04/17/2019    HDL 28 (L) 03/14/2018     Lab Results   Component Value Date    LDLCALC 112 03/31/2020    LDLCALC 119 04/17/2019    LDLCALC 62 03/14/2018     No results found for: LABVLDL  Lab Results   Component Value Date    HEPAIGM Non-reactive 04/24/2019    HEPBCAB NEGATIVE 12/12/2011     Lab Results   Component Value Date    RPR NON REAC 03/07/2012       Impression/Plan   HIV infection - Triumeq. Noncompliant. Cellulitis of R foot with chronic OM - MRSA on last culture. Lanced it himself.  States that it still opens and closes from time to time, currently closed per patient. He has yet to send a picture. Thyroid nodules - has had US guided biopsy, path results are pending. Suicide attempt history   Crack use  Basal Cell Carcinoma/squamous cell cancer  - follow up with derm as directed. Had EGD done - no stricture and no cancer. Genotype reviewed - some NNRTI resistance. Tobacco use  vitamin D deficiency hx. Check on this with next labs. Prophylaxis needed: Bactrim until his percent comes up, but he is not taking it. Prevention  Substance abuse screening performed:   Crack  Counseling provided during this visit > 50% yes  Brief Mental Health Screening performed:  Yes - suicide attempts  Physical abuse screening performed: yes - none    TELEPHONE ONLY CALL> REFUSES VIRTUAL VISIT TODAY. I have asked to schedule virtual visit for the next encounter. He states that he may be able to use his sister's phone. Patient has been informed of the importance of protection during sexual encounters which include, but are not limited to vaginal intercourse, anal intercourse, and oral sex. Patient has also been made aware that it is a felony in the [address] to engage in a sexual encounter without first disclosing HIV status to partner.     [address]

## 2020-09-15 ENCOUNTER — TELEPHONE (OUTPATIENT)
Dept: INFECTIOUS DISEASES | Age: 55
End: 2020-09-15

## 2020-09-15 NOTE — TELEPHONE ENCOUNTER
Pt called into office in need of help with transport for up coming apt. .   Rn and pt reviewed additional transport options and assistance. It is determined he will use [facility] as last report for help. Aware of policy and  time. [facility] notified. All appts are related to medical treatment and are necessary for compliance    Appt:   9.28.2020 with Dr Jakob Carl (ENT) at 8 am   [facility] [address] [place], [address] [phone]      Denies any needs at this time. Denies any issues that need Doctor attention.  Will call with concerns

## 2020-09-15 NOTE — TELEPHONE ENCOUNTER
Pt called in stating he needed to cancel transport. 9.17.2020 Pt has own transport. pt will call with other needed transport as last resort  Pt will call with updates or further issues. Call placed to aida. Transport canceled with in apprioate time frame.

## 2020-09-22 ENCOUNTER — TELEPHONE (OUTPATIENT)
Dept: INFECTIOUS DISEASES | Age: 55
End: 2020-09-22

## 2020-09-22 NOTE — TELEPHONE ENCOUNTER
Pt called into office in need of help with transport for up coming apt. .   Rn and pt reviewed additional transport options and assistance. It is determined he will use [facility] as last report for help. Aware of policy and  time. [facility] notified. All appts are related to medical treatment and are necessary for compliance      Appt:   Dr Veto Cage 9.23.2020 at 10 am    Reviewed compliance as well. States he has been taking medications. has follow up with pcp tomorrow he is concerns about thrush. Will call with update    Pt has follow up 9.30.2020- he will complete labs at [facility] as soon as he can. Denies need for help with transport. Denies any needs at this time. Denies any issues that need Doctor attention.  Will call with concerns

## 2020-09-28 ENCOUNTER — TELEPHONE (OUTPATIENT)
Dept: INFECTIOUS DISEASES | Age: 55
End: 2020-09-28

## 2020-09-28 NOTE — TELEPHONE ENCOUNTER
Per request, CM scheduled transport to appointment with Dr. Shayla Orr, Cardiologist, 10/1/20 @ 11:15AM.  CM assessed need, made arrangements as last resort.

## 2020-09-30 ENCOUNTER — VIRTUAL VISIT (OUTPATIENT)
Dept: INFECTIOUS DISEASES | Age: 55
End: 2020-09-30
Payer: MEDICARE

## 2020-09-30 PROCEDURE — 99443 PR PHYS/QHP TELEPHONE EVALUATION 21-30 MIN: CPT | Performed by: INTERNAL MEDICINE

## 2020-09-30 NOTE — PROGRESS NOTES
2020      Patient Name: Harman Maldonado  YOB: 1965  Patient Sex: male  Medical Record Number: [id number]    2020    TELEHEALTH EVALUATION -- Audio/Visual (During LXMWD-68 public health emergency)      Harman Maldonado (:  1965) has requested an audio/video evaluation for the following concern(s):    HIV    Due to the COVID-19 outbreak, patient's office visit was converted to a virtual visit. Patient was contacted and agreed to proceed with a virtual visit with me via Doxy. me with my location at the office. Patient reports that they are located at home. The risks and benefits of converting to a virtual visit were discussed in light of the current infectious disease epidemic. Services were provided through an audio/video synchronous discussion virtually to substitute for in person clinic visit. THIS virtual visit was conducted via interactive/real-time audio/video. Due to this being a TeleHealth encounter, evaluation of the following organ systems is limited: Vitals/Constitutional/EENT/Resp/CV/GI//MS/Neuro/Skin/Heme-Lymph-Imm.}    Pursuant to the emergency declaration under the [facility] waiver authority and the [employer] Act, this Virtual Visit was conducted, with patient's consent, to reduce the patient's risk of exposure to COVID-19 and provide care for the patient. Background:    Patient was diagnosed with HIV in  in [facility]. Had thrush at the time. HIs CD4 was noted to be 328 and genotype was negative for any resistance. Started on Combivir and Sustiva but quit taking meds a few years later. In  was started on Atripla but stopped 3-4 years later. Then started taking Triumeq about 3 years ago but stopped taking that as well within a few years. He is back on triumeq and compliant with medications.     Hx of crack use and tobacco. Has had over 100 sexual partners over his lifetime - males. Hx of multiple suicide attempts,He has been institutionalized a few times for depression and suicide attempts. No STD history, he is edentulous, has a family hx of colon cancer and skin cancers. He had neck and lip squamous and basal cell cancers removed. Father had stage 4 cancer renal cancer with mets and passed away. Mother passed away as well. Moved to [facility] temporarily after his father passed away and is back as of October 2018. Got a new PCP ( Dr Dasia Chmapion )  Saw Dr Osorio Araujo for podiatry and surgery was 1/21/2019 - wound went down to bone. Persistent issues with cellulitis of the ankle. He tends to pick at the wound and ashlee abscesses himself. + MRSA hx.   Had EGD on 1/16/2019 - no cancerous findings. Has had recurrent issues with the achilles No recent labs done - none since March 2020. Tells me that he went and turned around because he felt like it was too crowded. Supposed to be onTriumeq - still has not been taking it. He started to take it and then went back to not taking it. Apparently was recently diagnosed with thyroid nodules: CT chest which showed a 15mm nodule, followed by an ultrasound that showed 2 L sided nodules and 1 R sided nodule. Follows up with his PCP Dr Cahuncey Galindo   His biopsy was apparently not cancerous but he has a PET scan scheduled and also has \"elective surgery scheduled to remove the L part of the thyroid on November 12\" per patient. Need medical records. Apparently getting vitamin B injections      Review of Systems:  All 14 systems reviewed with patient and are negative other than as stated above.     Past Medical History  Past Medical History:   Diagnosis Date    Anxiety     Cancer ([address]     skin  L arm    Cellulitis of heel, right 2016    after surgery x 3 on tendion     Depression     Erectile dysfunction     Headache(784.0)     HIV (human immunodeficiency virus infection) ([address]     Hyperlipidemia     Liver disease     Osteoarthritis        Past Surgical History  Past Surgical History:   Procedure Laterality Date    CHOLECYSTECTOMY      CYST REMOVAL      R wrist x2    FOOT SURGERY      bone extraction    FRACTURE SURGERY      UPPER GASTROINTESTINAL ENDOSCOPY N/A 1/16/2019    EGD ESOPHAGOGASTRODUODENOSCOPY performed [facility]cd[facility]       Allergies  Allergies   Allergen Reactions    Methadone Other (See Comments)     Chest pain  Chest pain    Morphine Other (See Comments)     Chest pain  Chest pains    Nexium [Esomeprazole Magnesium] Nausea Only and Nausea And Vomiting     Upset stomach  Upset stomach    Omeprazole Nausea Only and Nausea And Vomiting     Upset stomach  Upset stomach       Family History  Family History   Problem Relation Age of Onset    High Blood Pressure Mother     Cancer Father        Immunization History  Immunization History   Administered Date(s) Administered    Hepatitis B 06/09/2005, 07/01/2005, 12/02/2005    Hepatitis B vaccine 06/09/2005, 07/01/2005, 12/02/2005    Influenza 10/19/2011    Influenza A ([id number]) Vaccine IM 11/25/2009    Influenza Vaccine, unspecified formulation 10/19/2011, 09/15/2014    Influenza Whole 01/26/2011    PPD Test 01/26/2011, 03/20/2012, 06/08/2015    Pneumococcal Conjugate 13-valent (Cdkczqn28) 04/24/2019    Pneumococcal Conjugate 7-valent (Prevnar7) 03/16/2009    Td vaccine (adult) 10/19/2006    Tdap (Boostrix, Adacel) 04/24/2019    Tetanus 10/19/2006     Since we cannot conduct an in-person exam, the following were addressed with the patient. I have asked the patient to assist in their exam:  Patient denies any general new issues. Patient does not observe any new skin rashes   R achilles as above. Patient does not perceive any new visual deficits  Patient does not feel like they are \"clammy\" or sweating  Patient denies any dry mouth or sore mouth and is able to flex and extend her neck with ease.    Patient can inhale and exhale without any difficulty and feels like their chest is expanding symmetrically. They do not feel short of breath or any distress when asked to move around. No pain with expansion of the chest  Patient is able to feel their own pulse and agrees it is regular. Patient states their abdomen is not protruberant beyond their normal size. States that there is no pain when touching the area beneath the sternum when directed, or the left or the right side of the abdomen. They feel no pain when asked to press on the suprapubic area or the right or left flank. No joint swelling  The feet/ achilles are currently stable. Unable to observe bc this is a telephone only visit. Current labs  Lab Results   Component Value Date    DY9QYZF 440 12/17/2012    LV9UJGB 683 06/08/2012    SO9AOXP 547 03/07/2012    ZZ0MHQR 611 12/12/2011         Chemistry        Component Value Date/Time     03/31/2020 0748    K 4.5 03/31/2020 0748     03/31/2020 0748    CO2 22 03/31/2020 0748    BUN 16 03/31/2020 0748    CREATININE 1.13 03/31/2020 0748        Component Value Date/Time    CALCIUM 9.6 03/31/2020 0748    ALKPHOS 112 (H) 03/31/2020 0748    AST 21 03/31/2020 0748    ALT 20 03/31/2020 0748    BILITOT 0.3 03/31/2020 0748          No components found for: CHLPL  Lab Results   Component Value Date    TRIG 277 (H) 03/31/2020    TRIG 120 04/17/2019    TRIG 170 03/14/2018     Lab Results   Component Value Date    HDL 41 03/31/2020    HDL 44 04/17/2019    HDL 28 (L) 03/14/2018     Lab Results   Component Value Date    LDLCALC 112 03/31/2020    LDLCALC 119 04/17/2019    LDLCALC 62 03/14/2018     No results found for: LABVLDL  Lab Results   Component Value Date    HEPAIGM Non-reactive 04/24/2019    HEPBCAB NEGATIVE 12/12/2011     Lab Results   Component Value Date    RPR NON REAC 03/07/2012       Impression/Plan   HIV infection - Triumeq. Noncompliant. Cellulitis of R foot with chronic OM - MRSA on last culture.  Has been lancing himself. He has yet to send a picture so I am unable to tell how the foot is doing. I have offered wound care re-referral as well. Thyroid nodules - surgery pending? PET pending? COntinue to follow up with PCP and I will need a copy of the MR  Suicide attempt history   Illicit drug use: Crack use  Basal Cell Carcinoma/squamous cell cancer  - follow up with derm as directed. Had EGD done - no stricture and no cancer. Genotype reviewed - some NNRTI resistance. Tobacco use  vitamin D deficiency hx. Check on this with next labs. Prophylaxis needed: Bactrim until his percent comes up, but he is not taking it. Needs to get the Flu shot this fall. Prevention  Substance abuse screening performed:   Crack  Counseling provided during this visit > 50% yes  Brief Mental Health Screening performed:  Yes - suicide attempts  Physical abuse screening performed: yes - none    TELEPHONE ONLY CALL> REFUSES VIRTUAL VISIT AGAIN.   >21 min time spent on this chart and case. Patient has been informed of the importance of protection during sexual encounters which include, but are not limited to vaginal intercourse, anal intercourse, and oral sex. Patient has also been made aware that it is a felony in the [facility] [address] to engage in a sexual encounter without first disclosing HIV status to partner.     [address]

## 2020-10-02 ENCOUNTER — TELEPHONE (OUTPATIENT)
Dept: INFECTIOUS DISEASES | Age: 55
End: 2020-10-02

## 2020-10-16 ENCOUNTER — TELEPHONE (OUTPATIENT)
Dept: INFECTIOUS DISEASES | Age: 55
End: 2020-10-16

## 2020-10-16 NOTE — TELEPHONE ENCOUNTER
Pt called in to notify office he restarted triumeq about 3 days ago. Tolerating well. Pt is aware of need for 100% compliance. This is reinforced by Dr Austin Villarreal as well. He will complete labs end of October as he has preadmission testing on 11.5.2020 for thyroid sx.      Will call with other updates

## 2020-10-19 ENCOUNTER — TELEPHONE (OUTPATIENT)
Dept: INFECTIOUS DISEASES | Age: 55
End: 2020-10-19

## 2020-10-19 NOTE — TELEPHONE ENCOUNTER
Pt called into office in need of help with transport for up coming apt. .   Rn and pt reviewed additional transport options and assistance. It is determined he will use [facility] as last report for help. Aware of policy and  time. [facility] notified. All appts are related to medical treatment and are necessary for compliance        Appt: Dr Isauro Carrion 11.9.2020 at 200 am [facility]    Denies any needs at this time. Denies any issues that need Doctor attention.  Will call with concerns

## 2020-10-19 NOTE — TELEPHONE ENCOUNTER
Pt called into office in need of help with transport for up coming apt. .   Rn and pt reviewed additional transport options and assistance. It is determined he will use [facility] as last report for help. Aware of policy and  time. [facility] notified. All appts are related to medical treatment and are necessary for compliance    Appt:  Dr Gordon Buck 11.10.2020 at 10 am    Denies any needs at this time. Denies any issues that need Doctor attention.  Will call with concerns

## 2020-10-29 ENCOUNTER — TELEPHONE (OUTPATIENT)
Dept: INFECTIOUS DISEASES | Age: 55
End: 2020-10-29

## 2020-10-29 NOTE — TELEPHONE ENCOUNTER
Call placed to pt regarding need to update RW eligibility and paperwork. Reviewed all needed documentation for update. This includes proof of income ( 1 month total, most recent) proof of residency and ID.   pt is aware of the program, verbalized understanding and denies questions.      FIORELLA apt made 11.16.2020 over phone    Will call office with other needs or concerns

## 2020-10-30 DIAGNOSIS — B20 HIV INFECTION, UNSPECIFIED SYMPTOM STATUS (HCC): ICD-10-CM

## 2020-10-30 DIAGNOSIS — E55.9 VITAMIN D DEFICIENCY: ICD-10-CM

## 2020-10-30 LAB
ALBUMIN SERPL-MCNC: 4 G/DL (ref 3.5–4.6)
ALP BLD-CCNC: 113 U/L (ref 35–104)
ALT SERPL-CCNC: 23 U/L (ref 0–41)
ANION GAP SERPL CALCULATED.3IONS-SCNC: 14 MEQ/L (ref 9–15)
AST SERPL-CCNC: 18 U/L (ref 0–40)
BILIRUB SERPL-MCNC: 0.4 MG/DL (ref 0.2–0.7)
BUN BLDV-MCNC: 16 MG/DL (ref 6–20)
CALCIUM SERPL-MCNC: 9.1 MG/DL (ref 8.5–9.9)
CHLORIDE BLD-SCNC: 105 MEQ/L (ref 95–107)
CO2: 21 MEQ/L (ref 20–31)
CREAT SERPL-MCNC: 1.25 MG/DL (ref 0.7–1.2)
GFR AFRICAN AMERICAN: >60
GFR NON-AFRICAN AMERICAN: >60
GLOBULIN: 3.1 G/DL (ref 2.3–3.5)
GLUCOSE BLD-MCNC: 129 MG/DL (ref 70–99)
HCT VFR BLD CALC: 52.1 % (ref 42–52)
HEMOGLOBIN: 17.2 G/DL (ref 14–18)
MCH RBC QN AUTO: 31.5 PG (ref 27–31.3)
MCHC RBC AUTO-ENTMCNC: 33.1 % (ref 33–37)
MCV RBC AUTO: 95.1 FL (ref 80–100)
PDW BLD-RTO: 15.2 % (ref 11.5–14.5)
PLATELET # BLD: 178 K/UL (ref 130–400)
POTASSIUM SERPL-SCNC: 4 MEQ/L (ref 3.4–4.9)
RBC # BLD: 5.47 M/UL (ref 4.7–6.1)
SODIUM BLD-SCNC: 140 MEQ/L (ref 135–144)
TOTAL PROTEIN: 7.1 G/DL (ref 6.3–8)
VITAMIN D 25-HYDROXY: 26.7 NG/ML (ref 30–100)
WBC # BLD: 8.5 K/UL (ref 4.8–10.8)

## 2020-11-01 LAB
ABSOLUTE CD 4 HELPER: 441 CELLS/UL (ref 430–1800)
CD4 % HELPER T CELL: 27 % (ref 32–64)
LYMPHOCYTE SUBSET PANEL 2 INFO: ABNORMAL

## 2020-11-02 ENCOUNTER — TELEPHONE (OUTPATIENT)
Dept: INFECTIOUS DISEASES | Age: 55
End: 2020-11-02

## 2020-11-02 LAB
QUANTI TB GOLD PLUS: NEGATIVE
QUANTI TB1 MINUS NIL: 0 IU/ML (ref 0–0.34)
QUANTI TB2 MINUS NIL: 0 IU/ML (ref 0–0.34)
QUANTIFERON MITOGEN: >10 IU/ML
QUANTIFERON NIL: 0.06 IU/ML

## 2020-11-02 NOTE — TELEPHONE ENCOUNTER
Pt called in requesting to review recent lab work. Reviewed resulted labs. Pt denied questions or concerns. Taking Triumeq everyday now. Discussed importance of DAILY compliance. \" I will try. Its better when I take it in the AM. I forget more when I take it at night. I get busy with other stuff and forget\"     Denies any needs at this time. Denies any issues that need Doctor attention.  Will call with concerns

## 2020-11-04 ENCOUNTER — TELEPHONE (OUTPATIENT)
Dept: INFECTIOUS DISEASES | Age: 55
End: 2020-11-04

## 2020-11-04 NOTE — TELEPHONE ENCOUNTER
Pt called into office in need of help with transport for up coming apt. .   Rn and pt reviewed additional transport options and assistance. It is determined he will use [facility] as last report for help. Aware of policy and  time. [facility] notified. All appts are related to medical treatment and are necessary for compliance    Appt:   11.10.2020 [facility] at [address], [facility], [address]      Denies any needs at this time. Denies any issues that need Doctor attention.  Will call with concerns

## 2020-11-05 LAB
HIV-1 QNT LOG, IU/ML: <1.47 LOG CPY/ML
HIV-1 QNT, IU/ML: ABNORMAL CPY/ML
INTERPRETATION: DETECTED

## 2020-11-09 ENCOUNTER — TELEPHONE (OUTPATIENT)
Dept: INFECTIOUS DISEASES | Age: 55
End: 2020-11-09

## 2020-11-11 ENCOUNTER — TELEPHONE (OUTPATIENT)
Dept: INFECTIOUS DISEASES | Age: 55
End: 2020-11-11

## 2020-11-11 NOTE — TELEPHONE ENCOUNTER
Pt called into office in need of help with transport for up coming apt. .   Rn and pt reviewed additional transport options and assistance. It is determined he will use [facility] as last report for help. Aware of policy and  time. [facility] notified. All appts are related to medical treatment and are necessary for compliance      Appt:   11.12.2020 to [facility] at 12 (DROP OFF ONLY)     Denies any needs at this time. Denies any issues that need Doctor attention.  Will call with concerns

## 2020-11-16 ENCOUNTER — NURSE ONLY (OUTPATIENT)
Dept: INFECTIOUS DISEASES | Age: 55
End: 2020-11-16

## 2020-11-16 NOTE — PROGRESS NOTES
Re-Assessment      Patient ID:   Sharmaine Polk  Date:   11/16/2020      Client ID:  [id number]    [address] [facility] [address]  [facility] [phone] (home)     Family/House:    [] Partner  [] Children  [] Other -     Mental Health:   Hx of MH    Substance Abuse:   Hx of SA  Social History     Socioeconomic History    Marital status: Single     Spouse name: Not on file    Number of children: Not on file    Years of education: Not on file    Highest education level: Not on file   Occupational History    Not on file   Social Needs    Financial resource strain: Not on file    Food insecurity     Worry: Not on file     Inability: Not on file    Transportation needs     Medical: Not on file     Non-medical: Not on file   Tobacco Use    Smoking status: Current Every Day Smoker     Packs/day: 2.00     Years: 44.00     Pack years: 88.00     Types: Cigarettes    Smokeless tobacco: Never Used    Tobacco comment: 3-4 PPD   Substance and Sexual Activity    Alcohol use: No     Alcohol/week: 0.0 standard drinks    Drug use: Yes     Types: Cocaine, Marijuana     Comment: crack     Sexual activity: Not on file   Lifestyle    Physical activity     Days per week: Not on file     Minutes per session: Not on file    Stress: Not on file   Relationships    Social connections     Talks on phone: Not on file     Gets together: Not on file     Attends Sabianism service: Not on file     Active member of club or organization: Not on file     Attends meetings of clubs or organizations: Not on file     Relationship status: Not on file    Intimate partner violence     Fear of current or ex partner: Not on file     Emotionally abused: Not on file     Physically abused: Not on file     Forced sexual activity: Not on file   Other Topics Concern    Not on file   Social History Narrative    Not on file       Housing:   with family    Health/Healthcare:  Hx of non-compliance; multiple medical issues   Physician Visit: Dr. Martini Forward 9/20   Dental Visit:  Eileen Meade encouraged follow up    CD4:   Lab Results   Component Value Date    LABABSO 441 10/30/2020       Viral Load:  Lab Results   Component Value Date    ZLG4IEJWXNE 17,000 10/17/2018    JEE8UAOMJU <1.47 10/30/2020       Medical Insurance:  Payor: MEDICARE / Plan: MEDICARE PART A AND B / Product Type: *No Product type* /    OHDAP/HIPSCA:  OHDAP   Renewal Date:  1/2021    Income:    SSDI    Legal Issues:    NA    Emergency Contact:   Extended Emergency Contact Information  Primary Emergency Contact: Hosea Sinclair [address] Phone: [phone]  Mobile Phone: [phone]  Relation: Brother/Sister  Appointment completed via phone per Covid-19 Guidelines. CM called pt for scheduled appointment. CM completed Annual Review. CM updated RW Part A Eligibility for services. Pt has Medicare A, B, D.  Pt receives SSDI. CM reviewed Grievance Policy, Sliding Scale Fee and Transportation Policy with pt. CM completed PSA and SA/MH Assessments. Pt has hx of SA and MH, refuses referrals for counseling. CM updated ISP, pt agreed with POC. Pt has hx of non-compliance, often takes \"drug holidays\". Last VL from 11/5/20 is <30. Pt las saw dentist at [facility] at the beginning of the year. CM encouraged follow up. Pt is not sexually active, is aware of U=U, undetectable equals un-transmittable. CM provided socialization, no specific issues discussed. CM will follow up with pt as needed.

## 2020-11-17 ENCOUNTER — TELEPHONE (OUTPATIENT)
Dept: INFECTIOUS DISEASES | Age: 55
End: 2020-11-17

## 2020-11-17 NOTE — TELEPHONE ENCOUNTER
Pt called into office in need of help with transport for up coming apt. .   Rn and pt reviewed additional transport options and assistance. It is determined he will use pegasus as last report for help. Aware of policy and  time. Pegasus notified. All appts are related to medical treatment and are necessary for compliance        Appt: ENT Friday Nov. 20th  at 12:00pm (noon) [address] Abram Jimenez     Denies any needs at this time. Denies any issues that need Doctor attention.  Will call with concerns

## 2020-11-17 NOTE — TELEPHONE ENCOUNTER
Pt called into clinic equesting help with basic needs. Requesting food/gas voucher  Needs assessed. 2 food vouchers given to pt. Denies other needs at this time and will call with concerns and/or updates      Denies any needs at this time. Denies any issues that need Doctor attention.  Will call with concerns

## 2020-11-20 ENCOUNTER — TELEPHONE (OUTPATIENT)
Dept: INFECTIOUS DISEASES | Age: 55
End: 2020-11-20

## 2020-11-20 ENCOUNTER — NURSE ONLY (OUTPATIENT)
Dept: INFECTIOUS DISEASES | Age: 55
End: 2020-11-20

## 2020-11-20 DIAGNOSIS — L97.412 NEUROPATHIC ULCER OF RIGHT HEEL WITH FAT LAYER EXPOSED (HCC): ICD-10-CM

## 2020-11-20 NOTE — PROGRESS NOTES
Pt into clinic for schedule apt for wound culture. Culture of Right heal obtained. Small amount of yellow/greenish drainage noted. Noted that wound wound is tunneling. Pain states pain is tolerable right now. Will call podiatry today  requesting help with basic needs. Requesting food/gas voucher  Needs assessed. 1 gas card given to pt. Denies other needs at this time and will call with concerns and/or updates  Provided face mask cloth x2 and surgical x 10, hand  and cleaning wipes, RW Part A Provision, to reduce the spread of Covid-19. Denies any needs at this time. Denies any issues that need Doctor attention.  Will call with concerns

## 2020-11-20 NOTE — TELEPHONE ENCOUNTER
Pt called in with concerns regarding foot wound. This is chronic condition. Pt states \"it stared draining a lot so I lanced it and it exploded. No puss just blood. \" denies fevers/chills. States he is taking Bactrim now. Spoke with Dr Doroteo Cameron- pt need wound culture and to STOP lancing wound. VM left for pt to call office as soon as he can to discuss.

## 2020-11-23 LAB
GRAM STAIN RESULT: ABNORMAL
ORGANISM: ABNORMAL
ORGANISM: ABNORMAL
WOUND/ABSCESS: ABNORMAL
WOUND/ABSCESS: ABNORMAL

## 2020-11-25 ENCOUNTER — TELEPHONE (OUTPATIENT)
Dept: INFECTIOUS DISEASES | Age: 55
End: 2020-11-25

## 2020-11-25 NOTE — TELEPHONE ENCOUNTER
Pt called into office in need of help with transport for up coming apt. .   Rn and pt reviewed additional transport options and assistance. It is determined he will use pegasus as last report for help. Aware of policy and  time. Pegasus notified. All appts are related to medical treatment and are necessary for compliance    Appt:   Dr Tevin De La Cruz [phone] at [address], [address] [facility]      Denies any needs at this time. Denies any issues that need Doctor attention.  Will call with concerns

## 2020-12-07 ENCOUNTER — TELEPHONE (OUTPATIENT)
Dept: INFECTIOUS DISEASES | Age: 55
End: 2020-12-07

## 2020-12-07 NOTE — TELEPHONE ENCOUNTER
Pt called into office in need of help with transport for up coming apt. . States he has already scheduled transport for this appointment. Noted time change per pt. Apt is NOT at 1130 it is now at 1  Rn and pt reviewed additional transport options and assistance. It is determined he will use pegasus as last report for help. Aware of policy and  time. Pegasus notified. All appts are related to medical treatment and are necessary for compliance      Appt:   Dr Sirena Nelson 12.9.2020 (TIME CHANGE) at 1    Denies any needs at this time. Denies any issues that need Doctor attention.  Will call with concerns

## 2020-12-08 RX ORDER — ALBUTEROL SULFATE 90 UG/1
AEROSOL, METERED RESPIRATORY (INHALATION)
Qty: 1 INHALER | Refills: 2 | Status: SHIPPED | OUTPATIENT
Start: 2020-12-08 | End: 2021-06-30

## 2021-01-04 ENCOUNTER — TELEPHONE (OUTPATIENT)
Dept: INFECTIOUS DISEASES | Age: 56
End: 2021-01-04

## 2021-01-04 DIAGNOSIS — F14.90 CRACK COCAINE USE: ICD-10-CM

## 2021-01-04 DIAGNOSIS — B20 SYMPTOMATIC HIV INFECTION (HCC): Primary | ICD-10-CM

## 2021-01-04 DIAGNOSIS — F19.10 DRUG ABUSE (HCC): ICD-10-CM

## 2021-01-04 NOTE — TELEPHONE ENCOUNTER
Pt called into office in need of help with transport for up coming apt. .   Rn and pt reviewed additional transport options and assistance. It is determined he will use pegasus as last report for help. Aware of policy and  time. Pegasus notified. All appts are related to medical treatment and are necessary for compliance      Appt: [facility] out pt lab 1.5.2021 at 1130     Denies any needs at this time. Denies any issues that need Doctor attention.  Will call with concerns

## 2021-01-05 DIAGNOSIS — F14.90 CRACK COCAINE USE: ICD-10-CM

## 2021-01-05 DIAGNOSIS — B20 SYMPTOMATIC HIV INFECTION (HCC): ICD-10-CM

## 2021-01-05 DIAGNOSIS — F19.10 DRUG ABUSE (HCC): ICD-10-CM

## 2021-01-05 LAB
ALBUMIN SERPL-MCNC: 4.1 G/DL (ref 3.5–4.6)
ALP BLD-CCNC: 104 U/L (ref 35–104)
ALT SERPL-CCNC: 19 U/L (ref 0–41)
ANION GAP SERPL CALCULATED.3IONS-SCNC: 14 MEQ/L (ref 9–15)
AST SERPL-CCNC: 16 U/L (ref 0–40)
BILIRUB SERPL-MCNC: <0.2 MG/DL (ref 0.2–0.7)
BUN BLDV-MCNC: 12 MG/DL (ref 6–20)
CALCIUM SERPL-MCNC: 9.5 MG/DL (ref 8.5–9.9)
CHLORIDE BLD-SCNC: 105 MEQ/L (ref 95–107)
CO2: 22 MEQ/L (ref 20–31)
CREAT SERPL-MCNC: 1.18 MG/DL (ref 0.7–1.2)
GFR AFRICAN AMERICAN: >60
GFR NON-AFRICAN AMERICAN: >60
GLOBULIN: 3.4 G/DL (ref 2.3–3.5)
GLUCOSE BLD-MCNC: 95 MG/DL (ref 70–99)
HCT VFR BLD CALC: 52.2 % (ref 42–52)
HEMOGLOBIN: 17.5 G/DL (ref 14–18)
MCH RBC QN AUTO: 32.7 PG (ref 27–31.3)
MCHC RBC AUTO-ENTMCNC: 33.5 % (ref 33–37)
MCV RBC AUTO: 97.7 FL (ref 80–100)
PDW BLD-RTO: 14.7 % (ref 11.5–14.5)
PLATELET # BLD: 211 K/UL (ref 130–400)
POTASSIUM SERPL-SCNC: 4.5 MEQ/L (ref 3.4–4.9)
RBC # BLD: 5.34 M/UL (ref 4.7–6.1)
SODIUM BLD-SCNC: 141 MEQ/L (ref 135–144)
TOTAL PROTEIN: 7.5 G/DL (ref 6.3–8)
WBC # BLD: 7.3 K/UL (ref 4.8–10.8)

## 2021-01-06 LAB
ABSOLUTE CD 4 HELPER: 448 CELLS/UL (ref 430–1800)
CD4 % HELPER T CELL: 22 % (ref 32–64)
LYMPHOCYTE SUBSET PANEL 2 INFO: ABNORMAL

## 2021-01-08 ENCOUNTER — TELEPHONE (OUTPATIENT)
Dept: INFECTIOUS DISEASES | Age: 56
End: 2021-01-08

## 2021-01-08 LAB
AMPHETAMINE SCREEN, URINE: NEGATIVE NG/ML
BARBITURATE SCREEN URINE: NEGATIVE NG/ML
BENZODIAZEPINE SCREEN, URINE: NEGATIVE NG/ML
CANNABINOID SCREEN URINE: NEGATIVE NG/ML
COCAINE METABOLITE SCREEN URINE: POSITIVE NG/ML
CREATININE URINE: 213.3 MG/DL (ref 20–400)
Lab: NORMAL
MDMA URINE: NEGATIVE NG/ML
OPIATE SCREEN URINE: NEGATIVE NG/ML
OXYCODONE SCREEN URINE: NEGATIVE NG/ML
PHENCYCLIDINE SCREEN URINE: NEGATIVE NG/ML

## 2021-01-14 ENCOUNTER — TELEPHONE (OUTPATIENT)
Dept: INFECTIOUS DISEASES | Age: 56
End: 2021-01-14

## 2021-01-14 NOTE — TELEPHONE ENCOUNTER
Pt called into office in need of help with transport for up coming apt. .   Rn and pt reviewed additional transport options and assistance. It is determined he will use pegasus as last report for help. Aware of policy and  time. Pegasus notified. All appts are related to medical treatment and are necessary for compliance      Appt: Dr Shirley Mccoy 1.15.21  at [phone]    Denies any needs at this time. Denies any issues that need Doctor attention.  Will call with concerns

## 2021-01-19 ENCOUNTER — VIRTUAL VISIT (OUTPATIENT)
Dept: INFECTIOUS DISEASES | Age: 56
End: 2021-01-19
Payer: MEDICARE

## 2021-01-19 ENCOUNTER — TELEPHONE (OUTPATIENT)
Dept: INFECTIOUS DISEASES | Age: 56
End: 2021-01-19

## 2021-01-19 ENCOUNTER — NURSE ONLY (OUTPATIENT)
Dept: INFECTIOUS DISEASES | Age: 56
End: 2021-01-19

## 2021-01-19 DIAGNOSIS — T14.8XXA NONHEALING NONSURGICAL WOUND: ICD-10-CM

## 2021-01-19 DIAGNOSIS — B20 HIV INFECTION, UNSPECIFIED SYMPTOM STATUS (HCC): ICD-10-CM

## 2021-01-19 DIAGNOSIS — Z72.0 TOBACCO ABUSE: ICD-10-CM

## 2021-01-19 DIAGNOSIS — L03.115 CELLULITIS OF RIGHT FOOT: ICD-10-CM

## 2021-01-19 DIAGNOSIS — E55.9 VITAMIN D DEFICIENCY: Primary | ICD-10-CM

## 2021-01-19 DIAGNOSIS — F14.90 CRACK COCAINE USE: ICD-10-CM

## 2021-01-19 DIAGNOSIS — Z86.14 HISTORY OF MRSA INFECTION: ICD-10-CM

## 2021-01-19 PROCEDURE — G8417 CALC BMI ABV UP PARAM F/U: HCPCS | Performed by: INTERNAL MEDICINE

## 2021-01-19 PROCEDURE — 99214 OFFICE O/P EST MOD 30 MIN: CPT | Performed by: INTERNAL MEDICINE

## 2021-01-19 PROCEDURE — 4004F PT TOBACCO SCREEN RCVD TLK: CPT | Performed by: INTERNAL MEDICINE

## 2021-01-19 PROCEDURE — G8484 FLU IMMUNIZE NO ADMIN: HCPCS | Performed by: INTERNAL MEDICINE

## 2021-01-19 PROCEDURE — G8428 CUR MEDS NOT DOCUMENT: HCPCS | Performed by: INTERNAL MEDICINE

## 2021-01-19 PROCEDURE — 3017F COLORECTAL CA SCREEN DOC REV: CPT | Performed by: INTERNAL MEDICINE

## 2021-01-19 RX ORDER — SULFAMETHOXAZOLE AND TRIMETHOPRIM 800; 160 MG/1; MG/1
1 TABLET ORAL DAILY
Qty: 30 TABLET | Refills: 2 | Status: SHIPPED | OUTPATIENT
Start: 2021-01-19 | End: 2021-02-18

## 2021-01-19 NOTE — PROGRESS NOTES
2021      Patient Name: Abran Stacy  YOB: 1965  Patient Sex: male  Medical Record Number: [id number]    2021    TELEHEALTH EVALUATION -- Audio/Visual (During NVLOS-71 public health emergency)      Abran Stacy (:  1965) has requested an audio/video evaluation for the following concern(s):    HIV    Due to the COVID-19 outbreak, patient's office visit was converted to a virtual visit. Patient was contacted and agreed to proceed with a virtual visit with me via Doxy. me with my location at the office. Patient reports that they are located at home. The risks and benefits of converting to a virtual visit were discussed in light of the current infectious disease epidemic. Services were provided through an audio/video synchronous discussion virtually to substitute for in person clinic visit. THIS virtual visit was conducted via interactive/real-time audio/video. Due to this being a TeleHealth encounter, evaluation of the following organ systems is limited: Vitals/Constitutional/EENT/Resp/CV/GI//MS/Neuro/Skin/Heme-Lymph-Imm.}    Pursuant to the emergency declaration under the [facility], [facility] waiver authority and the [employer] Act, this Virtual Visit was conducted, with patient's consent, to reduce the patient's risk of exposure to COVID-19 and provide care for the patient. Background:    Patient was diagnosed with HIV in  in [facility]. Had thrush at the time. HIs CD4 was noted to be 328 and genotype was negative for any resistance. Started on Combivir and Sustiva but quit taking meds a few years later. In  was started on Atripla but stopped 3-4 years later. Then started taking Triumeq about 3 years ago but stopped taking that as well within a few years. He is back on triumeq and compliant with medications.     Hx of crack use and tobacco. Has had over 100 sexual partners over his lifetime - males. Hx of multiple suicide attempts,He has been institutionalized a few times for depression and suicide attempts. No STD history, he is edentulous, has a family hx of colon cancer and skin cancers. He had neck and lip squamous and basal cell cancers removed. Father had stage 4 cancer renal cancer with mets and passed away. Mother passed away as well. Moved to [facility] temporarily after his father passed away and is back as of October 2018. Got a new PCP ( Dr Simran Gilbert )  Saw Dr Sami Cannon for podiatry and surgery was 1/21/2019 - wound went down to bone. Persistent issues with cellulitis of the ankle. He tends to pick at the wound and ashlee abscesses himself. + MRSA hx.   Had EGD on 1/16/2019 - no cancerous findings. Has had recurrent issues with the achilles No recent labs done - none since March 2020. Tells me that he went and turned around because he felt like it was too crowded. He is on Triumeq. CD4 448/ 22%  Undetectable viral load  Cr 1.18 and LFTs ok      Last wound culture of the foot: GPC, Beta group C strep, and CONS, on Bactrim. Asked to avoid self inflicted injury to the area. He states that he debrided his own foot two days ago - tunnels one inch. Minor serosanguinous drainage. Has been following with his PCP    Review of Systems:  All 14 systems reviewed with patient and are negative other than as stated above.     Past Medical History  Past Medical History:   Diagnosis Date    Anxiety     Cancer ([facility] [address]     skin  L arm    Cellulitis of heel, right 2016    after surgery x 3 on tendion     Depression     Erectile dysfunction     Headache(784.0)     HIV (human immunodeficiency virus infection) ([facility] [address]     Hyperlipidemia     Liver disease     Osteoarthritis        Past Surgical History  Past Surgical History:   Procedure Laterality Date    CHOLECYSTECTOMY      CYST REMOVAL      R wrist x2    FOOT SURGERY      bone extraction    FRACTURE SURGERY      UPPER GASTROINTESTINAL ENDOSCOPY N/A 1/16/2019    EGD ESOPHAGOGASTRODUODENOSCOPY performed by Maury Maki MD at [address] [place] [address]       Allergies  Allergies   Allergen Reactions    Methadone Other (See Comments)     Chest pain  Chest pain    Morphine Other (See Comments)     Chest pain  Chest pains    Nexium [Esomeprazole Magnesium] Nausea Only and Nausea And Vomiting     Upset stomach  Upset stomach    Omeprazole Nausea Only and Nausea And Vomiting     Upset stomach  Upset stomach       Family History  Family History   Problem Relation Age of Onset    High Blood Pressure Mother     Cancer Father        Immunization History  Immunization History   Administered Date(s) Administered    Hepatitis B 06/09/2005, 07/01/2005, 12/02/2005    Hepatitis B vaccine 06/09/2005, 07/01/2005, 12/02/2005    Influenza 10/19/2011    Influenza A (Y3G5-33) Vaccine IM 11/25/2009    Influenza Vaccine, unspecified formulation 10/19/2011, 09/15/2014    Influenza Whole 01/26/2011    PPD Test 01/26/2011, 03/20/2012, 06/08/2015    Pneumococcal Conjugate 13-valent (Evfmkle98) 04/24/2019    Pneumococcal Conjugate 7-valent (Prevnar7) 03/16/2009    Td vaccine (adult) 10/19/2006    Tdap (Boostrix, Adacel) 04/24/2019    Tetanus 10/19/2006   Since we cannot conduct an in-person exam, the following were addressed with the patient to the best of my capability via virtual visit:   Patient does not perceive any new visual deficits  No diaphoresis or flushing in the face  Patient is able to flex and extend her neck with ease. Patient can inhale and exhale without any difficulty and chest seems to be expanding symmetrically. No conversational dyspnea. Patient does not feel any palpitations   Patient's  abdomen is not protruberant beyond their normal size. No new swelling of their joints.   No observed neurological changes or slurred speech when speaking to the patient    No new skin rash or ulcers  The R foot has a wound, serosanguinous drainage. No surrounding cellulitis but there is a wound. Smoking on the camera - discouraged from doing this      Current labs  Lab Results   Component Value Date    DA3SWEV 440 12/17/2012    NW9PDGY 683 06/08/2012    PL2OZDY 547 03/07/2012    HG5KCWE 611 12/12/2011         Chemistry        Component Value Date/Time     01/05/2021 1107    K 4.5 01/05/2021 1107     01/05/2021 1107    CO2 22 01/05/2021 1107    BUN 12 01/05/2021 1107    CREATININE 1.18 01/05/2021 1107        Component Value Date/Time    CALCIUM 9.5 01/05/2021 1107    ALKPHOS 104 01/05/2021 1107    AST 16 01/05/2021 1107    ALT 19 01/05/2021 1107    BILITOT <0.2 01/05/2021 1107          No components found for: CHLPL  Lab Results   Component Value Date    TRIG 277 (H) 03/31/2020    TRIG 120 04/17/2019    TRIG 170 03/14/2018     Lab Results   Component Value Date    HDL 41 03/31/2020    HDL 44 04/17/2019    HDL 28 (L) 03/14/2018     Lab Results   Component Value Date    LDLCALC 112 03/31/2020    LDLCALC 119 04/17/2019    LDLCALC 62 03/14/2018     No results found for: LABVLDL  Lab Results   Component Value Date    HEPAIGM Non-reactive 04/24/2019    HEPBCAB NEGATIVE 12/12/2011     Lab Results   Component Value Date    RPR NON REAC 03/07/2012       Impression/Plan   HIV infection - Triumeq. Cellulitis of R foot with chronic OM - MRSA on last culture. Has been lancing himself. He would like to see Dr Zoe Car in [place]. Vitamin B 12 and folate deficiency - encouraged to purchase folic acid OTC and to call PCP for Vitamin B12 infusions  Suicide attempt history   Illicit drug use: Crack use - continuous  Basal Cell Carcinoma/squamous cell cancer  - follow up with derm as directed. Had EGD done - no stricture and no cancer. Genotype reviewed - some NNRTI resistance. Tobacco use - asked to stop smoking on camera and to stop smoking  vitamin D deficiency hx. Check on this with next labs.      Prophylaxis needed: Bactrim for his foot. Needs to get the Flu shot this fall. Prevention  Substance abuse screening performed:   Crack  Counseling provided during this visit > 50% yes  Brief Mental Health Screening performed:  Yes - suicide attempts  Physical abuse screening performed: yes - none    Patient was counseled on the importance of proper handwashing, especially after handling wounds, surfaces, urine, and stool. If any diarrhea develops, patient is to refrain from the use of anti-diarrhea medications and is to call me immediately. Patient should refrain from touching their face. Questions regarding the current COVID outbreak addressed as needed.  Total time spent reviewing the contents of this chart and with patient in total > 25 min        Gwen Benavides

## 2021-01-19 NOTE — TELEPHONE ENCOUNTER
Call placed to pt as V/V follow up. .     Reviewed apt with Dr. Bary Claude. States the apt went well. With out issues    New orders reviewed-- continue bactrim    Follow up with Dr Marla Avilez for foot  Address: [address], [place], [address]   Phone: [phone]    Discussed compliance --- yes  No issues with medications-- states taking the triumeq    Pt has no questions regarding apt. 3 month follow up able to be scheduled. Discussed the need for lab work to be completed 1 week prior to next apt. Denies any other needs or acute issues that need doctor attention.

## 2021-01-19 NOTE — PROGRESS NOTES
CM assisted with [facility] Application for assistance with medication co-pays. CM will process application and submit to [address].

## 2021-01-25 ENCOUNTER — TELEPHONE (OUTPATIENT)
Dept: INFECTIOUS DISEASES | Age: 56
End: 2021-01-25
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
## 2021-01-27 ENCOUNTER — TELEPHONE (OUTPATIENT)
Dept: INFECTIOUS DISEASES | Age: 56
End: 2021-01-27

## 2021-01-27 NOTE — TELEPHONE ENCOUNTER
Pt called into office in need of help with transport for up coming appointment on 2/3/2021 . Nurse and pt reviewed additional transport options and assistance. It is determined he will use pegasus as last report for help. Aware of policy and  time. Pegasus notified. All appts are related to medical treatment and are necessary for compliance      Discussed with pt the possibility of changes due to COVID. Pt verbalized understanding. Appt: 2/3/21 @ 3:30pm with Dr. Dorota Scott, podiatry   [facility] [address] [place] [address]. [facility] [phone]    Denies any needs at this time. Denies any issues that need Doctor attention.  Will call with concerns

## 2021-01-28 ENCOUNTER — TELEPHONE (OUTPATIENT)
Dept: INFECTIOUS DISEASES | Age: 56
End: 2021-01-28

## 2021-01-28 NOTE — TELEPHONE ENCOUNTER
CM received call from Yudelka Pressley at [facility] ([facility]). Pt's [employer] application was approved through 8/31/21. Pt informed of same.

## 2021-02-10 ENCOUNTER — TELEPHONE (OUTPATIENT)
Dept: INFECTIOUS DISEASES | Age: 56
End: 2021-02-10

## 2021-02-10 NOTE — TELEPHONE ENCOUNTER
Per request, Cm scheduled [facility] @ 9:15AM.  CM assessed need, made arrangements as last resort.

## 2021-02-10 NOTE — TELEPHONE ENCOUNTER
Per request, CM scheduled transport to appointment with Podiatrist 2/11/21 @ 8:30AM.  Cm assessed need, made arrangements as last resort.

## 2021-02-11 ENCOUNTER — TELEPHONE (OUTPATIENT)
Dept: INFECTIOUS DISEASES | Age: 56
End: 2021-02-11

## 2021-02-11 NOTE — TELEPHONE ENCOUNTER
Per request, CM scheduled transport for follow up appointment with Sammi Preciado 19, 2/17/21 @ 9:15AM.  CM assessed need, made arrangements as last resort.

## 2021-02-17 ENCOUNTER — TELEPHONE (OUTPATIENT)
Dept: INFECTIOUS DISEASES | Age: 56
End: 2021-02-17

## 2021-02-17 NOTE — TELEPHONE ENCOUNTER
Pt called into office in need of help with transport for up coming apt. .   Rn and pt reviewed additional transport options and assistance. It is determined he will use pegasus as last report for help. Aware of policy and  time. Pegasus notified. All appts are related to medical treatment and are necessary for compliance      Appt: pt has scheduled apt today at 945-- pt will go to Dr Jeanette Garcia office after his 1st apt for lab work. Denies any needs at this time. Denies any issues that need Doctor attention.  Will call with concerns

## 2021-02-17 NOTE — TELEPHONE ENCOUNTER
Per request, CM cancelled transport for 2/18/21 for appointment with Dr. Rosemary Stephenson. Appointment was rescheduled for 4/21/21 @ 8:20AM.  CM will also reschedule transport for appointment. Cm assessed need, made arrangements as last resort.

## 2021-02-18 ENCOUNTER — TELEPHONE (OUTPATIENT)
Dept: INFECTIOUS DISEASES | Age: 56
End: 2021-02-18

## 2021-02-18 NOTE — TELEPHONE ENCOUNTER
Pt called into office in need of help with transport for up coming apt. .   Rn and pt reviewed additional transport options and assistance. It is determined he will use pegasus as last report for help. Aware of policy and  time. Pegasus notified. All appts are related to medical treatment and are necessary for compliance    Appt: 3.1.2021 at 130 with Dr Caity Sheffield     Denies any needs at this time. Denies any issues that need Doctor attention.  Will call with concerns

## 2021-02-22 ENCOUNTER — TELEPHONE (OUTPATIENT)
Dept: INFECTIOUS DISEASES | Age: 56
End: 2021-02-22
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
## 2021-03-01 ENCOUNTER — TELEPHONE (OUTPATIENT)
Dept: INFECTIOUS DISEASES | Age: 56
End: 2021-03-01

## 2021-03-01 NOTE — TELEPHONE ENCOUNTER
Pt called into office in need of help with transport for up coming apt. .   Rn and pt reviewed additional transport options and assistance. It is determined he will use pegasus as last report for help. Aware of policy and  time. Pegasus notified. All appts are related to medical treatment and are necessary for compliance      Appt: [facility] 3.5.2021 at 215    Denies any needs at this time. Denies any issues that need Doctor attention.  Will call with concerns

## 2021-03-05 ENCOUNTER — HOSPITAL ENCOUNTER (OUTPATIENT)
Dept: ULTRASOUND IMAGING | Age: 56
Discharge: HOME | End: 2021-03-07
Payer: MEDICARE

## 2021-03-05 DIAGNOSIS — R20.0 NUMBNESS: ICD-10-CM

## 2021-03-05 DIAGNOSIS — I73.9 PERIPHERAL VASCULAR DISEASE, UNSPECIFIED (HCC): ICD-10-CM

## 2021-03-05 PROCEDURE — 93924 LWR XTR VASC STDY BILAT: CPT

## 2021-03-05 PROCEDURE — 93923 UPR/LXTR ART STDY 3+ LVLS: CPT | Performed by: INTERNAL MEDICINE

## 2021-03-08 ENCOUNTER — TELEPHONE (OUTPATIENT)
Dept: INFECTIOUS DISEASES | Age: 56
End: 2021-03-08

## 2021-03-08 NOTE — TELEPHONE ENCOUNTER
Pt called into clinic requesting help with basic needs. Requesting food/gas voucher    Pt into [facility]  Needs assessed. 2 COVID food and 1 gas card given to pt. Denies other needs at this time and will call with concerns and/or updates      PPE provided and discussed with pt. Provided face mask ----  yes  Cloth masks--  0  Surgical masks--- 50  hand  -- 2  cleaning wipes -- 1  Toilet paper- 2   RW Part A Provision, to reduce the spread of Covid-19.      COVID food vouchers ---  YES x 2    Pt denies any other issues or concerns that need doctor attention and will call office if needed

## 2021-03-11 ENCOUNTER — TELEPHONE (OUTPATIENT)
Dept: INFECTIOUS DISEASES | Age: 56
End: 2021-03-11

## 2021-03-11 NOTE — TELEPHONE ENCOUNTER
Pt called into office in need of help with transport for up coming apt. .   Rn and pt reviewed additional transport options and assistance. It is determined he will use pegasus as last report for help. Aware of policy and  time. Pegasus notified. All appts are related to medical treatment and are necessary for compliance      Appt: [employer] 3.29.21 at 56      Pt sister is evicting him. provided active listening as pt discussed frustrations   Denies any needs at this time. Denies any issues that need Doctor attention.  Will call with concerns

## 2021-03-24 ENCOUNTER — TELEPHONE (OUTPATIENT)
Dept: INFECTIOUS DISEASES | Age: 56
End: 2021-03-24

## 2021-03-24 NOTE — TELEPHONE ENCOUNTER
CM called pt to remind him of transportation arrangements to Dr. Peter Fleming office at [facility] 3/25/21.

## 2021-03-29 ENCOUNTER — TELEPHONE (OUTPATIENT)
Dept: INFECTIOUS DISEASES | Age: 56
End: 2021-03-29

## 2021-03-29 NOTE — TELEPHONE ENCOUNTER
Pt came into clinic requesting PPE. Provided face masks (10 surgical, 2 hand , 1 cleaning wipes, and 2 rolls of toilet paper) RW Part A Provision, to reduce the spread of Covid-19. COVID food vouchers-- No. None requested, none given. Pt denies any issues at this time that need a doctor's immediate attention. Stated he would call with any questions or concerns that may arise.

## 2021-04-13 ENCOUNTER — TELEPHONE (OUTPATIENT)
Dept: INFECTIOUS DISEASES | Age: 56
End: 2021-04-13

## 2021-04-13 DIAGNOSIS — Z12.5 PROSTATE CANCER SCREENING: ICD-10-CM

## 2021-04-13 DIAGNOSIS — B20 SYMPTOMATIC HIV INFECTION (HCC): Primary | ICD-10-CM

## 2021-04-14 ENCOUNTER — TELEPHONE (OUTPATIENT)
Dept: INFECTIOUS DISEASES | Age: 56
End: 2021-04-14

## 2021-04-15 DIAGNOSIS — Z12.5 PROSTATE CANCER SCREENING: ICD-10-CM

## 2021-04-15 DIAGNOSIS — E55.9 VITAMIN D DEFICIENCY: ICD-10-CM

## 2021-04-15 DIAGNOSIS — B20 SYMPTOMATIC HIV INFECTION (HCC): ICD-10-CM

## 2021-04-15 LAB
ALBUMIN SERPL-MCNC: 4.3 G/DL (ref 3.5–4.6)
ALP BLD-CCNC: 107 U/L (ref 35–104)
ALT SERPL-CCNC: 18 U/L (ref 0–41)
ANION GAP SERPL CALCULATED.3IONS-SCNC: 15 MEQ/L (ref 9–15)
AST SERPL-CCNC: 17 U/L (ref 0–40)
BILIRUB SERPL-MCNC: 0.3 MG/DL (ref 0.2–0.7)
BUN BLDV-MCNC: 17 MG/DL (ref 6–20)
CALCIUM SERPL-MCNC: 9.6 MG/DL (ref 8.5–9.9)
CHLORIDE BLD-SCNC: 108 MEQ/L (ref 95–107)
CO2: 22 MEQ/L (ref 20–31)
CREAT SERPL-MCNC: 1.5 MG/DL (ref 0.7–1.2)
GFR AFRICAN AMERICAN: 59
GFR NON-AFRICAN AMERICAN: 48.7
GLOBULIN: 3.1 G/DL (ref 2.3–3.5)
GLUCOSE BLD-MCNC: 110 MG/DL (ref 70–99)
HCT VFR BLD CALC: 52.2 % (ref 42–52)
HEMOGLOBIN: 17.5 G/DL (ref 14–18)
HEPATITIS C ANTIBODY INTERPRETATION: NORMAL
MCH RBC QN AUTO: 32.9 PG (ref 27–31.3)
MCHC RBC AUTO-ENTMCNC: 33.5 % (ref 33–37)
MCV RBC AUTO: 98.4 FL (ref 80–100)
PDW BLD-RTO: 13.9 % (ref 11.5–14.5)
PLATELET # BLD: 211 K/UL (ref 130–400)
POTASSIUM SERPL-SCNC: 4.3 MEQ/L (ref 3.4–4.9)
PROSTATE SPECIFIC ANTIGEN: 0.39 NG/ML (ref 0–3.89)
RBC # BLD: 5.3 M/UL (ref 4.7–6.1)
SODIUM BLD-SCNC: 145 MEQ/L (ref 135–144)
TOTAL PROTEIN: 7.4 G/DL (ref 6.3–8)
VITAMIN D 25-HYDROXY: 22.1 NG/ML (ref 30–100)
WBC # BLD: 7.6 K/UL (ref 4.8–10.8)

## 2021-04-16 ENCOUNTER — TELEPHONE (OUTPATIENT)
Dept: INFECTIOUS DISEASES | Age: 56
End: 2021-04-16

## 2021-04-16 LAB
ABSOLUTE CD 4 HELPER: 416 CELLS/UL (ref 430–1800)
CD4 % HELPER T CELL: 21 % (ref 32–64)
LYMPHOCYTE SUBSET PANEL 2 INFO: ABNORMAL
RPR: NORMAL

## 2021-04-16 NOTE — TELEPHONE ENCOUNTER
Pt called in stating he needed to cancel transport due to appointments being rescheduled, on 4/20/21 and 4/21/21, and rescheduled his transport for 4/19/21 and 4/28/21. Transport canceled with in approbate time frame. Transport updated per policy. Nurse and pt reviewed additional transport options and assistance. It is determined he will use [facility] as last report for help. Discussed transport policy. Discussed need to call 2 hours prior to apt time for cancellation. Pt verbalized all understanding. [facility] notified. Pt aware transport will be there for  about 30 mins prior to apt. All appts are related to medical treatment and are necessary for compliance  Discussed with pt the possibility of changes due to COVID. Pt verbalized understanding. Appts: 4/19/21 @ 8:20am at the [facility].               4/28/21 @10:45am with Dr. Ella Nieves, [address]. Denies any needs at this time. Denies any issues that need Doctor attention.  Will call with concerns

## 2021-04-18 LAB
HIV-1 QNT LOG, IU/ML: NOT DETECTED LOG CPY/ML
HIV-1 QNT, IU/ML: NOT DETECTED CPY/ML
INTERPRETATION: NOT DETECTED
QUANTI TB GOLD PLUS: NEGATIVE
QUANTI TB1 MINUS NIL: 0.01 IU/ML (ref 0–0.34)
QUANTI TB2 MINUS NIL: 0 IU/ML (ref 0–0.34)
QUANTIFERON MITOGEN: 7.61 IU/ML
QUANTIFERON NIL: 0.05 IU/ML

## 2021-04-20 ENCOUNTER — NURSE ONLY (OUTPATIENT)
Dept: INFECTIOUS DISEASES | Age: 56
End: 2021-04-20

## 2021-04-20 LAB
C. TRACHOMATIS DNA ,URINE: NEGATIVE
N. GONORRHOEAE DNA, URINE: NEGATIVE
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
## 2021-04-22 ENCOUNTER — TELEPHONE (OUTPATIENT)
Dept: INFECTIOUS DISEASES | Age: 56
End: 2021-04-22

## 2021-04-22 NOTE — TELEPHONE ENCOUNTER
Pt called in to update phone number. [phone]    Pt otherwise doing ok. Still at shelter. Working with [facility] for . Pt called in requesting to review recent lab work. Reviewed resulted labs. Pt denied questions or concerns. Adherence-- states he is taking meds and not having issues. Denies any needs at this time. Denies any issues that need Doctor attention.  Will call with concerns

## 2021-04-27 ENCOUNTER — TELEPHONE (OUTPATIENT)
Dept: INFECTIOUS DISEASES | Age: 56
End: 2021-04-27

## 2021-04-27 NOTE — TELEPHONE ENCOUNTER
CM called pt to remind him of transport/appointment 4/28/21 with Dr. Sunni Robins. Transport  at [address] informed pt that recent [facility] Application is not due until 8/21. CM will submit application at that time. CM will follow up with pt as needed.

## 2021-04-29 ENCOUNTER — TELEPHONE (OUTPATIENT)
Dept: INFECTIOUS DISEASES | Age: 56
End: 2021-04-29

## 2021-04-29 ENCOUNTER — VIRTUAL VISIT (OUTPATIENT)
Dept: INFECTIOUS DISEASES | Age: 56
End: 2021-04-29
Payer: MEDICARE

## 2021-04-29 DIAGNOSIS — Z86.14 HISTORY OF MRSA INFECTION: ICD-10-CM

## 2021-04-29 DIAGNOSIS — F14.90 CRACK COCAINE USE: ICD-10-CM

## 2021-04-29 DIAGNOSIS — B20 HIV INFECTION, UNSPECIFIED SYMPTOM STATUS (HCC): Primary | ICD-10-CM

## 2021-04-29 DIAGNOSIS — Z72.0 TOBACCO ABUSE: ICD-10-CM

## 2021-04-29 PROCEDURE — 4004F PT TOBACCO SCREEN RCVD TLK: CPT | Performed by: INTERNAL MEDICINE

## 2021-04-29 PROCEDURE — 99214 OFFICE O/P EST MOD 30 MIN: CPT | Performed by: INTERNAL MEDICINE

## 2021-04-29 PROCEDURE — 3017F COLORECTAL CA SCREEN DOC REV: CPT | Performed by: INTERNAL MEDICINE

## 2021-04-29 PROCEDURE — G8417 CALC BMI ABV UP PARAM F/U: HCPCS | Performed by: INTERNAL MEDICINE

## 2021-04-29 PROCEDURE — G8428 CUR MEDS NOT DOCUMENT: HCPCS | Performed by: INTERNAL MEDICINE

## 2021-04-29 RX ORDER — ERGOCALCIFEROL 1.25 MG/1
50000 CAPSULE ORAL WEEKLY
Qty: 4 CAPSULE | Refills: 0 | Status: SHIPPED | OUTPATIENT
Start: 2021-04-29

## 2021-04-29 NOTE — TELEPHONE ENCOUNTER
Pt called into office in need of help with transport for up coming apt. .   Rn and pt reviewed additional transport options and assistance. It is determined he will use [facility] as last report for help. Aware of policy and  time. [facility] notified. All appts are related to medical treatment and are necessary for compliance    Appt:   Dr Kimberly Spann [phone] at [address]        Denies any needs at this time. Denies any issues that need Doctor attention.  Will call with concerns

## 2021-04-29 NOTE — PROGRESS NOTES
2021      Patient Name: Krish Sewell  YOB: 1965  Patient Sex: male  Medical Record Number: [id number]    2021    TELEHEALTH EVALUATION -- Audio/Visual (During XXIEH-49 public health emergency)      Krish Sewell (:  1965) has requested an audio/video evaluation for the following concern(s):    HIV    Due to the COVID-19 outbreak, patient's office visit was converted to a virtual visit. Patient was contacted and agreed to proceed with a virtual visit with me via Doxy. me with my location at the office. Patient reports that they are located at home. The risks and benefits of converting to a virtual visit were discussed in light of the current infectious disease epidemic. Services were provided through an audio/video synchronous discussion virtually to substitute for in person clinic visit. THIS virtual visit was conducted via interactive/real-time audio/video. Due to this being a TeleHealth encounter, evaluation of the following organ systems is limited: Vitals/Constitutional/EENT/Resp/CV/GI//MS/Neuro/Skin/Heme-Lymph-Imm.}    Pursuant to the emergency declaration under the [facility], [facility] waiver authority and the [employer] Act, this Virtual Visit was conducted, with patient's consent, to reduce the patient's risk of exposure to COVID-19 and provide care for the patient. Background:    Patient was diagnosed with HIV in  in [facility]. Had thrush at the time. HIs CD4 was noted to be 328 and genotype was negative for any resistance. Started on Combivir and Sustiva but quit taking meds a few years later. In  was started on Atripla but stopped 3-4 years later. Then started taking Triumeq about 3 years ago but stopped taking that as well within a few years. He is back on triumeq and compliant with medications.     Hx of crack use and tobacco. Has had over 100 sexual partners over his lifetime - males. Hx of multiple suicide attempts,He has been institutionalized a few times for depression and suicide attempts. No STD history, he is edentulous, has a family hx of colon cancer and skin cancers. He had neck and lip squamous and basal cell cancers removed. Father had stage 4 cancer renal cancer with mets and passed away. Mother passed away as well. Moved to [place] temporarily after his father passed away and is back as of October 2018. Got a new PCP ( Dr Joycelyn Gillette )  Saw Dr Nnia Tom for podiatry and surgery was 1/21/2019 - wound went down to bone. Persistent issues with cellulitis of the ankle. He tends to pick at the wound and ashlee abscesses himself. + MRSA hx.   Had EGD on 1/16/2019 - no cancerous findings. Has had recurrent issues with the achilles   He is on Triumeq. Last visit:   CD4 448/ 22%  Undetectable viral load  Cr 1.18 and LFTs ok      Last wound culture of the foot: GPC, Beta group C strep, and CONS, on Bactrim. Asked to avoid self inflicted injury to the area. Has been following with his PCP  Has been taking daily Bactrim for his foot. His Cr has increased. This was discussed. I have asked him to check in with nephrology and he prefers not to at this time. I have asked him to cut down the Bactrim to MWF. Following with podiatry now. Living in a shelter - does not drink much water. States that the water there tastes badly so he drinks little. He states that if he gets an infection, he will have to leave bc of the rules. I have asked about his covid vaccine status. He states that he will not take the COVID vaccine and he will not take the flu vaccine. Review of Systems:  All 14 systems reviewed with patient and are negative other than as stated above.     Past Medical History  Past Medical History:   Diagnosis Date    Anxiety     Cancer ([facility]     skin  L arm    Cellulitis of heel, right 2016    after surgery x 3 on tendion     Depression     Erectile dysfunction     Headache(784.0)     HIV (human immunodeficiency virus infection) ([address]     Hyperlipidemia     Liver disease     Osteoarthritis        Past Surgical History  Past Surgical History:   Procedure Laterality Date    CHOLECYSTECTOMY      CYST REMOVAL      R wrist x2    FOOT SURGERY      bone extraction    FRACTURE SURGERY      UPPER GASTROINTESTINAL ENDOSCOPY N/A 1/16/2019    EGD ESOPHAGOGASTRODUODEN[facility] [address] [facility]       Allergies  Allergies   Allergen Reactions    Methadone Other (See Comments)     Chest pain  Chest pain    Morphine Other (See Comments)     Chest pain  Chest pains    Nexium [Esomeprazole Magnesium] Nausea Only and Nausea And Vomiting     Upset stomach  Upset stomach    Omeprazole Nausea Only and Nausea And Vomiting     Upset stomach  Upset stomach       Family History  Family History   Problem Relation Age of Onset    High Blood Pressure Mother     Cancer Father        Immunization History  Immunization History   Administered Date(s) Administered    Hepatitis B 06/09/2005, 07/01/2005, 12/02/2005    Hepatitis B vaccine 06/09/2005, 07/01/2005, 12/02/2005    Influenza 10/19/2011    Influenza A ([id number]) Vaccine IM 11/25/2009    Influenza Vaccine, unspecified formulation 10/19/2011, 09/15/2014    Influenza Whole 01/26/2011    PPD Test 01/26/2011, 03/20/2012, 06/08/2015    Pneumococcal Conjugate 13-valent (Gmlqhem23) 04/24/2019    Pneumococcal Conjugate 7-valent (Prevnar7) 03/16/2009    Td vaccine (adult) 10/19/2006    Tdap (Boostrix, Adacel) 04/24/2019    Tetanus 10/19/2006   Since we cannot conduct an in-person exam, the following were addressed with the patient to the best of my capability via virtual visit:   Patient does not perceive any new visual deficits  No diaphoresis or flushing in the face  Patient is able to flex and extend her neck with ease.    Patient can inhale and exhale without any Bactrim back to [facility] for prophylaxis. Illicit drug use: Crack use - continuous  Basal Cell Carcinoma/squamous cell cancer  - recent follow up with derm. States that he has another spot on his face that they are following. Had EGD done - no stricture and no cancer but the swallowing is still an issue overall. Genotype reviewed - some NNRTI resistance. Tobacco use - asked to stop       Prevention  Substance abuse screening performed:   Crack  Counseling provided during this visit > 50% yes  Brief Mental Health Screening performed:  Yes - suicide attempts  Physical abuse screening performed: yes - none    Patient was counseled on the importance of proper handwashing, especially after handling wounds, surfaces, urine, and stool. If any diarrhea develops, patient is to refrain from the use of anti-diarrhea medications and is to call me immediately. Patient should refrain from touching their face. Questions regarding the current COVID outbreak addressed as needed.  Total time spent reviewing the contents of this chart and with patient in total > 25 min        Gwen Benavides

## 2021-04-30 ENCOUNTER — TELEPHONE (OUTPATIENT)
Dept: INFECTIOUS DISEASES | Age: 56
End: 2021-04-30

## 2021-04-30 RX ORDER — ZALEPLON 10 MG/1
CAPSULE ORAL
COMMUNITY
Start: 2021-02-10

## 2021-04-30 RX ORDER — HYDROXYZINE PAMOATE 25 MG/1
CAPSULE ORAL
COMMUNITY
Start: 2020-12-09

## 2021-04-30 RX ORDER — SULFAMETHOXAZOLE AND TRIMETHOPRIM 800; 160 MG/1; MG/1
TABLET ORAL
COMMUNITY
Start: 2021-04-20 | End: 2021-09-20

## 2021-04-30 RX ORDER — PREGABALIN 100 MG/1
CAPSULE ORAL
COMMUNITY
Start: 2020-09-23

## 2021-04-30 RX ORDER — SUMATRIPTAN 100 MG/1
TABLET, FILM COATED ORAL
COMMUNITY
Start: 2021-04-28

## 2021-04-30 NOTE — TELEPHONE ENCOUNTER
Pt walked into clinic requesting PPE. Pt is homeless and uses same mask over and over. PPE provided and discussed with pt. Provided face mask ----  yes  Cloth masks--  0  Surgical masks--- 50 ( 1 box)   hand  -- denied need  cleaning wipes -- denied need  Toilet paper-- 2  RW Part A Provision, to reduce the spread of Covid-19. COVID food vouchers --- no-- denied need at this time. Reviewed pt medication list and updated.      Pt denies any other issues or concerns that need doctor attention and will call office if needed

## 2021-05-04 ENCOUNTER — TELEPHONE (OUTPATIENT)
Dept: INFECTIOUS DISEASES | Age: 56
End: 2021-05-04

## 2021-05-04 NOTE — TELEPHONE ENCOUNTER
Pt called in with concerns of COVID exposure and [employer]. States 4 people are COVID + and he had been around them with out mask. He has testing for COVID today and will call with update. Denies symptoms currently.

## 2021-05-13 ENCOUNTER — TELEPHONE (OUTPATIENT)
Dept: INFECTIOUS DISEASES | Age: 56
End: 2021-05-13

## 2021-05-13 NOTE — TELEPHONE ENCOUNTER
Pt called in requesting letter for a bed closer to window to help ease breathing. He is at homeless shelter and feels that a bed closer to window will help with his breathing. Letter completed and given to pt. Also noted he needs BMP completed. Rn and pt reviewed additional transport options and assistance. It is determined he will use [facility] as last report for help. Aware of policy and  time. [facility] notified. All appts are related to medical treatment and are necessary for compliance      Appt:    5.17.2021  at 9 am to [address] [place] [address],[address] [facility]. Denies any needs at this time. Denies any issues that need Doctor attention.  Will call with concerns

## 2021-05-14 ENCOUNTER — TELEPHONE (OUTPATIENT)
Dept: INFECTIOUS DISEASES | Age: 56
End: 2021-05-14

## 2021-05-14 NOTE — TELEPHONE ENCOUNTER
Pt called into office in need of help with transport for up coming apt. .   Rn and pt reviewed additional transport options and assistance. It is determined he will use [facility] as last report for help. Aware of policy and  time. [facility] notified. All appts are related to medical treatment and are necessary for compliance        Appt: 5.19.21 at 1 pm at [facility]. Denies any needs at this time. Denies any issues that need Doctor attention.  Will call with concerns

## 2021-05-17 DIAGNOSIS — B20 HIV INFECTION, UNSPECIFIED SYMPTOM STATUS (HCC): ICD-10-CM

## 2021-05-17 LAB
ANION GAP SERPL CALCULATED.3IONS-SCNC: 11 MEQ/L (ref 9–15)
BUN BLDV-MCNC: 15 MG/DL (ref 6–20)
CALCIUM SERPL-MCNC: 9.9 MG/DL (ref 8.5–9.9)
CHLORIDE BLD-SCNC: 105 MEQ/L (ref 95–107)
CO2: 26 MEQ/L (ref 20–31)
CREAT SERPL-MCNC: 1.02 MG/DL (ref 0.7–1.2)
GFR AFRICAN AMERICAN: >60
GFR NON-AFRICAN AMERICAN: >60
GLUCOSE BLD-MCNC: 78 MG/DL (ref 70–99)
POTASSIUM SERPL-SCNC: 4.5 MEQ/L (ref 3.4–4.9)
SODIUM BLD-SCNC: 142 MEQ/L (ref 135–144)

## 2021-05-19 ENCOUNTER — TELEPHONE (OUTPATIENT)
Dept: INFECTIOUS DISEASES | Age: 56
End: 2021-05-19

## 2021-05-19 NOTE — TELEPHONE ENCOUNTER
Pt presented to office as walk in,   Requesting [facility] store voucher. He is homeless and has limited clothes. Voucher given. Also he requested a print out of all his diagnosis and history. KAYLAH signed as he wants to give this information to [address]. Will call office if needed.

## 2021-05-21 ENCOUNTER — TELEPHONE (OUTPATIENT)
Dept: INFECTIOUS DISEASES | Age: 56
End: 2021-05-21
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
## 2021-05-25 ENCOUNTER — TELEPHONE (OUTPATIENT)
Dept: INFECTIOUS DISEASES | Age: 56
End: 2021-05-25

## 2021-05-26 ENCOUNTER — TELEPHONE (OUTPATIENT)
Dept: INFECTIOUS DISEASES | Age: 56
End: 2021-05-26

## 2021-05-26 NOTE — TELEPHONE ENCOUNTER
Pt called into office in need of help with transport for up coming apt. .   Rn and pt reviewed additional transport options and assistance. It is determined he will use [facility] as last report for help. Aware of policy and  time. [facility] notified. All appts are related to medical treatment and are necessary for compliance    Appt:   5.27.2021 at 1015 -- Dr Oneida Tate at [address] [place] [address] [facility], [facility], [address]    Denies any needs at this time. Denies any issues that need Doctor attention.  Will call with concerns

## 2021-06-02 ENCOUNTER — TELEPHONE (OUTPATIENT)
Dept: INFECTIOUS DISEASES | Age: 56
End: 2021-06-02

## 2021-06-02 NOTE — TELEPHONE ENCOUNTER
CM called pt to remind him of transport/appointment 6/3/21 with podiatrist.  Per request, CM scheduled transport 7/22/21 @ 8AM  For appointment with PCP. CM assessed need, made arrangements as last resort.

## 2021-06-04 ENCOUNTER — TELEPHONE (OUTPATIENT)
Dept: INFECTIOUS DISEASES | Age: 56
End: 2021-06-04

## 2021-06-04 NOTE — TELEPHONE ENCOUNTER
Pt called in with update from podiatrist. Was told to stop bactrim and start augmentin 875 mg BID for 12 days. Unsure if he needs apt with Dr Leonid Goncalves. Will discuss POC with Dr and call pt back. He also mentions a wound apt with [facility] wound center 6.10.21  Rn and pt reviewed additional transport options and assistance. It is determined he will use [facility] as last report for help. Aware of policy and  time. [facility] notified. All appts are related to medical treatment and are necessary for compliance    Apt: 6.10.21at 145 with  wound center [address] [place] [address]-- spoke with Dr Leonid Goncalves. Reviewed wound culture. Ok for Augmentin. Restart bactrim once Augmentin completed. Follow up this month with [facility] or office with Dr Quentin Brown. 949--Call placed to pt to discuss poc  VM left for him to call office to discuss further.

## 2021-06-07 NOTE — TELEPHONE ENCOUNTER
Pt returned call. Reviewed Dr Rachel Flores POC. Pt prefered to see Dr Rachel Flores.    V.V scheduled for 6.17.2021 at 12

## 2021-06-08 ENCOUNTER — TELEPHONE (OUTPATIENT)
Dept: INFECTIOUS DISEASES | Age: 56
End: 2021-06-08

## 2021-06-08 NOTE — TELEPHONE ENCOUNTER
Call received from pt, inquiring on the time of his appointment this Thursday. Appointment time is 12:00pm. Pt made aware and verbalized understanding.

## 2021-06-09 ENCOUNTER — TELEPHONE (OUTPATIENT)
Dept: INFECTIOUS DISEASES | Age: 56
End: 2021-06-09

## 2021-06-09 NOTE — TELEPHONE ENCOUNTER
CM called pt to remind him of transport/appointment 6/10/21 @ 1:15PM for [address] in [facility]

## 2021-06-10 RX ORDER — ABACAVIR SULFATE, DOLUTEGRAVIR SODIUM, LAMIVUDINE 600; 50; 300 MG/1; MG/1; MG/1
TABLET, FILM COATED ORAL
Qty: 30 TABLET | Refills: 2 | Status: SHIPPED | OUTPATIENT
Start: 2021-06-10 | End: 2021-10-18

## 2021-06-11 ENCOUNTER — TELEPHONE (OUTPATIENT)
Dept: INFECTIOUS DISEASES | Age: 56
End: 2021-06-11

## 2021-06-11 NOTE — TELEPHONE ENCOUNTER
Pt called into office in need of help with transport for up coming apt. .   Rn and pt reviewed additional transport options and assistance. It is determined he will use [facility] as last report for help. Aware of policy and  time. [facility] notified. All appts are related to medical treatment and are necessary for compliance    Appt:  6.25.21 [facility] at 1 p,    Denies any needs at this time. Denies any issues that need Doctor attention.  Will call with concerns

## 2021-06-11 NOTE — TELEPHONE ENCOUNTER
Pt called into office in need of help with transport for up coming apt. .   Rn and pt reviewed additional transport options and assistance. It is determined he will use [facility] as last report for help. Aware of policy and  time. [facility] notified. All appts are related to medical treatment and are necessary for compliance      Appt: :   6.17.21 -- Dr Haven Ramos at [address], [facility], [address]    Denies any needs at this time. Denies any issues that need Doctor attention.  Will call with concerns

## 2021-06-14 ENCOUNTER — TELEPHONE (OUTPATIENT)
Dept: INFECTIOUS DISEASES | Age: 56
End: 2021-06-14

## 2021-06-16 ENCOUNTER — TELEPHONE (OUTPATIENT)
Dept: INFECTIOUS DISEASES | Age: 56
End: 2021-06-16

## 2021-06-16 NOTE — TELEPHONE ENCOUNTER
CM called pt, left message reminding pt of appointment and transport for 6/17/21 @ 2PM for Podiatrist.

## 2021-06-17 ENCOUNTER — VIRTUAL VISIT (OUTPATIENT)
Dept: INFECTIOUS DISEASES | Age: 56
End: 2021-06-17
Payer: MEDICARE

## 2021-06-17 DIAGNOSIS — T14.8XXA NONHEALING NONSURGICAL WOUND: ICD-10-CM

## 2021-06-17 DIAGNOSIS — Z79.2 CHRONIC ANTIBIOTIC SUPPRESSION: ICD-10-CM

## 2021-06-17 DIAGNOSIS — Z86.14 HISTORY OF MRSA INFECTION: ICD-10-CM

## 2021-06-17 DIAGNOSIS — B20 HIV INFECTION, UNSPECIFIED SYMPTOM STATUS (HCC): Primary | ICD-10-CM

## 2021-06-17 DIAGNOSIS — L03.115 CELLULITIS OF RIGHT FOOT: ICD-10-CM

## 2021-06-17 DIAGNOSIS — F14.90 CRACK COCAINE USE: ICD-10-CM

## 2021-06-17 DIAGNOSIS — Z72.0 TOBACCO ABUSE: ICD-10-CM

## 2021-06-17 PROCEDURE — G8428 CUR MEDS NOT DOCUMENT: HCPCS | Performed by: INTERNAL MEDICINE

## 2021-06-17 PROCEDURE — 3017F COLORECTAL CA SCREEN DOC REV: CPT | Performed by: INTERNAL MEDICINE

## 2021-06-17 PROCEDURE — 99214 OFFICE O/P EST MOD 30 MIN: CPT | Performed by: INTERNAL MEDICINE

## 2021-06-17 PROCEDURE — G8417 CALC BMI ABV UP PARAM F/U: HCPCS | Performed by: INTERNAL MEDICINE

## 2021-06-17 PROCEDURE — 4004F PT TOBACCO SCREEN RCVD TLK: CPT | Performed by: INTERNAL MEDICINE

## 2021-06-17 NOTE — PROGRESS NOTES
2021      Patient Name: Yara Biggs  YOB: 1965  Patient Sex: male  Medical Record Number: [id number]    2021    TELEHEALTH EVALUATION -- Audio/Visual (During IECRR-35 public health emergency)      Yara Biggs (:  1965) has requested an audio/video evaluation for the following concern(s):    HIV    Due to the COVID-19 outbreak, patient's office visit was converted to a virtual visit. Patient was contacted and agreed to proceed with a virtual visit with me via Doxy. me with my location at the office. Patient reports that they are located at home. The risks and benefits of converting to a virtual visit were discussed in light of the current infectious disease epidemic. Services were provided through an audio/video synchronous discussion virtually to substitute for in person clinic visit. THIS virtual visit was conducted via interactive/real-time audio/video. Due to this being a TeleHealth encounter, evaluation of the following organ systems is limited: Vitals/Constitutional/EENT/Resp/CV/GI//MS/Neuro/Skin/Heme-Lymph-Imm.}    Pursuant to the emergency declaration under the [facility], [facility] waiver authority and the [employer] Act, this Virtual Visit was conducted, with patient's consent, to reduce the patient's risk of exposure to COVID-19 and provide care for the patient. Background:    Patient was diagnosed with HIV in  in [place]. Had thrush at the time. HIs CD4 was noted to be 328 and genotype was negative for any resistance. Started on Combivir and Sustiva but quit taking meds a few years later. In  was started on Atripla but stopped 3-4 years later. Then started taking Triumeq about 3 years ago but stopped taking that as well within a few years. He is back on triumeq and compliant with medications.     Hx of crack use and tobacco. Has had over 100 sexual partners over to the best of my capability via virtual visit:   Patient does not perceive any new visual deficits  No diaphoresis or flushing in the face  Patient is able to flex and extend her neck with ease. Patient can inhale and exhale without any difficulty and chest seems to be expanding symmetrically. No conversational dyspnea. Patient does not feel any palpitations   Patient's  abdomen is not protruberant beyond their normal size. No new swelling of their joints. No observed neurological changes or slurred speech when speaking to the patient    No new skin rash or ulcers  R foot wound is stable at the present time. Current labs  Lab Results   Component Value Date    VI0WSPD 440 12/17/2012    JK8MYGF 683 06/08/2012    JQ2CIMP 547 03/07/2012    NI4FSUQ 611 12/12/2011         Chemistry        Component Value Date/Time     05/17/2021 0927    K 4.5 05/17/2021 0927     05/17/2021 0927    CO2 26 05/17/2021 0927    BUN 15 05/17/2021 0927    CREATININE 1.02 05/17/2021 0927        Component Value Date/Time    CALCIUM 9.9 05/17/2021 0927    ALKPHOS 107 (H) 04/15/2021 1530    AST 17 04/15/2021 1530    ALT 18 04/15/2021 1530    BILITOT 0.3 04/15/2021 1530          No components found for: CHLPL  Lab Results   Component Value Date    TRIG 277 (H) 03/31/2020    TRIG 120 04/17/2019    TRIG 170 03/14/2018     Lab Results   Component Value Date    HDL 41 03/31/2020    HDL 44 04/17/2019    HDL 28 (L) 03/14/2018     Lab Results   Component Value Date    LDLCALC 112 03/31/2020    LDLCALC 119 04/17/2019    LDLCALC 62 03/14/2018     No results found for: LABVLDL  Lab Results   Component Value Date    HEPAIGM Non-reactive 04/24/2019    HEPBCAB NEGATIVE 12/12/2011     Lab Results   Component Value Date    RPR NON REAC 03/07/2012       Impression/Plan   HIV infection - Triumeq. Stressed compliance. Cellulitis of R foot with chronic OM - MRSA on last culture. Per patient is resolved and closed.  He has been taking daily bactrim. Follows with Dr Jay Zabala in [place] but has had issues making his appointments recently. We offered help with scheduling another visit - he said he will schedule himself. Elevated Cr. I am concerned about this and have asked that he follow with his PCP or nephrology. He does not want to see nephrology at this time. In the meantime, we cut the Bactrim back to MWF for prophylaxis. Illicit drug use: Crack use - continuous  Basal Cell Carcinoma/squamous cell cancer  - recent follow up with derm. States that he has another spot on his face that they are following. Had EGD done - no stricture and no cancer but the swallowing is still an issue overall. Will be following up with GI for the same issue. He prefers to follow up any other day than on Thursdays. Genotype reviewed - some NNRTI resistance. Tobacco use - asked to stop   No refills needed right now. Prevention  Substance abuse screening performed:   Crack  Counseling provided during this visit > 50% yes  Brief Mental Health Screening performed:  Yes - suicide attempts  Physical abuse screening performed: yes - none    Patient was counseled on the importance of proper handwashing, especially after handling wounds, surfaces, urine, and stool. If any diarrhea develops, patient is to refrain from the use of anti-diarrhea medications and is to call me immediately. Patient should refrain from touching their face. Questions regarding the current COVID outbreak addressed as needed.  Total time spent reviewing the contents of this chart and with patient in total 30  min        Gwen Benavides DO

## 2021-06-18 ENCOUNTER — TELEPHONE (OUTPATIENT)
Dept: INFECTIOUS DISEASES | Age: 56
End: 2021-06-18

## 2021-06-18 NOTE — TELEPHONE ENCOUNTER
Patient called into clinic, requesting PPE. Patient came into clinic to  PPE. Provided face mask 1 box surgical, 2 hand  and 2 rolls of toilet paper. RW Part A Provision, to reduce the spread of Covid-19. COVID food vouchers- No.     Pt denies any further needs, issues, or concerns at this time. States he will call the clinic if any questions or concerns arise.

## 2021-06-22 ENCOUNTER — TELEPHONE (OUTPATIENT)
Dept: INFECTIOUS DISEASES | Age: 56
End: 2021-06-22

## 2021-06-22 NOTE — TELEPHONE ENCOUNTER
Pt called in needing help with transport for upcoming appt. Nurse and pt reviewed additional transport options and assistance. It is determined he will use [facility] as last report for help. Discussed transport policy. Discussed need to call 2 hours prior to apt time for cancellation. Pt verbalized all understanding. [facility] notified. Pt aware transport will be there for  about 30 mins prior to apt. All appts are related to medical treatment and are necessary for compliance    Discussed with pt the possibility of changes due to COVID. Pt verbalized understanding. Appts: 6/24/21 @ 2:45pm with Dr. Fournier Andreas [address]              6/25/21 @ 1:00pm at the [facility], [address] [facility].              6/28/21 @ 11:45am with Dr. Mateusz Delacruz, [address] [place] [address]. Denies any needs at this time. Denies any issues that need Doctor attention. Will call if any questions or concerns.

## 2021-06-25 ENCOUNTER — TELEPHONE (OUTPATIENT)
Dept: INFECTIOUS DISEASES | Age: 56
End: 2021-06-25

## 2021-06-29 ENCOUNTER — TELEPHONE (OUTPATIENT)
Dept: INFECTIOUS DISEASES | Age: 56
End: 2021-06-29

## 2021-06-30 DIAGNOSIS — J44.1 COPD EXACERBATION (HCC): ICD-10-CM

## 2021-06-30 RX ORDER — ALBUTEROL SULFATE 90 UG/1
AEROSOL, METERED RESPIRATORY (INHALATION)
Qty: 1 INHALER | Refills: 2 | Status: SHIPPED | OUTPATIENT
Start: 2021-06-30 | End: 2021-10-26 | Stop reason: SDUPTHER

## 2021-07-09 ENCOUNTER — TELEPHONE (OUTPATIENT)
Dept: INFECTIOUS DISEASES | Age: 56
End: 2021-07-09

## 2021-07-09 NOTE — TELEPHONE ENCOUNTER
Pt called into office in need of help with transport for up coming apt. .   Rn and pt reviewed additional transport options and assistance. It is determined he will use [facility] as last report for help. Aware of policy and  time. [facility] notified. All appts are related to medical treatment and are necessary for compliance      Appt:   7.14.2021 at 9 am Dr. Calderon Nones [address] Denies any needs at this time. Denies any issues that need Doctor attention.  Will call with concerns

## 2021-07-13 ENCOUNTER — TELEPHONE (OUTPATIENT)
Dept: INFECTIOUS DISEASES | Age: 56
End: 2021-07-13

## 2021-07-14 ENCOUNTER — TELEPHONE (OUTPATIENT)
Dept: INFECTIOUS DISEASES | Age: 56
End: 2021-07-14

## 2021-07-19 ENCOUNTER — TELEPHONE (OUTPATIENT)
Dept: INFECTIOUS DISEASES | Age: 56
End: 2021-07-19

## 2021-07-19 DIAGNOSIS — B20 HIV INFECTION, UNSPECIFIED SYMPTOM STATUS (HCC): Primary | ICD-10-CM

## 2021-07-19 NOTE — TELEPHONE ENCOUNTER
Pt called in to verify apt. Apt scheduled 7/21/21. No clinic  Rescheduled 8.26.21 at 930 V/V   Offered pt apt with different provider. Pt prefers Dr Lord Printers orders placed ok per Dr Nathaly Ahn. He will complete labs 1 week prior to apt. States his sister will take him for labs. doing well otherwise. Denies other concerns requested he call office if needed.

## 2021-07-22 ENCOUNTER — TELEPHONE (OUTPATIENT)
Dept: INFECTIOUS DISEASES | Age: 56
End: 2021-07-22

## 2021-07-26 ENCOUNTER — TELEPHONE (OUTPATIENT)
Dept: INFECTIOUS DISEASES | Age: 56
End: 2021-07-26

## 2021-07-26 NOTE — TELEPHONE ENCOUNTER
Pt called in with new address. States he moved from [employer] [facility] [employer] to the [facility]. [address], [facility], [address]. Pt Is on transport for tomorrow. [facility] updated with new address. Spoke with pt about need for ODAP renewal. Apt scheduled 8.18.21. Pt is in need of help with transport  Rn and pt reviewed additional transport options and assistance. It is determined he will use [facility] as last report for help. Aware of policy and  time. [facility] notified. All appts are related to medical treatment and are necessary for compliance    Appt: [facility] apt 8.18.21 at 8 am    Denies any needs at this time. Denies any issues that need Doctor attention.  Will call with concerns

## 2021-07-27 ENCOUNTER — TELEPHONE (OUTPATIENT)
Dept: INFECTIOUS DISEASES | Age: 56
End: 2021-07-27

## 2021-07-27 NOTE — TELEPHONE ENCOUNTER
Per request, Cm scheduled transport for appointment with ophthalmologist 8/4/21 @ 8:15AM.  CM assessed need, made arrangments as last resort.

## 2021-07-27 NOTE — TELEPHONE ENCOUNTER
pt into clinic requesting help with basic needs. Requesting food/gas voucher  Needs assessed. 2 food vouchers given to pt. Staying at [facility]. Doing ok    Denies other needs at this time and will call with concerns and/or updates      Denies any needs at this time. Denies any issues that need Doctor attention.  Will call with concerns

## 2021-08-03 ENCOUNTER — TELEPHONE (OUTPATIENT)
Dept: INFECTIOUS DISEASES | Age: 56
End: 2021-08-03

## 2021-08-17 ENCOUNTER — TELEPHONE (OUTPATIENT)
Dept: INFECTIOUS DISEASES | Age: 56
End: 2021-08-17

## 2021-08-17 NOTE — TELEPHONE ENCOUNTER
Pt called in stating he needed to cancel transport. Pt will have his sister bring him to  apt tomorrow. pt will call with other needed transport as last resort  Pt will call with updates or further issues. Call placed to [facility]. Transport canceled with in approbate time frame.

## 2021-08-18 ENCOUNTER — NURSE ONLY (OUTPATIENT)
Dept: INFECTIOUS DISEASES | Age: 56
End: 2021-08-18

## 2021-08-18 NOTE — PROGRESS NOTES
Pt called in for scheduled appointment with CM. CM assisted with OHDAP Application for assistance with medication co-pays. Per request, CM will provide PPE via [employer] A Programs. CM assessed need, provisions as last resort.

## 2021-08-19 ENCOUNTER — TELEPHONE (OUTPATIENT)
Dept: INFECTIOUS DISEASES | Age: 56
End: 2021-08-19

## 2021-08-19 NOTE — TELEPHONE ENCOUNTER
CM received call from Izzy Leigh at [address]. Pt found eligible for Medicare Part D Co pays. Eligibility until 3/2022.

## 2021-08-19 NOTE — TELEPHONE ENCOUNTER
Pt called into office in need of help with transport for up coming apt. .   Rn and pt reviewed additional transport options and assistance. It is determined he will use [facility] as last report for help. Aware of policy and  time. [facility] notified. All appts are related to medical treatment and are necessary for compliance            Appt: 9.10.21 at [facility], [address]. THIS IS  AFTER APT ONLY. His sister will take him to apt. Denies any needs at this time. Denies any issues that need Doctor attention.  Will call with concerns

## 2021-08-24 ENCOUNTER — TELEPHONE (OUTPATIENT)
Dept: INFECTIOUS DISEASES | Age: 56
End: 2021-08-24
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
## 2021-09-02 ENCOUNTER — TELEPHONE (OUTPATIENT)
Dept: INFECTIOUS DISEASES | Age: 56
End: 2021-09-02

## 2021-09-02 NOTE — TELEPHONE ENCOUNTER
Pt called into office in need of help with transport for up coming apt. .   Rn and pt reviewed additional transport options and assistance. It is determined he will use [facility] as last report for help. Aware of policy and  time. [facility] notified. All appts are related to medical treatment and are necessary for compliance    Appt: Dr Karly Kendrick 9.22.21 at 10 am [facility]    Denies any needs at this time. Denies any issues that need Doctor attention.  Will call with concerns

## 2021-09-07 ENCOUNTER — TELEPHONE (OUTPATIENT)
Dept: INFECTIOUS DISEASES | Age: 56
End: 2021-09-07
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
## 2021-09-09 ENCOUNTER — TELEPHONE (OUTPATIENT)
Dept: INFECTIOUS DISEASES | Age: 56
End: 2021-09-09

## 2021-09-09 NOTE — TELEPHONE ENCOUNTER
Pt called into office in need of help with transport for up coming apt. .   Rn and pt reviewed additional transport options and assistance. It is determined he will use [facility] as last report for help. Aware of policy and  time. [facility] notified. All appts are related to medical treatment and are necessary for compliance      Appt: [facility] out pt lab 10.15.21  at 7 am    Denies any needs at this time. Denies any issues that need Doctor attention.  Will call with concerns

## 2021-09-10 ENCOUNTER — TELEPHONE (OUTPATIENT)
Dept: INFECTIOUS DISEASES | Age: 56
End: 2021-09-10

## 2021-09-10 NOTE — TELEPHONE ENCOUNTER
TRANSPORT CHANGES    Cancel lab 10.15.21  Pt has court. Transport changes:   Court-- 10.15.21 830 am [address] [facility] [address], [place], [address] [facility]    Lab changed to   Labs apt 10.14.21  at 7 am     [facility] updated with changes,. Transport is used as last resort. Needs assessed.

## 2021-09-20 ENCOUNTER — TELEPHONE (OUTPATIENT)
Dept: INFECTIOUS DISEASES | Age: 56
End: 2021-09-20

## 2021-09-23 ENCOUNTER — TELEPHONE (OUTPATIENT)
Dept: INFECTIOUS DISEASES | Age: 56
End: 2021-09-23

## 2021-09-23 NOTE — TELEPHONE ENCOUNTER
pt into clinic requesting help with basic needs. Requesting food/gas voucher  Needs assessed. 1 food voucher given to pt. Pt noted he has an upcoming [facility] apt. It is determined he will use [facility] as last report for help. Aware of policy and  time. [facility] notified. All appts are related to medical treatment and are necessary for compliance    [facility]    Denies any needs at this time. Denies any issues that need Doctor attention.  Will call with concerns

## 2021-09-27 ENCOUNTER — TELEPHONE (OUTPATIENT)
Dept: INFECTIOUS DISEASES | Age: 56
End: 2021-09-27

## 2021-09-27 PROBLEM — M19.011 OSTEOARTHRITIS OF RIGHT SHOULDER: Status: ACTIVE | Noted: 2021-09-27

## 2021-09-27 PROBLEM — R91.1 LUNG NODULE: Status: ACTIVE | Noted: 2021-09-27

## 2021-09-27 PROBLEM — E04.1 THYROID NODULE: Status: ACTIVE | Noted: 2021-09-27

## 2021-09-27 PROBLEM — G47.00 INSOMNIA: Status: ACTIVE | Noted: 2021-09-27

## 2021-09-27 NOTE — TELEPHONE ENCOUNTER
Pt called into office requesting diagnosis be faxed to [facility] center. This will help with pt accomodations. Pt gave verbal consent to fax information to Josemanuel Patterson at [facility] [phone]. Per request of patient most recent list of diagnosis faxed with conformation.

## 2021-10-05 ENCOUNTER — TELEPHONE (OUTPATIENT)
Dept: INFECTIOUS DISEASES | Age: 56
End: 2021-10-05

## 2021-10-08 ENCOUNTER — TELEPHONE (OUTPATIENT)
Dept: INFECTIOUS DISEASES | Age: 56
End: 2021-10-08

## 2021-10-08 NOTE — TELEPHONE ENCOUNTER
Pt called in requesting help with upcoming appointment. Nurse and pt reviewed additional transport options and assistance, and is determined pt will use [facility] as last resort for help. Aware of policy and  time. [facility] notified. All appointments are related to medical treatment and are necessary for complinace. Appt: 10/9/21 @ [facility]     Denies any other needs at this time. Denies any issues that need doctor attention. Will call office should any other questions or concerns, arise.

## 2021-10-13 ENCOUNTER — TELEPHONE (OUTPATIENT)
Dept: INFECTIOUS DISEASES | Age: 56
End: 2021-10-13

## 2021-10-13 NOTE — TELEPHONE ENCOUNTER
Call placed to pt regarding need to update RW eligibility and paperwork. Reviewed all needed documentation for update. This includes proof of income ( 1 month total, most recent) proof of residency and/or ID as well as any insurance updates or changes. No changes per pt.    pt is aware of the program, verbalized understanding and denies questions.      CM apt made 10.26.21-- phone    PCP follow up 10.29.21 for HA    Denies other issues or concerns and will call office if needed

## 2021-10-14 ENCOUNTER — TELEPHONE (OUTPATIENT)
Dept: INFECTIOUS DISEASES | Age: 56
End: 2021-10-14

## 2021-10-14 NOTE — TELEPHONE ENCOUNTER
CM out of office 10.26.21  Apt R/S to 10.19.21 at 10 in office. Pt denies issues with change of apt and denies concerns.  Will call office if needed

## 2021-10-15 ENCOUNTER — TELEPHONE (OUTPATIENT)
Dept: INFECTIOUS DISEASES | Age: 56
End: 2021-10-15

## 2021-10-15 NOTE — TELEPHONE ENCOUNTER
pt called in requesting to review apts. Pt called into office in need of help with transport for up coming apt. .   Rn and pt reviewed additional transport options and assistance. It is determined he will use [facility] as last report for help. Aware of policy and  time. [facility] notified. All appts are related to medical treatment and are necessary for compliance    Appt:   10.19.21 at 10 with CM ID office  12.3.21 at [address]    Denies any needs at this time. Denies any issues that need Doctor attention.  Will call with concerns

## 2021-10-18 RX ORDER — ABACAVIR SULFATE, DOLUTEGRAVIR SODIUM, LAMIVUDINE 600; 50; 300 MG/1; MG/1; MG/1
TABLET, FILM COATED ORAL
Qty: 30 TABLET | Refills: 2 | Status: SHIPPED | OUTPATIENT
Start: 2021-10-18 | End: 2022-06-28 | Stop reason: SDUPTHER

## 2021-10-19 ENCOUNTER — NURSE ONLY (OUTPATIENT)
Dept: INFECTIOUS DISEASES | Age: 56
End: 2021-10-19

## 2021-10-19 NOTE — PROGRESS NOTES
Re-Assessment      Patient ID:   Vickii Gals  Date:   10/19/2021      Client ID:  [id number]    [facility] [id number] (home)     Family/House:    [] Partner  [] Children  [] Other -     Mental Health:   Hx of Depression and Anxiety    Substance Abuse:   Hx of Crack cocaine use  Social History     Socioeconomic History    Marital status: Single     Spouse name: Not on file    Number of children: Not on file    Years of education: Not on file    Highest education level: Not on file   Occupational History    Not on file   Tobacco Use    Smoking status: Current Every Day Smoker     Packs/day: 2.00     Years: 44.00     Pack years: 88.00     Types: Cigarettes    Smokeless tobacco: Never Used    Tobacco comment: 3-4 PPD   Vaping Use    Vaping Use: Never used   Substance and Sexual Activity    Alcohol use: No     Alcohol/week: 0.0 standard drinks    Drug use: Yes     Types: Cocaine, Marijuana     Comment: crack     Sexual activity: Not on file   Other Topics Concern    Not on file   Social History Narrative    Not on file     Social Determinants of Health     Financial Resource Strain:     Difficulty of Paying Living Expenses:    Food Insecurity:     Worried About Running Out of Food in the Last Year:     Ran Out of Food in the Last Year:    Transportation Needs:     Lack of Transportation (Medical):      Lack of Transportation (Non-Medical):    Physical Activity:     Days of Exercise per Week:     Minutes of Exercise per Session:    Stress:     Feeling of Stress :    Social Connections:     Frequency of Communication with Friends and Family:     Frequency of Social Gatherings with Friends and Family:     Attends Anabaptist Services:     Active Member of Clubs or Organizations:     Attends Club or Organization Meetings:     Marital Status:    Intimate Partner Violence:     Fear of Current or Ex-Partner:     Emotionally Abused:     Physically Abused:     Sexually Abused:        Housing:   homeless    Health/Healthcare:  HIV Stable   Physician Visit:  Dr. Luz Sullivan 6/21   Dental Visit:  CM encouraged follow up    CD4:   Lab Results   Component Value Date    LABABSO 424 10/14/2021       Viral Load:  Lab Results   Component Value Date    GPW2WPTQLXG 17,000 10/17/2018    [id number] Not Detected 04/15/2021       Medical Insurance:  Payor: MEDICARE / Plan: MEDICARE PART A AND B / Product Type: *No Product type* /    OHDAP/HIPSCA:  NA   Renewal Date:  NA    Income:    SSDI    Legal Issues:    NA    Emergency Contact:   Extended Emergency Contact Information  Primary Emergency Contact: [address] Phone: [phone]  Mobile Phone: [phone]  Relation: Brother/Sister  Pt presented for scheduled appointment with CM. CM completed Semi-Annual Review, no changes noted to  Part A Eligibility for services. CM reviewed Grievance Policy, Sliding Scale Fee and Transportation Policy with pt. CM completed PSA and SA/MH Assessments. Pt has hx of SA and MH. Pt has been linked to [facility] for counseling, court ordered. CM updated ISP, pt signed in agreement with POC,  Pt stated he has been compliant with meds, most recent VL is undetectable. Pt has not followed with a dentist in one year. CM encouraged follow up, explained importance of dental care. Pt is not sexually active, is aware of U=U, undetectable equals un-transmittable. CM provided socialization. Pt has been homeless since January. CM encouraged pt to request assistance with housing from [facility] [place]. Per request, CM provided food vouchers via [facility].  CM assessed need, provisions as last resort. CM will follow up with pt as needed.

## 2021-10-25 ENCOUNTER — TELEPHONE (OUTPATIENT)
Dept: INFECTIOUS DISEASES | Age: 56
End: 2021-10-25

## 2021-10-26 ENCOUNTER — TELEPHONE (OUTPATIENT)
Dept: INFECTIOUS DISEASES | Age: 56
End: 2021-10-26

## 2021-10-26 ENCOUNTER — VIRTUAL VISIT (OUTPATIENT)
Dept: INFECTIOUS DISEASES | Age: 56
End: 2021-10-26
Payer: MEDICARE

## 2021-10-26 DIAGNOSIS — B20 CURRENTLY ASYMPTOMATIC HIV INFECTION, WITH HISTORY OF HIV-RELATED ILLNESS (HCC): Primary | ICD-10-CM

## 2021-10-26 DIAGNOSIS — J44.1 COPD EXACERBATION (HCC): ICD-10-CM

## 2021-10-26 DIAGNOSIS — Z87.898 HISTORY OF CRACK COCAINE USE: ICD-10-CM

## 2021-10-26 DIAGNOSIS — R53.83 OTHER FATIGUE: Primary | ICD-10-CM

## 2021-10-26 DIAGNOSIS — Z79.2 CHRONIC ANTIBIOTIC SUPPRESSION: ICD-10-CM

## 2021-10-26 DIAGNOSIS — B20 SYMPTOMATIC HIV INFECTION (HCC): Primary | ICD-10-CM

## 2021-10-26 DIAGNOSIS — G47.00 INSOMNIA, UNSPECIFIED TYPE: ICD-10-CM

## 2021-10-26 DIAGNOSIS — Z87.891 HISTORY OF TOBACCO USE: ICD-10-CM

## 2021-10-26 DIAGNOSIS — L03.115 CELLULITIS OF RIGHT FOOT: ICD-10-CM

## 2021-10-26 DIAGNOSIS — C44.310 BASAL CELL CARCINOMA (BCC) OF SKIN OF FACE, UNSPECIFIED PART OF FACE: ICD-10-CM

## 2021-10-26 PROCEDURE — G8484 FLU IMMUNIZE NO ADMIN: HCPCS | Performed by: INTERNAL MEDICINE

## 2021-10-26 PROCEDURE — G8427 DOCREV CUR MEDS BY ELIG CLIN: HCPCS | Performed by: INTERNAL MEDICINE

## 2021-10-26 PROCEDURE — G8421 BMI NOT CALCULATED: HCPCS | Performed by: INTERNAL MEDICINE

## 2021-10-26 PROCEDURE — 3017F COLORECTAL CA SCREEN DOC REV: CPT | Performed by: INTERNAL MEDICINE

## 2021-10-26 PROCEDURE — 99214 OFFICE O/P EST MOD 30 MIN: CPT | Performed by: INTERNAL MEDICINE

## 2021-10-26 PROCEDURE — 4004F PT TOBACCO SCREEN RCVD TLK: CPT | Performed by: INTERNAL MEDICINE

## 2021-10-26 RX ORDER — ALBUTEROL SULFATE 90 UG/1
AEROSOL, METERED RESPIRATORY (INHALATION)
Qty: 1 EACH | Refills: 1 | Status: SHIPPED | OUTPATIENT
Start: 2021-10-26

## 2021-10-26 NOTE — PROGRESS NOTES
10/26/2021      Patient Name: Mook Disla  YOB: 1965  Patient Sex: male  Medical Record Number: [id number]    10/26/2021    TELEHEALTH EVALUATION -- Audio/Visual ([facility])      Mook Disla (:  1965) has requested an audio/video evaluation for the following concern(s):    HIV    Due to the COVID-19 outbreak, patient's office visit was converted to a virtual visit. Patient was contacted and agreed to proceed with a virtual visit with me via Doxy. me with my location at the office. Patient reports that they are located at home. The risks and benefits of converting to a virtual visit were discussed in light of the current infectious disease epidemic. Services were provided through an audio/video synchronous discussion virtually to substitute for in person clinic visit. THIS virtual visit was conducted via interactive/real-time audio/video. Due to this being a TeleHealth encounter, evaluation of the following organ systems is limited: Vitals/Constitutional/EENT/Resp/CV/GI//MS/Neuro/Skin/Heme-Lymph-Imm.}    Pursuant to the emergency declaration under the [address] [place] [address], [facility] waiver authority and the NERIT[employer] and [employer] Act, this Virtual Visit was conducted, with patient's consent, to reduce the patient's risk of exposure to COVID-19 and provide care for the patient. Background:    Patient was diagnosed with HIV in  in [facility]. Had thrush at the time. HIs CD4 was noted to be 328 and genotype was negative for any resistance. Started on Combivir and Sustiva but quit taking meds a few years later. In  was started on Atripla but stopped 3-4 years later. Then started taking Triumeq about 3 years ago but stopped taking that as well within a few years. He is back on triumeq and compliant with medications.     Hx of crack use and tobacco. Has had over 100 sexual partners over his lifetime - males, per patient. Hx of multiple suicide attempts,He has been institutionalized a few times for depression and suicide attempts. No STD history, he is edentulous, has a family hx of colon cancer and skin cancers. He had neck and lip squamous and basal cell cancers removed. Father had stage 4 cancer renal cancer with mets and passed away. Mother passed away as well. Moved to [facility] temporarily after his father passed away and is back as of October 2018. Got a new PCP ( Dr Estrella Shepherd )  Saw Dr Sandra Silverio for podiatry and surgery was 1/21/2019 - wound went down to bone. Persistent issues with cellulitis of the ankle. He tends to pick at the wound and ashlee abscesses himself. + MRSA hx.   Had EGD on 1/16/2019 - no cancerous findings. Has had recurrent issues with the achilles   He is on Triumeq. States that he is currently clean from drugs. Still in the shelter. Court ordered counseling On November 5th. Sees his PCP this Friday. Wants to discuss change in his sleeping pills. Sees podiatry. Asking for multivitamin, B 12, Folic acid, Vitamin D supplements. Multiple thoughts on ending his life \" one day with dignity\" no plan at present and does not intend to do anything anytime soon. Offered psychiatric counseling and asked if he needed ER - he declines. Review of Systems:  All 14 systems reviewed with patient and are negative other than as stated above.     Past Medical History  Past Medical History:   Diagnosis Date    Anxiety     Cancer ([facility]     skin  L arm    Cellulitis of heel, right 2016    after surgery x 3 on tendion     Depression     Erectile dysfunction     Headache(784.0)     HIV (human immunodeficiency virus infection) ([facility]     Hyperlipidemia     Liver disease     Osteoarthritis        Past Surgical History  Past Surgical History:   Procedure Laterality Date    CHOLECYSTECTOMY      CYST REMOVAL      R wrist x2    FOOT SURGERY      bone extraction    FRACTURE SURGERY      UPPER GASTROINTESTINAL ENDOSCOPY N/A 1/16/2019    EGD ESOPHAGOGASTRODU[facility]       Allergies  Allergies   Allergen Reactions    Methadone Other (See Comments)     Chest pain  Chest pain    Morphine Other (See Comments)     Chest pain  Chest pains    Nexium [Esomeprazole Magnesium] Nausea Only and Nausea And Vomiting     Upset stomach  Upset stomach    Omeprazole Nausea Only and Nausea And Vomiting     Upset stomach  Upset stomach       Family History  Family History   Problem Relation Age of Onset    High Blood Pressure Mother     Cancer Father        Immunization History  Immunization History   Administered Date(s) Administered    Hepatitis B 06/09/2005, 07/01/2005, 12/02/2005    Hepatitis B vaccine 06/09/2005, 07/01/2005, 12/02/2005    Influenza 10/19/2011    Influenza A (H1O1-90) Vaccine IM 11/25/2009    Influenza A (Z6M2-63) Vaccine PF IM 11/25/2009    Influenza Vaccine, unspecified formulation 10/19/2011, 09/15/2014    Influenza Whole 01/26/2011    PPD Test 01/26/2011, 03/20/2012, 06/08/2015    Pneumococcal Conjugate 13-valent ([id number]) 04/24/2019    Pneumococcal Conjugate 7-valent (Prevnar7) 03/16/2009    Pneumococcal Polysaccharide ([id number]) 02/16/2021    Td vaccine (adult) 10/19/2006    Tdap (Boostrix, Adacel) 04/24/2019    Tetanus 10/19/2006     Since we cannot conduct an in-person exam, the following were addressed with the patient to the best of my capability via virtual visit:   Patient does not perceive any new visual deficits  No diaphoresis or flushing in the face  Patient is able to flex and extend her neck with ease. Patient can inhale and exhale without any difficulty and chest seems to be expanding symmetrically. No conversational dyspnea. Patient does not feel any palpitations   Patient's  abdomen is not protruberant beyond their normal size. No new swelling of their joints.   No observed neurological changes or slurred speech when speaking to the patient    No new skin rash or ulcers  R foot wound is stable at the present time. Current labs  Lab Results   Component Value Date    TG9YLSL 440 12/17/2012    DZ3WJBA 683 06/08/2012    JF7RJWW 547 03/07/2012    JZ5SNXS 611 12/12/2011         Chemistry        Component Value Date/Time     10/14/2021 0725    K 4.4 10/14/2021 0725     10/14/2021 0725    CO2 24 10/14/2021 0725    BUN 18 10/14/2021 0725    CREATININE 1.35 (H) 10/14/2021 0725        Component Value Date/Time    CALCIUM 9.2 10/14/2021 0725    ALKPHOS 116 (H) 10/14/2021 0725    AST 15 10/14/2021 0725    ALT 13 10/14/2021 0725    BILITOT 0.4 10/14/2021 0725          No components found for: CHLPL  Lab Results   Component Value Date    TRIG 277 (H) 03/31/2020    TRIG 120 04/17/2019    TRIG 170 03/14/2018     Lab Results   Component Value Date    HDL 41 03/31/2020    HDL 44 04/17/2019    HDL 28 (L) 03/14/2018     Lab Results   Component Value Date    LDLCALC 112 03/31/2020    LDLCALC 119 04/17/2019    LDLCALC 62 03/14/2018     No results found for: LABVLDL  Lab Results   Component Value Date    HEPAIGM Non-reactive 04/24/2019    HEPBCAB NEGATIVE 12/12/2011     Lab Results   Component Value Date    RPR NON REAC 03/07/2012       Impression/Plan   HIV infection - Triumeq. Stressed compliance. Cellulitis of R foot with chronic OM - MRSA on last culture. Per patient is resolved and closed. He has been taking daily bactrim. Follows with Dr Shona Durant in [place]  Illicit drug use: Crack use - clean right now. Insomnia - will defer medications to his PCP. He discussed his inability to fall and stay asleep  Basal Cell Carcinoma/squamous cell cancer  - follow up with derm  Tobacco use history    Prevention  Substance abuse screening performed:   Crack - remission?   Counseling provided during this visit > 50% yes  Brief Mental Health Screening performed:  Yes - suicide attempts  Physical abuse screening performed: yes - none    Patient was counseled on the importance of proper handwashing, especially after handling wounds, surfaces, urine, and stool. If any diarrhea develops, patient is to refrain from the use of anti-diarrhea medications and is to call me immediately. Patient should refrain from touching their face. Questions regarding the current COVID outbreak addressed as needed.  Total time spent reviewing the contents of this chart and with patient in total 30  min        Gwen Benavides, DO

## 2021-10-26 NOTE — TELEPHONE ENCOUNTER
Call placed to pt as V/V follow up. Jaymie appt went good. New orders reviewed-- b12 and folate plus routine labs. Reorder inhaler and bactrim     Discussed compliance --- states he has been much better with medications. No issues at this time      Pt has no questions regarding apt. Medications called in Per Dr. Barnett Cure    4 month follow up able to be scheduled. Discussed the need for lab work to be completed 1 week prior to next apt. Denies any other needs or acute issues that need doctor attention.

## 2021-10-27 ENCOUNTER — TELEPHONE (OUTPATIENT)
Dept: INFECTIOUS DISEASES | Age: 56
End: 2021-10-27

## 2021-10-27 NOTE — TELEPHONE ENCOUNTER
Pt called into office in need of help with transport for up coming apt. .   Rn and pt reviewed additional transport options and assistance. It is determined he will use pegasus as last report for help. Aware of policy and  time. [facility] notified. All appts are related to medical treatment and are necessary for compliance    Appt: 11.17.21 at 915 with Dr Alysha Bell [address], [place], [address] [facility]    Denies any needs at this time. Denies any issues that need Doctor attention.  Will call with concerns

## 2021-11-16 ENCOUNTER — TELEPHONE (OUTPATIENT)
Dept: INFECTIOUS DISEASES | Age: 56
End: 2021-11-16

## 2021-11-16 NOTE — TELEPHONE ENCOUNTER
Pt called into office in need of help with transport for up coming apt. .   Rn and pt reviewed additional transport options and assistance. It is determined he will use pegasus as last report for help. Aware of policy and  time. [facility] notified. All appts are related to medical treatment and are necessary for compliance  This transportation assistance will help patient remain in medical care by enabling them to access medical and support services. appt--- 11.24.21 Dr Daryl Zamora at 945 am   Denies any needs at this time. Denies any issues that need Doctor attention.  Will call with concerns

## 2021-11-30 ENCOUNTER — TELEPHONE (OUTPATIENT)
Dept: INFECTIOUS DISEASES | Age: 56
End: 2021-11-30

## 2021-11-30 NOTE — TELEPHONE ENCOUNTER
Call placed to pt regarding need to update RW eligibility and paperwork. Reviewed all needed documentation for update. This includes proof of income ( 1 month total, most recent) proof of residency and/or ID as well as any insurance updates or changes. He has had no changes. pt is aware of the program, verbalized understanding and denies questions. FIORELLA apt made 12. 2.2021     Rn and pt reviewed additional transport options and assistance. It is determined he will use pegasus as last report for help. Aware of policy and  time. [facility] notified. All appts are related to medical treatment and are necessary for compliance  This transportation assistance will help patient remain in medical care by enabling them to access medical and support services. Appt: 12.2.2021 at 1 pm-- [facility]A apt    Denies any needs at this time. Denies any issues that need Doctor attention.  Will call with concerns

## 2021-12-03 ENCOUNTER — TELEPHONE (OUTPATIENT)
Dept: INFECTIOUS DISEASES | Age: 56
End: 2021-12-03

## 2021-12-06 ENCOUNTER — NURSE ONLY (OUTPATIENT)
Dept: INFECTIOUS DISEASES | Age: 56
End: 2021-12-06

## 2021-12-10 ENCOUNTER — TELEPHONE (OUTPATIENT)
Dept: INFECTIOUS DISEASES | Age: 56
End: 2021-12-10

## 2021-12-10 NOTE — TELEPHONE ENCOUNTER
Pt called in needing help with transport for upcoming appt. 12.21.21      Pt also wanted to discuss health concerns. States he has been sick. States since dec. 1st he has not been feeling well. Although today he is feeling better. complaints of chills, no appetite, cough, SOB at times and HA. Denies loss of taste and smell, N/V/D  Will be getting tested for COVID at [facility], he has a friend taking him. Rn and pt reviewed additional transport options and assistance. It is determined he will use pegasus as last report for help. Aware of policy and  time. [facility]asus notified. All appts are related to medical treatment and are necessary for compliance  This transportation assistance will help patient remain in medical care by enabling them to access medical and support services. Appt: 12.21.21 at 9 am -- [facility] [place] [address], [place], [address]  Denies any other needs at this time. Denies any issues that need Doctor attention.  Will call with concerns

## 2021-12-14 ENCOUNTER — TELEPHONE (OUTPATIENT)
Dept: INFECTIOUS DISEASES | Age: 56
End: 2021-12-14

## 2021-12-14 NOTE — TELEPHONE ENCOUNTER
Pt called in stating he missed his COVID test yesterday due to transport not showing up. Still stating he is feeling sick. Continuing with same symptoms. States he wears mask most of the time. Living with sister now. COVID testing R/S for     He states he can only go after 5 and not on Tuesday or Thursday   12.15.21 at 430 pm-- spoke with pt this time will work. His sister will take him. Verified date and time with sister as well.

## 2021-12-16 ENCOUNTER — TELEPHONE (OUTPATIENT)
Dept: INFECTIOUS DISEASES | Age: 56
End: 2021-12-16

## 2021-12-16 NOTE — TELEPHONE ENCOUNTER
Pt called in to talk. COVID + dx yesterday. Feeling some better today. Using inhaler sometime. HA, chills, cough at times and states taste is different but can still smell. discussed that family will need to quarantine. He verbalized understanding. Discussed need to go to ER if but become SOB or has breathing difficulty.

## 2021-12-16 NOTE — TELEPHONE ENCOUNTER
12.15.21 5 pm    Pt notified this RN of + COVID test. Dr Renetta Sanchez updated.  Will follow up with pt 12.16.21

## 2021-12-21 ENCOUNTER — TELEPHONE (OUTPATIENT)
Dept: INFECTIOUS DISEASES | Age: 56
End: 2021-12-21

## 2021-12-21 NOTE — TELEPHONE ENCOUNTER
Pt called in with update. Feeling better. Sleeping a lot. SOB is better and cough. Otherwise. Will call with update.

## 2021-12-27 ENCOUNTER — TELEPHONE (OUTPATIENT)
Dept: INFECTIOUS DISEASES | Age: 56
End: 2021-12-27

## 2021-12-27 NOTE — TELEPHONE ENCOUNTER
Per request, M scheduled transport for the following appointments:    1/7/22- P/U 8AM- Court    1/19/22- P/U 9:45AM- Dr. Enzo Vance    1/27/22- P/U 7:30AM- Dr. Maxime Zavaleta CM assessed need, made arrangements as last resort.

## 2022-01-07 ENCOUNTER — TELEPHONE (OUTPATIENT)
Dept: INFECTIOUS DISEASES | Age: 57
End: 2022-01-07

## 2022-01-07 NOTE — TELEPHONE ENCOUNTER
Pt called into office in need of help with transport for up coming apt. .   Rn and pt reviewed additional transport options and assistance. It is determined he will use pegasus as last report for help. Aware of policy and  time. [facility]asus notified. All appts are related to medical treatment and are necessary for compliance  This transportation assistance will help patient remain in medical care by enabling them to access medical and support services. Appt: 2.25.22 at 830 to [facility]    provided active listening as pt discussed legal issues and frustrations. Denies any needs at this time. Denies any issues that need Doctor attention.  Will call with concerns

## 2022-01-14 ENCOUNTER — TELEPHONE (OUTPATIENT)
Dept: INFECTIOUS DISEASES | Age: 57
End: 2022-01-14

## 2022-01-14 NOTE — TELEPHONE ENCOUNTER
Pt called into office in need of help with transport for up coming apt. .   Rn and pt reviewed additional transport options and assistance. It is determined he will use pegasus as last report for help. Aware of policy and  time. [facility]asus notified. All appts are related to medical treatment and are necessary for compliance  This transportation assistance will help patient remain in medical care by enabling them to access medical and support services. Appt: 1.18.22 at 9 am --[address], [place], [address]    Denies any needs at this time. Denies any issues that need Doctor attention.  Will call with concerns

## 2022-01-19 ENCOUNTER — NURSE ONLY (OUTPATIENT)
Dept: INFECTIOUS DISEASES | Age: 57
End: 2022-01-19

## 2022-01-19 NOTE — PROGRESS NOTES
Pt presented for scheduled appointment with CM. CM assisted with [facility] Renewal Application for medication co-pay assistance. Cm provided food vouchers, need assessed. CM will follow up with pt as needed.

## 2022-01-20 ENCOUNTER — TELEPHONE (OUTPATIENT)
Dept: INFECTIOUS DISEASES | Age: 57
End: 2022-01-20

## 2022-01-20 NOTE — TELEPHONE ENCOUNTER
Per request, CM scheduled transport to appointment with [facility] 8/20/27,  at 11:15AM.  CM assessed needs, made arrangements as last resort.

## 2022-01-24 ENCOUNTER — TELEPHONE (OUTPATIENT)
Dept: INFECTIOUS DISEASES | Age: 57
End: 2022-01-24

## 2022-01-24 NOTE — TELEPHONE ENCOUNTER
CM received call from Tanner Bello at [facility] ([facility]). Pt was found eligible for medication co-pay assistance 3/1/22 thru 8/31/22. Pt to be informed of same.

## 2022-01-31 ENCOUNTER — TELEPHONE (OUTPATIENT)
Dept: INFECTIOUS DISEASES | Age: 57
End: 2022-01-31

## 2022-01-31 NOTE — TELEPHONE ENCOUNTER
Per request, CM scheduled transport for lab work 2/1/22 @ [address] [place] [address].  CM assessed need, arrangements as last resort.

## 2022-02-01 DIAGNOSIS — R53.83 OTHER FATIGUE: ICD-10-CM

## 2022-02-01 DIAGNOSIS — B20 SYMPTOMATIC HIV INFECTION (HCC): ICD-10-CM

## 2022-02-01 LAB
ALBUMIN SERPL-MCNC: 3.7 G/DL (ref 3.5–4.6)
ALP BLD-CCNC: 122 U/L (ref 35–104)
ALT SERPL-CCNC: 15 U/L (ref 0–41)
ANION GAP SERPL CALCULATED.3IONS-SCNC: 11 MEQ/L (ref 9–15)
AST SERPL-CCNC: 16 U/L (ref 0–40)
BILIRUB SERPL-MCNC: <0.2 MG/DL (ref 0.2–0.7)
BUN BLDV-MCNC: 11 MG/DL (ref 6–20)
CALCIUM SERPL-MCNC: 8.9 MG/DL (ref 8.5–9.9)
CHLORIDE BLD-SCNC: 104 MEQ/L (ref 95–107)
CO2: 24 MEQ/L (ref 20–31)
CREAT SERPL-MCNC: 1.1 MG/DL (ref 0.7–1.2)
GFR AFRICAN AMERICAN: >60
GFR NON-AFRICAN AMERICAN: >60
GLOBULIN: 3.3 G/DL (ref 2.3–3.5)
GLUCOSE BLD-MCNC: 82 MG/DL (ref 70–99)
HCT VFR BLD CALC: 48.1 % (ref 42–52)
HEMOGLOBIN: 16.3 G/DL (ref 14–18)
HIV AG/AB: REACTIVE
MCH RBC QN AUTO: 32.2 PG (ref 27–31.3)
MCHC RBC AUTO-ENTMCNC: 34 % (ref 33–37)
MCV RBC AUTO: 94.8 FL (ref 80–100)
PDW BLD-RTO: 13.7 % (ref 11.5–14.5)
PLATELET # BLD: 224 K/UL (ref 130–400)
POTASSIUM SERPL-SCNC: 4.2 MEQ/L (ref 3.4–4.9)
RBC # BLD: 5.07 M/UL (ref 4.7–6.1)
SODIUM BLD-SCNC: 139 MEQ/L (ref 135–144)
TOTAL PROTEIN: 7 G/DL (ref 6.3–8)
WBC # BLD: 6.4 K/UL (ref 4.8–10.8)

## 2022-02-02 LAB
FOLATE: 4.9 NG/ML
HIV INTERPRETATION: ABNORMAL
HIV-1 ANTIBODY: POSITIVE
HIV-2 AB: NEGATIVE
VITAMIN B-12: 258 PG/ML (ref 232–1245)

## 2022-02-03 LAB
% CD 3 POS. LYMPH.: 78 % (ref 62–87)
ABSOLUTE CD 3: 1258 CELLS/UL (ref 570–2400)
ABSOLUTE CD 4 HELPER: 424 CELLS/UL (ref 430–1800)
ABSOLUTE CD 8 (SUPP): 831 CELLS/UL (ref 210–1200)
LYMPHOCYTE SUBSET PANEL 4 INFO: ABNORMAL

## 2022-02-04 ENCOUNTER — TELEPHONE (OUTPATIENT)
Dept: INFECTIOUS DISEASES | Age: 57
End: 2022-02-04

## 2022-02-04 NOTE — TELEPHONE ENCOUNTER
Pt called in requesting to review recent lab work. Reviewed resulted labs. error in HIV VL. Pt was HIV tested not checked for viral load. He will need to go to lab to re due this lab. Working on getting a new phone, using his sisters for now. Provided active listening as pt discussed phone issues    We discussed need for lab VL   Rn and pt reviewed additional transport options and assistance. It is determined he will use pegasus as last report for help. Discussed transport policy. Discussed need to call 2 hours prior to apt time for cancellation. Pt verbalized all understanding. [facility] notified. Pt aware transport will be there for  about 30 mins prior to apt. All appts are related to medical treatment and are necessary for compliance  This transportation assistance will help patient remain in medical care by enabling them to access medical and support services. [facility] lab 3.1.22  at 7 am     Denies any needs at this time. Denies any issues that need Doctor attention.  Will call with concerns

## 2022-02-25 ENCOUNTER — TELEPHONE (OUTPATIENT)
Dept: INFECTIOUS DISEASES | Age: 57
End: 2022-02-25

## 2022-02-25 DIAGNOSIS — B20 SYMPTOMATIC HIV INFECTION (HCC): Primary | ICD-10-CM

## 2022-02-25 NOTE — TELEPHONE ENCOUNTER
Pt called into office in need of help with transport for up coming apt. .   Rn and pt reviewed additional transport options and assistance. It is determined he will use pegasus as last report for help. Aware of policy and  time. Pegasus notified. All appts are related to medical treatment and are necessary for compliance  This transportation assistance will help patient remain in medical care by enabling them to access medical and support services. Appt: 4.8.22 Legal at 830 --- [facility]    Denies any needs at this time. Denies any issues that need Doctor attention.  Will call with concerns

## 2022-03-01 DIAGNOSIS — B20 SYMPTOMATIC HIV INFECTION (HCC): ICD-10-CM

## 2022-03-04 LAB
HIV-1 QNT LOG, IU/ML: <1.47 LOG CPY/ML
HIV-1 QNT, IU/ML: ABNORMAL CPY/ML
INTERPRETATION: DETECTED

## 2022-03-09 DIAGNOSIS — B20 HIV INFECTION, UNSPECIFIED SYMPTOM STATUS (HCC): Primary | ICD-10-CM

## 2022-03-15 ENCOUNTER — TELEPHONE (OUTPATIENT)
Dept: INFECTIOUS DISEASES | Age: 57
End: 2022-03-15

## 2022-03-15 NOTE — TELEPHONE ENCOUNTER
Pt canceled today V/V -- Internet issues. Call placed to pt to R/S to in office 3.21.22 at 945    Rn and pt reviewed additional transport options and assistance. It is determined he will use pegasus as last report for help. Aware of policy and  time. [facility]asus notified. All appts are related to medical treatment and are necessary for compliance  This transportation assistance will help patient remain in medical care by enabling them to access medical and support services. Appt: Dr Patricia King office 3.21.22 at 945 am    Denies any needs at this time. Denies any issues that need Doctor attention.  Will call with concerns

## 2022-03-15 NOTE — TELEPHONE ENCOUNTER
Pt called into office in need of help with transport for up coming apt. .   Rn and pt reviewed additional transport options and assistance. It is determined that pt will need transport assistance as last resort. Aware of policy and  time. [facility]asus notified. All appts are related to medical treatment and are necessary for compliance    This transportation assistance will help patient remain in medical care by enabling them to access medical and support services. [facility] at 930 am    Denies any needs at this time. Denies any issues that need Doctor attention.  Will call with concerns

## 2022-03-21 ENCOUNTER — OFFICE VISIT (OUTPATIENT)
Dept: INFECTIOUS DISEASES | Age: 57
End: 2022-03-21
Payer: MEDICARE

## 2022-03-21 VITALS
BODY MASS INDEX: 29.86 KG/M2 | TEMPERATURE: 97.9 F | HEART RATE: 73 BPM | DIASTOLIC BLOOD PRESSURE: 73 MMHG | WEIGHT: 202.2 LBS | SYSTOLIC BLOOD PRESSURE: 110 MMHG

## 2022-03-21 DIAGNOSIS — B20 CURRENTLY ASYMPTOMATIC HIV INFECTION, WITH HISTORY OF HIV-RELATED ILLNESS (HCC): Primary | ICD-10-CM

## 2022-03-21 DIAGNOSIS — Z87.891 HISTORY OF TOBACCO USE: ICD-10-CM

## 2022-03-21 DIAGNOSIS — Z87.898 HISTORY OF CRACK COCAINE USE: ICD-10-CM

## 2022-03-21 DIAGNOSIS — G47.00 INSOMNIA, UNSPECIFIED TYPE: ICD-10-CM

## 2022-03-21 PROCEDURE — 3017F COLORECTAL CA SCREEN DOC REV: CPT | Performed by: INTERNAL MEDICINE

## 2022-03-21 PROCEDURE — G8484 FLU IMMUNIZE NO ADMIN: HCPCS | Performed by: INTERNAL MEDICINE

## 2022-03-21 PROCEDURE — 99214 OFFICE O/P EST MOD 30 MIN: CPT | Performed by: INTERNAL MEDICINE

## 2022-03-21 PROCEDURE — G8427 DOCREV CUR MEDS BY ELIG CLIN: HCPCS | Performed by: INTERNAL MEDICINE

## 2022-03-21 PROCEDURE — 4004F PT TOBACCO SCREEN RCVD TLK: CPT | Performed by: INTERNAL MEDICINE

## 2022-03-21 PROCEDURE — G8417 CALC BMI ABV UP PARAM F/U: HCPCS | Performed by: INTERNAL MEDICINE

## 2022-03-21 NOTE — PROGRESS NOTES
3/21/2022      Patient Name: Zuleyka Hawkins  YOB: 1965  Patient Sex: male  Medical Record Number: [id number]    3/21/2022    Patient was diagnosed with HIV in 2000 in [place]. Had thrush at the time. HIs CD4 was noted to be 328 and genotype was negative for any resistance. Started on Combivir and Sustiva but quit taking meds a few years later. In 2007 was started on Atripla but stopped 3-4 years later. Then started taking Triumeq about 3 years ago but stopped taking that as well within a few years. He is back on triumeq and compliant with medications. Hx of crack use and tobacco. Has had over 100 sexual partners over his lifetime - males, per patient. Hx of multiple suicide attempts,He has been institutionalized a few times for depression and suicide attempts. No STD history, he is edentulous, has a family hx of colon cancer and skin cancers. He had neck and lip squamous and basal cell cancers removed. Father had stage 4 cancer renal cancer with mets and passed away. Mother passed away as well. Moved to [facility] temporarily after his father passed away and is back as of October 2018. Got a new PCP ( Dr Karlee Viera )  Saw Dr Cary Allison for podiatry and surgery was 1/21/2019 - wound went down to bone. Persistent issues with cellulitis of the ankle. He tends to pick at the wound and ashlee abscesses himself. + MRSA hx.   Had EGD on 1/16/2019 - no cancerous findings. Has had recurrent issues with the achilles - currently no open wounds or erythema. He tends to incise and drain his own abscesses when they happen. Encouraged not to do this. He is not taking chronic suppression with Bactrim at present for this. Occasionally will check in with podiatry    He is supposed to be on Triumeq. States that he is currently clean from drugs. He has not been taking his meds well lately. No thoughts of self harm lately. Seems content by way of speech and current conversation. Upbeat.  Talks about things that he is enjoying. General: Patient appears in no acute distress, pleasant and cooperative  Skin: no new rashes or erythema or induration  HEENT: PERRL, EOMI, MMM, Neck is supple, No cervical adenopathy  Heart: S1 S2  Lungs: clear bilaterally to auscultation  Abdomen: soft, ND, NTTP, +BS  Extrem:+pulses, no calf pain, no asymmetry of the upper or lower extremities, achilles area is currently stable. Neuro exam: CN II-XII intact, facial expressions symmertrical bilaterally, no slurred speech, muscle strength equal bilaterally            Current labs  Lab Results   Component Value Date    JD1HTFV 440 12/17/2012    MN4XXEP 683 06/08/2012    ZA0RGDC 547 03/07/2012    WQ2UUTM 611 12/12/2011         Chemistry        Component Value Date/Time     02/01/2022 0736    K 4.2 02/01/2022 0736     02/01/2022 0736    CO2 24 02/01/2022 0736    BUN 11 02/01/2022 0736    CREATININE 1.10 02/01/2022 0736        Component Value Date/Time    CALCIUM 8.9 02/01/2022 0736    ALKPHOS 122 (H) 02/01/2022 0736    AST 16 02/01/2022 0736    ALT 15 02/01/2022 0736    BILITOT <0.2 02/01/2022 0736          No components found for: CHLPL  Lab Results   Component Value Date    TRIG 180 (H) 02/01/2022    TRIG 277 (H) 03/31/2020    TRIG 120 04/17/2019     Lab Results   Component Value Date    HDL 38 (L) 02/01/2022    HDL 41 03/31/2020    HDL 44 04/17/2019     Lab Results   Component Value Date    LDLCALC 88 02/01/2022    LDLCALC 112 03/31/2020    LDLCALC 119 04/17/2019     No results found for: LABVLDL  Lab Results   Component Value Date    HEPAIGM Non-reactive 04/24/2019    HEPBCAB NEGATIVE 12/12/2011     Lab Results   Component Value Date    RPR NON REAC 03/07/2012       Impression/Plan   HIV infection - Triumeq. Stressed compliance. Cellulitis of R foot with chronic OM - MRSA on last culture. Appears non erythematous and the wound is closed. Follows with Dr Logan Cheatham in [place]  Illicit drug use: Crack use hx - clean right now. Insomnia - will defer medications to his PCP. He discussed his inability to fall and stay asleep  Basal Cell Carcinoma/squamous cell cancer  - follow up with derm  Tobacco use history    Prevention  Substance abuse screening performed:   Crack - remission? Counseling provided during this visit > 50% yes  Brief Mental Health Screening performed:  Yes - suicide attempts  Physical abuse screening performed: yes - none    Time spent today in combination of reviewing patient's chart, medications, labs, pictures when pertinent, provider communication as necessary, counseling patient, care/coordination not otherwise reported here, and patient face to face 30 min.   >50% of that time spent counseling and coordination of patient care  Patient was also appropriately counseled on preventive measures such as vaccinations, the importance of annual exam with their PCP, and the importance of health maintenance by dietary and exercise regimens. All questions were answered from an ID perspective and to the best of my ability.               Gwen Benavides, DO

## 2022-03-21 NOTE — PROGRESS NOTES
Pt here for 6 month B20 f/u. Admittedly states that he hasn't been taking his medications regularly. States that he only takes his medications, \"every now and then\" when he remembers. Pt is currently living back with his sister. Not really pleased with this situation, but is glad to have a place to stay. Pt in clinic for appointment. requesting help with basic needs. Requesting food vouchers. Needs assessed. 2 food vouchers given to pt. Denies other needs at this time and will call with concerns and/or updates.

## 2022-03-22 ENCOUNTER — TELEPHONE (OUTPATIENT)
Dept: INFECTIOUS DISEASES | Age: 57
End: 2022-03-22

## 2022-03-22 NOTE — TELEPHONE ENCOUNTER
Pt called into office in need of help with transport for up coming apt. .   Rn and pt reviewed additional transport options and assistance. It is determined that pt will need transport assistance as last resort. Aware of policy and  time. Pegasus notified. All appts are related to medical treatment and are necessary for compliance    This transportation assistance will help patient remain in medical care by enabling them to access medical and support services. Appt: 7.12.22 at [address] [facility] paperwork recieved, completed and faxed back with confirmation, pt updated. Denies any needs at this time. Denies any issues that need Doctor attention.  Will call with concerns

## 2022-04-01 ENCOUNTER — TELEPHONE (OUTPATIENT)
Dept: INFECTIOUS DISEASES | Age: 57
End: 2022-04-01

## 2022-04-01 NOTE — TELEPHONE ENCOUNTER
Pt called into office in need of help with transport for up coming apt. .   Rn and pt reviewed additional transport options and assistance. It is determined that pt will need transport assistance as last resort. Aware of policy and  time. Flora notified. All appts are related to medical treatment and are necessary for compliance    This transportation assistance will help patient remain in medical care by enabling them to access medical and support services. Appt:   4.19.22 at 4 pm -- VAN --[facility], [address] [place] [address]  4.26.22 at 8 am -- PCP -- Dr Giselle Miles -- [address] [facility],  [address]    Denies any needs at this time. Denies any issues that need Doctor attention.  Will call with concerns

## 2022-04-05 ENCOUNTER — TELEPHONE (OUTPATIENT)
Dept: INFECTIOUS DISEASES | Age: 57
End: 2022-04-05

## 2022-04-05 NOTE — TELEPHONE ENCOUNTER
Pt called into office in need of help with transport for up coming apt. .   Rn and pt reviewed additional transport options and assistance. It is determined that pt will need transport assistance as last resort. Aware of policy and  time. Pegasus notified. All appts are related to medical treatment and are necessary for compliance    This transportation assistance will help patient remain in medical care by enabling them to access medical and support services. Appt:  4.6.22 at 9 am --[address], Tom Ibrahim, [address]     Denies any needs at this time. Denies any issues that need Doctor attention.  Will call with concerns

## 2022-04-06 DIAGNOSIS — B20 HIV INFECTION, UNSPECIFIED SYMPTOM STATUS (HCC): Primary | ICD-10-CM

## 2022-04-06 DIAGNOSIS — E55.9 VITAMIN D DEFICIENCY: ICD-10-CM

## 2022-04-08 ENCOUNTER — TELEPHONE (OUTPATIENT)
Dept: INFECTIOUS DISEASES | Age: 57
End: 2022-04-08

## 2022-04-08 NOTE — TELEPHONE ENCOUNTER
Pt called into office in need of help with transport for up coming apt. .   Rn and pt reviewed additional transport options and assistance. It is determined that pt will need transport assistance as last resort. Aware of policy and  time. Jacquesus notified. All appts are related to medical treatment and are necessary for compliance    This transportation assistance will help patient remain in medical care by enabling them to access medical and support services. Appt: 4.13.22 Legal at 1 --- [facility]    Denies any needs at this time. Denies any issues that need Doctor attention.  Will call with concerns

## 2022-05-04 ENCOUNTER — TELEPHONE (OUTPATIENT)
Dept: INFECTIOUS DISEASES | Age: 57
End: 2022-05-04

## 2022-05-04 NOTE — TELEPHONE ENCOUNTER
Per request, Cm scheduled transport for housing appointment 5/9/22 @ 10AM.  Cm assessed need, arrangements as last resort.

## 2022-05-09 ENCOUNTER — TELEPHONE (OUTPATIENT)
Dept: INFECTIOUS DISEASES | Age: 57
End: 2022-05-09

## 2022-05-09 NOTE — TELEPHONE ENCOUNTER
Per request, CM scheduled transport to appointment with [facility] at [facility] 1/82/07 @ 10AM.  CM assessed need, arrangements as last resort.

## 2022-05-17 ENCOUNTER — TELEPHONE (OUTPATIENT)
Dept: INFECTIOUS DISEASES | Age: 57
End: 2022-05-17

## 2022-05-17 NOTE — TELEPHONE ENCOUNTER
Pt called in to talk. Turned apartment down- does not think he should be on his own. On and off crack still. Off meth now  Paranoid on and off when he was smoking meth  \"I did something stupid 3 weeks ago\"   provided active listening as pt discussed mental health concerns. VAN established. Phone apt today at 4 pm. (court ordered)   CM with [employer] [facility] as well. He has been feeling able to discuss things with her.

## 2022-05-18 ENCOUNTER — TELEPHONE (OUTPATIENT)
Dept: INFECTIOUS DISEASES | Age: 57
End: 2022-05-18

## 2022-05-18 NOTE — TELEPHONE ENCOUNTER
Pt called into office in need of help with transport for up coming apt. .   Rn and pt reviewed additional transport options and assistance. It is determined that pt will need transport assistance as last resort. Aware of policy and  time. Flora notified. All appts are related to medical treatment and are necessary for compliance    This transportation assistance will help patient remain in medical care by enabling them to access medical and support services. Appt: he was able to schedule a face to face at [facility] for counseling.     6.1.22 at 2 pm [address] [address]    Denies any needs at this time. Denies any issues that need Doctor attention.  Will call with concerns

## 2022-05-19 ENCOUNTER — TELEPHONE (OUTPATIENT)
Dept: INFECTIOUS DISEASES | Age: 57
End: 2022-05-19

## 2022-05-19 NOTE — TELEPHONE ENCOUNTER
Pt called into office in need of help with transport for up coming apt. .   Rn and pt reviewed additional transport options and assistance. It is determined that pt will need transport assistance as last resort. Aware of policy and  time. Flora notified. All appts are related to medical treatment and are necessary for compliance    This transportation assistance will help patient remain in medical care by enabling them to access medical and support services. Appt:   5.23.22 at 2-- [address]  5.27.22 at [address]-- Legal -- [place] [address]  7.8.22 at 65-- Dr Shefali Calvillo    Denies any needs at this time. Denies any issues that need Doctor attention.  Will call with concerns

## 2022-05-25 ENCOUNTER — TELEPHONE (OUTPATIENT)
Dept: INFECTIOUS DISEASES | Age: 57
End: 2022-05-25

## 2022-05-25 ENCOUNTER — HOSPITAL ENCOUNTER (EMERGENCY)
Age: 57
Discharge: HOME | End: 2022-05-25
Payer: MEDICARE

## 2022-05-25 VITALS
SYSTOLIC BLOOD PRESSURE: 129 MMHG | HEIGHT: 69 IN | OXYGEN SATURATION: 96 % | TEMPERATURE: 98.3 F | WEIGHT: 197 LBS | RESPIRATION RATE: 18 BRPM | DIASTOLIC BLOOD PRESSURE: 90 MMHG | BODY MASS INDEX: 29.18 KG/M2 | HEART RATE: 69 BPM

## 2022-05-25 DIAGNOSIS — R44.0 AUDITORY HALLUCINATIONS: ICD-10-CM

## 2022-05-25 DIAGNOSIS — F22 PARANOIA (HCC): Primary | ICD-10-CM

## 2022-05-25 DIAGNOSIS — F14.90 CRACK COCAINE USE: ICD-10-CM

## 2022-05-25 LAB
ACETAMINOPHEN LEVEL: <5 UG/ML (ref 10–30)
ALBUMIN SERPL-MCNC: 4.1 G/DL (ref 3.5–4.6)
ALP BLD-CCNC: 107 U/L (ref 35–104)
ALT SERPL-CCNC: 23 U/L (ref 0–41)
AMPHETAMINE SCREEN, URINE: ABNORMAL
ANION GAP SERPL CALCULATED.3IONS-SCNC: 8 MEQ/L (ref 9–15)
AST SERPL-CCNC: 19 U/L (ref 0–40)
BARBITURATE SCREEN URINE: ABNORMAL
BASOPHILS ABSOLUTE: 0 K/UL (ref 0–0.2)
BASOPHILS RELATIVE PERCENT: 0.7 %
BENZODIAZEPINE SCREEN, URINE: ABNORMAL
BILIRUB SERPL-MCNC: 0.3 MG/DL (ref 0.2–0.7)
BILIRUBIN URINE: NEGATIVE
BLOOD, URINE: NEGATIVE
BUN BLDV-MCNC: 15 MG/DL (ref 6–20)
CALCIUM SERPL-MCNC: 9.3 MG/DL (ref 8.5–9.9)
CANNABINOID SCREEN URINE: ABNORMAL
CHLORIDE BLD-SCNC: 104 MEQ/L (ref 95–107)
CHOLESTEROL, TOTAL: 165 MG/DL (ref 0–199)
CLARITY: CLEAR
CO2: 27 MEQ/L (ref 20–31)
COCAINE METABOLITE SCREEN URINE: POSITIVE
COLOR: YELLOW
CREAT SERPL-MCNC: 1.33 MG/DL (ref 0.7–1.2)
EOSINOPHILS ABSOLUTE: 0.1 K/UL (ref 0–0.7)
EOSINOPHILS RELATIVE PERCENT: 1.8 %
ETHANOL PERCENT: NORMAL G/DL
ETHANOL: <10 MG/DL (ref 0–0.08)
GFR AFRICAN AMERICAN: >60
GFR NON-AFRICAN AMERICAN: 55.8
GLOBULIN: 2.9 G/DL (ref 2.3–3.5)
GLUCOSE BLD-MCNC: 99 MG/DL (ref 70–99)
GLUCOSE URINE: NEGATIVE MG/DL
HCT VFR BLD CALC: 51.1 % (ref 42–52)
HDLC SERPL-MCNC: 34 MG/DL (ref 40–59)
HEMOGLOBIN: 17.1 G/DL (ref 14–18)
KETONES, URINE: ABNORMAL MG/DL
LDL CHOLESTEROL CALCULATED: 99 MG/DL (ref 0–129)
LEUKOCYTE ESTERASE, URINE: NEGATIVE
LYMPHOCYTES ABSOLUTE: 2.2 K/UL (ref 1–4.8)
LYMPHOCYTES RELATIVE PERCENT: 32.6 %
Lab: ABNORMAL
MCH RBC QN AUTO: 30.5 PG (ref 27–31.3)
MCHC RBC AUTO-ENTMCNC: 33.5 % (ref 33–37)
MCV RBC AUTO: 90.8 FL (ref 80–100)
METHADONE SCREEN, URINE: ABNORMAL
MONOCYTES ABSOLUTE: 0.6 K/UL (ref 0.2–0.8)
MONOCYTES RELATIVE PERCENT: 9.3 %
NEUTROPHILS ABSOLUTE: 3.7 K/UL (ref 1.4–6.5)
NEUTROPHILS RELATIVE PERCENT: 55.6 %
NITRITE, URINE: NEGATIVE
OPIATE SCREEN URINE: ABNORMAL
OXYCODONE URINE: ABNORMAL
PDW BLD-RTO: 14.6 % (ref 11.5–14.5)
PH UA: 6 (ref 5–9)
PHENCYCLIDINE SCREEN URINE: ABNORMAL
PLATELET # BLD: 178 K/UL (ref 130–400)
POTASSIUM REFLEX MAGNESIUM: 4.3 MEQ/L (ref 3.4–4.9)
PROPOXYPHENE SCREEN: ABNORMAL
PROTEIN UA: NEGATIVE MG/DL
RBC # BLD: 5.62 M/UL (ref 4.7–6.1)
SALICYLATE, SERUM: <0.3 MG/DL (ref 15–30)
SARS-COV-2, NAAT: NOT DETECTED
SODIUM BLD-SCNC: 139 MEQ/L (ref 135–144)
SPECIFIC GRAVITY UA: 1.02 (ref 1–1.03)
TOTAL CK: 78 U/L (ref 0–190)
TOTAL PROTEIN: 7 G/DL (ref 6.3–8)
TRIGL SERPL-MCNC: 161 MG/DL (ref 0–150)
TSH REFLEX: 1.5 UIU/ML (ref 0.44–3.86)
URINE REFLEX TO CULTURE: ABNORMAL
UROBILINOGEN, URINE: 1 E.U./DL
WBC # BLD: 6.7 K/UL (ref 4.8–10.8)

## 2022-05-25 PROCEDURE — 36415 COLL VENOUS BLD VENIPUNCTURE: CPT

## 2022-05-25 PROCEDURE — 80179 DRUG ASSAY SALICYLATE: CPT

## 2022-05-25 PROCEDURE — 99284 EMERGENCY DEPT VISIT MOD MDM: CPT

## 2022-05-25 PROCEDURE — 80143 DRUG ASSAY ACETAMINOPHEN: CPT

## 2022-05-25 PROCEDURE — 84443 ASSAY THYROID STIM HORMONE: CPT

## 2022-05-25 PROCEDURE — 93005 ELECTROCARDIOGRAM TRACING: CPT | Performed by: PERSONAL EMERGENCY RESPONSE ATTENDANT

## 2022-05-25 PROCEDURE — 85025 COMPLETE CBC W/AUTO DIFF WBC: CPT

## 2022-05-25 PROCEDURE — 80053 COMPREHEN METABOLIC PANEL: CPT

## 2022-05-25 PROCEDURE — 82550 ASSAY OF CK (CPK): CPT

## 2022-05-25 PROCEDURE — 81003 URINALYSIS AUTO W/O SCOPE: CPT

## 2022-05-25 PROCEDURE — 80061 LIPID PANEL: CPT

## 2022-05-25 PROCEDURE — 80307 DRUG TEST PRSMV CHEM ANLYZR: CPT

## 2022-05-25 PROCEDURE — 87635 SARS-COV-2 COVID-19 AMP PRB: CPT

## 2022-05-25 PROCEDURE — 82077 ASSAY SPEC XCP UR&BREATH IA: CPT

## 2022-05-25 ASSESSMENT — ENCOUNTER SYMPTOMS
NAUSEA: 0
VOMITING: 0
SORE THROAT: 0
COUGH: 0
RHINORRHEA: 0
DIARRHEA: 0
ABDOMINAL PAIN: 0
BLOOD IN STOOL: 0
COLOR CHANGE: 0
SHORTNESS OF BREATH: 0

## 2022-05-25 ASSESSMENT — PAIN - FUNCTIONAL ASSESSMENT: PAIN_FUNCTIONAL_ASSESSMENT: NONE - DENIES PAIN

## 2022-05-25 NOTE — TELEPHONE ENCOUNTER
Pt called in wanting to talk. States he is \"still hearing voices. I have been talking with my sister and  CM with [facility] . States he trusts them and can talk to them. admits to feeling paranoid regarding his living arrangement. Follows with VAN  Denies S/H plans   Still smoking crack. Does not believe this problem is from crack. Discussed need for VAN ESS as pt is having worsening paranoia. He refuses at this time. States he has support and has follow up to follow up on this issue.  Requested he call office with further updates, concerns or needs

## 2022-05-26 LAB
EKG ATRIAL RATE: 66 BPM
EKG P AXIS: 51 DEGREES
EKG P-R INTERVAL: 130 MS
EKG Q-T INTERVAL: 400 MS
EKG QRS DURATION: 84 MS
EKG QTC CALCULATION (BAZETT): 419 MS
EKG R AXIS: 78 DEGREES
EKG T AXIS: 57 DEGREES
EKG VENTRICULAR RATE: 66 BPM

## 2022-05-26 NOTE — ED PROVIDER NOTES
[address] [facility] ED  eMERGENCY dEPARTMENT eNCOUnter      Pt Name: Hilton Brown  MRN: [id number]  [place] 1965  Date of evaluation: 5/25/2022  Provider: NAVNEET Bang      HISTORY OF PRESENT ILLNESS    Hilton Brown is a 56 y.o. male with PMHx of anxiety, skin CA, CHF, COPD, depression, ED, HIV, hyperlipidemia, liver disease, PUD, insomnia, crack abuse, suicide attempt by drug ingestion (2016 most recent) presents to the emergency department with mental health evaluation. Per chart review, pt called ID today and told them that he was having auditory hallucinations and feeling paranoid regarding his living arrangement. He admits to still smoking crack but does not feel his mental health is due to crack use. Patient states he has a bed waiting [facility] and just needs medical clearance. He does not feel he has to tell me why he is currently here. He denies suicidal ideation, homicidal ideation. States he has auditory hallucinations and when I asked what the voices say he says \"nothing\". Denies visual hallucinations, paranoia. [facility]       Nursing Notes were reviewed. REVIEW OF SYSTEMS       Review of Systems   Constitutional: Negative for appetite change, chills and fever. HENT: Negative for congestion, rhinorrhea and sore throat. Respiratory: Negative for cough and shortness of breath. Cardiovascular: Negative for chest pain. Gastrointestinal: Negative for abdominal pain, blood in stool, diarrhea, nausea and vomiting. Genitourinary: Negative for difficulty urinating. Musculoskeletal: Negative for neck stiffness. Skin: Negative for color change and rash. Neurological: Negative for dizziness, syncope, weakness, light-headedness, numbness and headaches. Psychiatric/Behavioral: Positive for hallucinations. The patient is nervous/anxious. All other systems reviewed and are negative.             PAST MEDICAL HISTORY     Past Medical History:   Diagnosis Date    Anxiety     Cancer [facility])     skin  L arm    Cellulitis of heel, right 2016    after surgery x 3 on tendion     CHF (congestive heart failure) (HCC)     COPD (chronic obstructive pulmonary disease) (HCC)     Depression     Erectile dysfunction     Headache(784.0)     HIV (human immunodeficiency virus infection) ([address]     Hyperlipidemia     Liver disease     Osteoarthritis          SURGICAL HISTORY       Past Surgical History:   Procedure Laterality Date    CHOLECYSTECTOMY      CYST REMOVAL      R wrist x2    FOOT SURGERY      bone extraction    FRACTURE SURGERY      UPPER GASTROINTESTINAL ENDOSCOPY N/A 1/16/2019    EGD ESOPHAGOGASTRODUODENOSCOPY performed by Ramiro Hunt MD at [facility]       Previous Medications    ALBUTEROL SULFATE HFA (VENTOLIN HFA) 108 (90 BASE) MCG/ACT INHALER    INHALE 2 PUFFS BY MOUTH TWICE DAILY AS NEEDED FOR SHORTNESS OF BREATH    ERGOCALCIFEROL (ERGOCALCIFEROL) 63296 UNITS CAPSULE    Take 1 capsule by mouth once a week for 8 days    HYDROXYZINE (VISTARIL) 25 MG CAPSULE    Take by mouth    MUPIROCIN (BACTROBAN) 2 % OINTMENT    apply to affected area three times a day    PANTOPRAZOLE SODIUM (PROTONIX PO)    Take by mouth    PREGABALIN (LYRICA) 100 MG CAPSULE    Take by mouth. SULFAMETHOXAZOLE-TRIMETHOPRIM (BACTRIM DS;SEPTRA DS) 800-160 MG PER TABLET    take 1 tablet by mouth once daily    SUMATRIPTAN (IMITREX) 100 MG TABLET    take 1 tablet by mouth AT ONSET OF MIGRAINE may repeat in 2 hours. ..  (REFER TO PRESCRIPTION NOTES). SUMATRIPTAN (IMITREX) 50 MG TABLET    Take 50 mg by mouth once as needed for Migraine    TOPIRAMATE (TOPAMAX) 25 MG TABLET    Take 25 mg by mouth    TRIUMEQ 600- MG TABS    TAKE 1 TABLET BY MOUTH ONCE DAILY. STORE IN ORIGINAL BOTTLE AT ROOM TEMPERATURE.     VITAMIN D (ERGOCALCIFEROL) 1.25 MG (45031 UT) CAPS CAPSULE    Take 1 capsule by mouth once a week    ZALEPLON (SONATA) 10 MG CAPSULE Take by mouth. ALLERGIES     Methadone, Morphine, Nexium [esomeprazole magnesium], and Omeprazole    FAMILY HISTORY       Family History   Problem Relation Age of Onset    High Blood Pressure Mother     Cancer Father           SOCIAL HISTORY       Social History     Socioeconomic History    Marital status: Single     Spouse name: None    Number of children: None    Years of education: None    Highest education level: None   Occupational History    None   Tobacco Use    Smoking status: Current Every Day Smoker     Packs/day: 2.00     Years: 44.00     Pack years: 88.00     Types: Cigarettes    Smokeless tobacco: Never Used    Tobacco comment: 3-4 PPD   Vaping Use    Vaping Use: Never used   Substance and Sexual Activity    Alcohol use: No     Alcohol/week: 0.0 standard drinks    Drug use: Yes     Types: Cocaine, Marijuana (Weed)     Comment: crack daily     Sexual activity: None   Other Topics Concern    None   Social History Narrative    None     Social Determinants of Health     Financial Resource Strain:     Difficulty of Paying Living Expenses: Not on file   Food Insecurity:     Worried About Running Out of Food in the Last Year: Not on file    Mary of Food in the Last Year: Not on file   Transportation Needs:     Lack of Transportation (Medical): Not on file    Lack of Transportation (Non-Medical):  Not on file   Physical Activity:     Days of Exercise per Week: Not on file    Minutes of Exercise per Session: Not on file   Stress:     Feeling of Stress : Not on file   Social Connections:     Frequency of Communication with Friends and Family: Not on file    Frequency of Social Gatherings with Friends and Family: Not on file    Attends Voodoo Services: Not on file    Active Member of Clubs or Organizations: Not on file    Attends Club or Organization Meetings: Not on file    Marital Status: Not on file   Intimate Partner Violence:     Fear of Current or Ex-Partner: Not on file    Emotionally Abused: Not on file    Physically Abused: Not on file    Sexually Abused: Not on file   Housing Stability:     Unable to Pay for Housing in the Last Year: Not on file    Number of Places Lived in the Last Year: Not on file    Unstable Housing in the Last Year: Not on file         PHYSICAL EXAM         ED Triage Vitals [05/25/22 2051]   BP Temp Temp Source Heart Rate Resp SpO2 Height Weight   (!) 129/90 98.3 °F (36.8 °C) Temporal 69 18 96 % 5' 9\" (1.753 m) 197 lb (89.4 kg)       Physical Exam  Constitutional:       Appearance: He is well-developed. HENT:      Head: Normocephalic and atraumatic. Eyes:      Conjunctiva/sclera: Conjunctivae normal.      Pupils: Pupils are equal, round, and reactive to light. Neck:      Trachea: No tracheal deviation. Cardiovascular:      Heart sounds: Normal heart sounds. Pulmonary:      Effort: Pulmonary effort is normal. No respiratory distress. Breath sounds: Normal breath sounds. No stridor. Abdominal:      General: Bowel sounds are normal. There is no distension. Palpations: Abdomen is soft. There is no mass. Tenderness: There is no abdominal tenderness. There is no guarding or rebound. Musculoskeletal:         General: Normal range of motion. Cervical back: Normal range of motion and neck supple. Skin:     General: Skin is warm and dry. Capillary Refill: Capillary refill takes less than 2 seconds. Findings: No rash. Neurological:      Mental Status: He is alert and oriented to person, place, and time. Deep Tendon Reflexes: Reflexes are normal and symmetric. Psychiatric:         Behavior: Behavior normal.         Thought Content:  Thought content normal.         Judgment: Judgment normal.      Comments: Pacing in the room and outside the room         DIAGNOSTIC RESULTS     EKG:All EKG's are interpreted by the Emergency Department Physician who either signs or Co-signs this chart in the absence of a cardiologist.    sinus rhythm with possible PVC but appears to be artifact, rate 66, normal intervals, normal axis, no ST segment changes    RADIOLOGY:   Non-plain film images such as CT, Ultrasound and MRI are read by theradiologist. Plain radiographic images are visualized and preliminarily interpreted by the emergency physician with the below findings:    Interpretation per theRadiologist below, if available at the time of this note:    No orders to display           LABS:  Labs Reviewed   ACETAMINOPHEN LEVEL - Abnormal; Notable for the following components:       Result Value    Acetaminophen Level <5 (*)     All other components within normal limits   CBC WITH AUTO DIFFERENTIAL - Abnormal; Notable for the following components:    RDW 14.6 (*)     All other components within normal limits   COMPREHENSIVE METABOLIC PANEL W/ REFLEX TO MG FOR LOW K - Abnormal; Notable for the following components:    Anion Gap 8 (*)     CREATININE 1.33 (*)     GFR Non- 55.8 (*)     Alkaline Phosphatase 107 (*)     All other components within normal limits   LIPID PANEL - Abnormal; Notable for the following components:    Triglycerides 161 (*)     HDL 34 (*)     All other components within normal limits   SALICYLATE LEVEL - Abnormal; Notable for the following components:    Salicylate, Serum <6.7 (*)     All other components within normal limits   URINE DRUG SCREEN - Abnormal; Notable for the following components:    Cocaine Metabolite Screen, Urine POSITIVE (*)     All other components within normal limits   URINALYSIS WITH REFLEX TO CULTURE - Abnormal; Notable for the following components:    Ketones, Urine TRACE (*)     All other components within normal limits   COVID-19, RAPID   CK   ETHANOL   TSH WITH REFLEX       All other labs were within normal range or not returned as of this dictation.     EMERGENCY DEPARTMENT COURSE and DIFFERENTIAL DIAGNOSIS/MDM:   Vitals:    Vitals:    05/25/22 2051   BP: (!) 129/90 Pulse: 69   Resp: 18   Temp: 98.3 °F (36.8 °C)   TempSrc: Temporal   SpO2: 96%   Weight: 197 lb (89.4 kg)   Height: 5' 9\" (1.753 m)         MDM    Positive cocaine. Medically cleared. Patient is very agitated, just wanting to go to [facility]. Patient wants to be discharged, wait in the waiting room, and then go to [place] [address] once bed is confirmed. Standard anticipatory guidance given to patient upon discharge. Have given them a specific time frame in which to follow-up and who to follow-up with. I have also advised them that they should return to the emergency department if they get worse, or not getting better or develop any new or concerning symptoms. Patient demonstrates understanding. CRITICAL CARE TIME   Total Critical Caretime was 0 minutes, excluding separately reportable procedures. There was a high probability of clinically significant/life threatening deterioration in the patient's condition which required my urgent intervention. Procedures    FINAL IMPRESSION      1. Paranoia ([address]    2. Auditory hallucinations    3. Crack cocaine use          DISPOSITION/PLAN   DISPOSITION Decision To Discharge 05/25/2022 11:29:20 PM      PATIENT REFERRED TO:  No follow-up provider specified. DISCHARGE MEDICATIONS:  New Prescriptions    No medications on file          (Please notethat portions of this note were completed with a voice recognition program.  Efforts were made to edit the dictations but occasionally words are mis-transcribed. )    NAVNEET Malin (electronically signed)  Emergency Physician Assistant         Licha Noelma  05/25/22 7873

## 2022-05-26 NOTE — ED NOTES
Patient pacing outside of room in the hallway  Then will walk back into his room        Harsha France RN  05/25/22 5358

## 2022-05-31 NOTE — TELEPHONE ENCOUNTER
Pt called in today. [facility]. Not happy there. He is working on getting DC  He is feeling better but does not want to stay there. States \"they wont let me leave\"   Pt requested VAN number alone with PCP number. These were reviewed.     Transport canceled for tomorrow as [facility] is [facility] (per pt request)     He will call office with other needs

## 2022-06-03 NOTE — TELEPHONE ENCOUNTER
Dc home today. Feels better now. Working on setting up missed apt. Has VAN today via phone. He has new counselor with [facility]. He has scheduled apt 6.9.22 at 9 am        Rn and pt reviewed additional transport options and assistance. It is determined that pt will need transport assistance as last resort. Aware of policy and  time. Pegasus notified. All appts are related to medical treatment and are necessary for compliance  This transportation assistance will help patient remain in medical care by enabling them to access medical and support services. Appt: 6.9.22 at 9 am --Counselor with [facility]    Denies any needs at this time. Denies any issues that need Doctor attention.  Will call with concerns

## 2022-06-06 ENCOUNTER — TELEPHONE (OUTPATIENT)
Dept: INFECTIOUS DISEASES | Age: 57
End: 2022-06-06

## 2022-06-06 NOTE — TELEPHONE ENCOUNTER
Pt called into office in need of help with transport for up coming apt. .   Rn and pt reviewed additional transport options and assistance. It is determined that pt will need transport assistance as last resort. Aware of policy and  time. Pegasus notified. All appts are related to medical treatment and are necessary for compliance    This transportation assistance will help patient remain in medical care by enabling them to access medical and support services. Appt:   6.8.22 at 830 -- Legal -- [address]  6.23.22 at 0 -- PCP Dr Gordon Buck      Denies any needs at this time. Denies any issues that need Doctor attention.  Will call with concerns

## 2022-06-07 ENCOUNTER — TELEPHONE (OUTPATIENT)
Dept: INFECTIOUS DISEASES | Age: 57
End: 2022-06-07

## 2022-06-09 ENCOUNTER — TELEPHONE (OUTPATIENT)
Dept: INFECTIOUS DISEASES | Age: 57
End: 2022-06-09

## 2022-06-09 NOTE — TELEPHONE ENCOUNTER
Pt called into office in need of help with transport for up coming apt. .   Rn and pt reviewed additional transport options and assistance. It is determined that pt will need transport assistance as last resort. Aware of policy and  time. Flora notified. All appts are related to medical treatment and are necessary for compliance    This transportation assistance will help patient remain in medical care by enabling them to access medical and support services. Appt:   6.10.22 at 2 -- VAN -- [place]  7.22.22 at 1 -- legal[facility] [address]    Denies any needs at this time. Denies any issues that need Doctor attention.  Will call with concerns

## 2022-06-13 ENCOUNTER — TELEPHONE (OUTPATIENT)
Dept: INFECTIOUS DISEASES | Age: 57
End: 2022-06-13

## 2022-06-13 NOTE — TELEPHONE ENCOUNTER
Per request, CM scheduled transport for appointment with [facility] 7/00/44 @ 10AM and [email or web address].  CM assessed need, arrangements as last resort.

## 2022-06-15 ENCOUNTER — TELEPHONE (OUTPATIENT)
Dept: INFECTIOUS DISEASES | Age: 57
End: 2022-06-15

## 2022-06-15 NOTE — TELEPHONE ENCOUNTER
Per request, CM added transport to this office today after appointment with [employer] [employer]. Per request, CM provide food vouchers via Levine, Susan. \[facility].\"", need assessed. Provisions as last resort. Per request, CM scheduled transport to appointment with [facility]u Cartwright 2//27/56 @ 10:30AM.  CM assessed need, arrangements as last resort.

## 2022-06-16 ENCOUNTER — TELEPHONE (OUTPATIENT)
Dept: INFECTIOUS DISEASES | Age: 57
End: 2022-06-16

## 2022-06-16 NOTE — TELEPHONE ENCOUNTER
Per request, CM scheduled transport to appointment with [facility] 8/17/22 @ 11AM.  CM assessed need, arrangements as last resort.

## 2022-06-18 ENCOUNTER — APPOINTMENT (OUTPATIENT)
Dept: ULTRASOUND IMAGING | Age: 57
End: 2022-06-18
Payer: MEDICARE

## 2022-06-18 ENCOUNTER — HOSPITAL ENCOUNTER (EMERGENCY)
Age: 57
Discharge: HOME | End: 2022-06-18
Payer: MEDICARE

## 2022-06-18 VITALS
HEART RATE: 99 BPM | RESPIRATION RATE: 18 BRPM | TEMPERATURE: 99 F | BODY MASS INDEX: 28.8 KG/M2 | SYSTOLIC BLOOD PRESSURE: 139 MMHG | DIASTOLIC BLOOD PRESSURE: 87 MMHG | OXYGEN SATURATION: 96 % | WEIGHT: 195 LBS

## 2022-06-18 DIAGNOSIS — M79.604 BILATERAL LEG PAIN: Primary | ICD-10-CM

## 2022-06-18 DIAGNOSIS — M79.605 BILATERAL LEG PAIN: Primary | ICD-10-CM

## 2022-06-18 PROCEDURE — 93970 EXTREMITY STUDY: CPT

## 2022-06-18 PROCEDURE — 99283 EMERGENCY DEPT VISIT LOW MDM: CPT

## 2022-06-18 ASSESSMENT — ENCOUNTER SYMPTOMS
SHORTNESS OF BREATH: 0
ABDOMINAL PAIN: 0
PHOTOPHOBIA: 0
VOMITING: 0
NAUSEA: 0
COUGH: 0
WHEEZING: 0

## 2022-06-18 ASSESSMENT — PAIN SCALES - GENERAL: PAINLEVEL_OUTOF10: 4

## 2022-06-18 ASSESSMENT — PAIN DESCRIPTION - LOCATION: LOCATION: LEG

## 2022-06-18 ASSESSMENT — PAIN DESCRIPTION - ORIENTATION: ORIENTATION: RIGHT;LEFT

## 2022-06-18 ASSESSMENT — PAIN - FUNCTIONAL ASSESSMENT: PAIN_FUNCTIONAL_ASSESSMENT: 0-10

## 2022-06-18 NOTE — ED PROVIDER NOTES
[address] [facility] ED  EMERGENCY DEPARTMENT ENCOUNTER      Pt Name: Jermaine Love  MRN: [id number]  [place] 1965  Date of evaluation: 6/18/2022  Provider: Yenny Wilder, [address] [place] [address]       Chief Complaint   Patient presents with    Leg Pain     Bilateral calf pain, [id number]          HISTORY OF PRESENT ILLNESS   (Location/Symptom, Timing/Onset, Context/Setting, Quality, Duration, Modifying Factors, Severity)  Note limiting factors. Jermaine Love is a 56 y.o. male who presents to the emergency department for evaluation of bilateral calf pain x 1 month. States he really noticed It in the last 2 weeks. Pain occurs only when walking. Described as tightness in the calf. Concerned for blood clots as he has a family hx of clots. No personal hx of DVT or PE. No thinners. No recent trauma to the lower extremities. No swelling, color change, numbness tinging weakness. No fever, chills, cp, sob. Able to bear weight. No wounds. Does have hx of chronic R heel OM/abscess which he takes bactrim every other day for x several years. This wound is not worsening per patient. No recent travel, periods of immobilization, hospitalizations or surgeries. Does not drink water ever. He is an every day smoker. HPI    Nursing Notes were reviewed. REVIEW OF SYSTEMS    (2-9 systems for level 4, 10 or more for level 5)     Review of Systems   Constitutional: Negative for chills and fever. HENT: Negative for congestion. Eyes: Negative for photophobia. Respiratory: Negative for cough, shortness of breath and wheezing. Cardiovascular: Negative for chest pain and palpitations. Gastrointestinal: Negative for abdominal pain, nausea and vomiting. Genitourinary: Negative for dysuria, frequency and hematuria. Musculoskeletal: Positive for myalgias. Allergic/Immunologic: Negative for immunocompromised state. Neurological: Negative for dizziness, weakness and headaches.    All other systems reviewed and are negative. Except as noted above the remainder of the review of systems was reviewed and negative. PAST MEDICAL HISTORY     Past Medical History:   Diagnosis Date    Anxiety     Cancer ([facility] [address]     skin  L arm    Cellulitis of heel, right 2016    after surgery x 3 on tendion     CHF (congestive heart failure) (HCC)     COPD (chronic obstructive pulmonary disease) (HCC)     Depression     Erectile dysfunction     Headache(784.0)     HIV (human immunodeficiency virus infection) ([facility] [address]     Hyperlipidemia     Liver disease     Osteoarthritis          SURGICAL HISTORY       Past Surgical History:   Procedure Laterality Date    CHOLECYSTECTOMY      CYST REMOVAL      R wrist x2    FOOT SURGERY      bone extraction    FRACTURE SURGERY      UPPER GASTROINTESTINAL ENDOSCOPY N/A 1/16/2019    EGD ESOPHAGOGASTRODUODENOSCOPY performed by Anabel Palmer MD at [facility] Medication List as of 6/18/2022  7:05 PM      CONTINUE these medications which have NOT CHANGED    Details   albuterol sulfate HFA (VENTOLIN HFA) 108 (90 Base) MCG/ACT inhaler INHALE 2 PUFFS BY MOUTH TWICE DAILY AS NEEDED FOR SHORTNESS OF BREATH, Disp-1 each, R-1Normal      TRIUMEQ 600- MG TABS TAKE 1 TABLET BY MOUTH ONCE DAILY. STORE IN ORIGINAL BOTTLE AT ROOM TEMPERATURE., Disp-30 tablet, R-2Normal      sulfamethoxazole-trimethoprim (BACTRIM DS;SEPTRA DS) 800-160 MG per tablet take 1 tablet by mouth once daily, Disp-30 tablet, R-0Normal      hydrOXYzine (VISTARIL) 25 MG capsule Take by mouthHistorical Med      mupirocin (BACTROBAN) 2 % ointment apply to affected area three times a day, Historical Med      !! SUMAtriptan (IMITREX) 100 MG tablet take 1 tablet by mouth AT ONSET OF MIGRAINE may repeat in 2 hours. ..  (REFER TO PRESCRIPTION NOTES). Historical Med      zaleplon (SONATA) 10 MG capsule Take by mouth. Historical Med      pregabalin (LYRICA) 100 MG capsule Take by mouth.Historical Med      vitamin D (ERGOCALCIFEROL) 1.25 MG (92899 UT) CAPS capsule Take 1 capsule by mouth once a week, Disp-4 capsule, R-0Normal      topiramate (TOPAMAX) 25 MG tablet Take 25 mg by mouthHistorical Med      !! SUMAtriptan (IMITREX) 50 MG tablet Take 50 mg by mouth once as needed for MigraineHistorical Med      Pantoprazole Sodium (PROTONIX PO) Take by mouthHistorical Med       !! - Potential duplicate medications found. Please discuss with provider. ALLERGIES     Methadone, Morphine, Nexium [esomeprazole magnesium], and Omeprazole    FAMILY HISTORY       Family History   Problem Relation Age of Onset    High Blood Pressure Mother     Cancer Father           SOCIAL HISTORY       Social History     Socioeconomic History    Marital status: Single     Spouse name: Not on file    Number of children: Not on file    Years of education: Not on file    Highest education level: Not on file   Occupational History    Not on file   Tobacco Use    Smoking status: Current Every Day Smoker     Packs/day: 2.00     Years: 44.00     Pack years: 88.00     Types: Cigarettes    Smokeless tobacco: Never Used    Tobacco comment: 3-4 PPD   Vaping Use    Vaping Use: Never used   Substance and Sexual Activity    Alcohol use: No     Alcohol/week: 0.0 standard drinks    Drug use: Yes     Types: Cocaine, Marijuana (Weed)     Comment: crack daily     Sexual activity: Not on file   Other Topics Concern    Not on file   Social History Narrative    Not on file     Social Determinants of Health     Financial Resource Strain:     Difficulty of Paying Living Expenses: Not on file   Food Insecurity:     Worried About Running Out of Food in the Last Year: Not on file    Mary of Food in the Last Year: Not on file   Transportation Needs:     Lack of Transportation (Medical): Not on file    Lack of Transportation (Non-Medical):  Not on file   Physical Activity:     Days of Exercise per Week: Not on file  Minutes of Exercise per Session: Not on file   Stress:     Feeling of Stress : Not on file   Social Connections:     Frequency of Communication with Friends and Family: Not on file    Frequency of Social Gatherings with Friends and Family: Not on file    Attends [employer] Services: Not on file    Active Member of [address] [place] [address] or Organizations: Not on file    Attends Club or Organization Meetings: Not on file    Marital Status: Not on file   Intimate Partner Violence:     Fear of Current or Ex-Partner: Not on file    Emotionally Abused: Not on file    Physically Abused: Not on file    Sexually Abused: Not on file   Housing Stability:     Unable to Pay for Housing in the Last Year: Not on file    Number of [place] in the Last Year: Not on file    Unstable Housing in the Last Year: Not on file       SCREENINGS         [place] Coma Scale  Eye Opening: Spontaneous  Best Verbal Response: Oriented  Best Motor Response: Obeys commands  [place] Coma Scale Score: 15                     CIWA Assessment  BP: 139/87  Heart Rate: 99                 PHYSICAL EXAM    (up to 7 for level 4, 8 or more for level 5)     ED Triage Vitals [06/18/22 1752]   BP Temp Temp Source Heart Rate Resp SpO2 Height Weight   139/87 99 °F (37.2 °C) Oral 99 18 96 % -- 195 lb (88.5 kg)       Physical Exam  Constitutional:       General: He is not in acute distress. Appearance: He is well-developed. He is not ill-appearing, toxic-appearing or diaphoretic. HENT:      Head: Normocephalic and atraumatic. Nose: Nose normal.      Mouth/Throat:      Mouth: Mucous membranes are moist.   Eyes:      Pupils: Pupils are equal, round, and reactive to light. Cardiovascular:      Rate and Rhythm: Normal rate and regular rhythm. Heart sounds: No murmur heard. No friction rub. No gallop. Pulmonary:      Effort: Pulmonary effort is normal.      Breath sounds: Normal breath sounds. No wheezing, rhonchi or rales.    Abdominal: General: Bowel sounds are normal. There is no distension. Palpations: Abdomen is soft. Tenderness: There is no abdominal tenderness. There is no guarding or rebound. Musculoskeletal:         General: No swelling. Cervical back: Normal range of motion. Right lower leg: No edema. Left lower leg: No edema. Comments: Bilateral LE: mild ttp over bilateral calf. No swelling, color change, wounds, bony tenderness, obvious deformity. Sensation intact. rom intact. Strength 5/5. Pulses 2+. Skin is warm, dry to the touch. No pallor or cyanosis. Homans negative. Skin:     General: Skin is warm and dry. Capillary Refill: Capillary refill takes less than 2 seconds. Findings: No rash. Neurological:      General: No focal deficit present. Mental Status: He is alert and oriented to person, place, and time. DIAGNOSTIC RESULTS     EKG: All EKG's are interpreted by the Emergency Department Physician who either signs or Co-signs this chart in the absence of a cardiologist.      RADIOLOGY:   Non-plain film images such as CT, Ultrasound and MRI are read by the radiologist. Plain radiographic images are visualized and preliminarily interpreted by the emergency physician with the below findings:      Interpretation per the Radiologist below, if available at the time of this note:    US DUP LOWER EXTREMITIES BILATERAL VENOUS   Final Result     Normal bilateral lower extremity duplex venous ultrasound. ED BEDSIDE ULTRASOUND:   Performed by ED Physician - none    LABS:  Labs Reviewed - No data to display    All other labs were within normal range or not returned as of this dictation. EMERGENCY DEPARTMENT COURSE and DIFFERENTIAL DIAGNOSIS/MDM:   Vitals:    Vitals:    06/18/22 1752   BP: 139/87   Pulse: 99   Resp: 18   Temp: 99 °F (37.2 °C)   TempSrc: Oral   SpO2: 96%   Weight: 195 lb (88.5 kg)     MDM    Bilateral lower extremity ultrasound negative for DVT. Bilateral lower extremities are neurovascularly intact. Lower suspicion for acute arterial occlusion at this time. Patient is a heavy smoker, may be experiencing intermittent claudication. Per chart review patient had ELAINE about 1 year ago which was normal.  He was encouraged to follow-up with his primary care this week may benefit from repeat ELAINE. Patient does not drink any water may be experiencing simple muscle cramping. Encouraged to increase water intake, stretch, ice/heat, Otc magnesium supplement. REASSESSMENT          CRITICAL CARE TIME   Total Critical Care time was 0 minutes, excluding separately reportable procedures. There was a high probability of clinically significant/life threatening deterioration in the patient's condition which required my urgent intervention. CONSULTS:  None    PROCEDURES:  Unless otherwise noted below, none     Procedures        FINAL IMPRESSION      1. Bilateral leg pain          DISPOSITION/PLAN   DISPOSITION Decision To Discharge 06/18/2022 07:05:35 PM      PATIENT REFERRED TO:  Prachi Galvan, [address] Karol Swartz  [phone]    Schedule an appointment as soon as possible for a visit in 1 day      [facility]) ED  [address] [address]  [phone]  Go to   As needed, If symptoms worsen      DISCHARGE MEDICATIONS:  Discharge Medication List as of 6/18/2022  7:05 PM        Controlled Substances Monitoring:     No flowsheet data found.     (Please note that portions of this note were completed with a voice recognition program.  Efforts were made to edit the dictations but occasionally words are mis-transcribed.)    Mumtaz Martinez PA-C (electronically signed)             Mumtaz Martinez PA-C  06/18/22 5926

## 2022-06-20 ENCOUNTER — TELEPHONE (OUTPATIENT)
Dept: INFECTIOUS DISEASES | Age: 57
End: 2022-06-20

## 2022-06-22 ENCOUNTER — TELEPHONE (OUTPATIENT)
Dept: INFECTIOUS DISEASES | Age: 57
End: 2022-06-22

## 2022-06-28 RX ORDER — ABACAVIR SULFATE, DOLUTEGRAVIR SODIUM, LAMIVUDINE 600; 50; 300 MG/1; MG/1; MG/1
1 TABLET, FILM COATED ORAL DAILY
Qty: 30 TABLET | Refills: 3 | Status: SHIPPED | OUTPATIENT
Start: 2022-06-28

## 2022-07-06 DIAGNOSIS — B20 HIV INFECTION, UNSPECIFIED SYMPTOM STATUS (HCC): ICD-10-CM

## 2022-07-06 DIAGNOSIS — E55.9 VITAMIN D DEFICIENCY: ICD-10-CM

## 2022-07-06 LAB
HCT VFR BLD CALC: 50.4 % (ref 42–52)
HEMOGLOBIN: 16.9 G/DL (ref 14–18)
MCH RBC QN AUTO: 31.4 PG (ref 27–31.3)
MCHC RBC AUTO-ENTMCNC: 33.5 % (ref 33–37)
MCV RBC AUTO: 93.8 FL (ref 80–100)
PDW BLD-RTO: 17.1 % (ref 11.5–14.5)
PLATELET # BLD: 176 K/UL (ref 130–400)
RBC # BLD: 5.37 M/UL (ref 4.7–6.1)
RPR: NORMAL
WBC # BLD: 8.3 K/UL (ref 4.8–10.8)

## 2022-07-07 ENCOUNTER — TELEPHONE (OUTPATIENT)
Dept: INFECTIOUS DISEASES | Age: 57
End: 2022-07-07

## 2022-07-07 LAB
ABSOLUTE CD 4 HELPER: 350 CELLS/UL (ref 430–1800)
CD4 % HELPER T CELL: 20 % (ref 32–64)
HEPATITIS C ANTIBODY: NONREACTIVE
LYMPHOCYTE SUBSET PANEL 2 INFO: ABNORMAL
VITAMIN D 25-HYDROXY: 54.4 NG/ML

## 2022-07-08 ENCOUNTER — TELEPHONE (OUTPATIENT)
Dept: INFECTIOUS DISEASES | Age: 57
End: 2022-07-08

## 2022-07-08 LAB
C. TRACHOMATIS DNA ,URINE: NEGATIVE
N. GONORRHOEAE DNA, URINE: NEGATIVE
REASON FOR REJECTION: NORMAL
REJECTED TEST: NORMAL

## 2022-07-08 NOTE — TELEPHONE ENCOUNTER
Per request, CM scheduled transport for appointment with [facility] 7/22/22 @ 8AM.  CM assessed need, arrangements as last resort.

## 2022-07-13 ENCOUNTER — TELEPHONE (OUTPATIENT)
Dept: INFECTIOUS DISEASES | Age: 57
End: 2022-07-13

## 2022-07-13 ENCOUNTER — OFFICE VISIT (OUTPATIENT)
Dept: INFECTIOUS DISEASES | Age: 57
End: 2022-07-13
Payer: MEDICARE

## 2022-07-13 VITALS
SYSTOLIC BLOOD PRESSURE: 110 MMHG | WEIGHT: 197 LBS | HEART RATE: 60 BPM | TEMPERATURE: 97.5 F | DIASTOLIC BLOOD PRESSURE: 80 MMHG | BODY MASS INDEX: 29.09 KG/M2

## 2022-07-13 DIAGNOSIS — C44.310 BASAL CELL CARCINOMA (BCC) OF SKIN OF FACE, UNSPECIFIED PART OF FACE: ICD-10-CM

## 2022-07-13 DIAGNOSIS — Z87.898 HISTORY OF CRACK COCAINE USE: ICD-10-CM

## 2022-07-13 DIAGNOSIS — F19.10 DRUG ABUSE (HCC): ICD-10-CM

## 2022-07-13 DIAGNOSIS — Z79.2 CHRONIC ANTIBIOTIC SUPPRESSION: ICD-10-CM

## 2022-07-13 DIAGNOSIS — Z87.891 HISTORY OF TOBACCO USE: ICD-10-CM

## 2022-07-13 DIAGNOSIS — B20 HIV INFECTION, UNSPECIFIED SYMPTOM STATUS (HCC): Primary | ICD-10-CM

## 2022-07-13 PROCEDURE — 4004F PT TOBACCO SCREEN RCVD TLK: CPT | Performed by: INTERNAL MEDICINE

## 2022-07-13 PROCEDURE — G8427 DOCREV CUR MEDS BY ELIG CLIN: HCPCS | Performed by: INTERNAL MEDICINE

## 2022-07-13 PROCEDURE — G8417 CALC BMI ABV UP PARAM F/U: HCPCS | Performed by: INTERNAL MEDICINE

## 2022-07-13 PROCEDURE — 3017F COLORECTAL CA SCREEN DOC REV: CPT | Performed by: INTERNAL MEDICINE

## 2022-07-13 PROCEDURE — 99214 OFFICE O/P EST MOD 30 MIN: CPT | Performed by: INTERNAL MEDICINE

## 2022-07-13 NOTE — TELEPHONE ENCOUNTER
Per request, CM scheduled transport for appointment with PCP 7/27/22 @ 9AM.  CM assessed need, arrangements as last resort.

## 2022-07-13 NOTE — PROGRESS NOTES
Pt here for 3 month b20 f/u. States that he is 100% compliant with his HIV medication, but isnt taking his other mediations, due to adverse side effects.

## 2022-07-13 NOTE — PROGRESS NOTES
Routine b20 follow up  doing ok. Stopped all VAN meds. States they made him nauseous and not fel well. States he is doing ok and denies S/H  He does state he follows with [facility] still for counseling. encouraged pt to continue with counseling. provided active listening as pt discussed he \"thinks his family maybe adding to stress level/drug use\"      Discussed compliance. With in past 30 days pt now admits to missing ART for maybe 5-6 days. We did review his VL and CD4 from the last few draws. Pt happy with numbers. (he was surprised due to non compliance at times)  reinforced compliance and importance. Pt verbalized understanding. Discussed drug use with pt. Hx of drug abuse. Recent issues as well related to drug use. Dose not want any rehab or any help. Does not feel like he needs it. Ann Yu does crack and nothing else anymore\" does not want to quit using crack at this time. Again if pt is to change his mind referral can be discussed as needed. States he has decreased smoking cigarettes. States down to 2 PPD from 3-4 PPD. Pt not interested in stopping at this time. \"trying to cut back\"   Denies ETOH    Encouraged dental follow up. Needs assessed. 1 food voucher given to pt via [employer] other apt with pt. He has an apt with Dr Cole Kirby 7.29.22   Rn and pt reviewed additional transport options and assistance. It is determined that pt will need transport assistance as last resort. Aware of policy and  time. [employer] notified. All appts are related to medical treatment and are necessary for compliance  This transportation assistance will help patient remain in medical care by enabling them to access medical and support services. Appt: Dr Cole Kirby -- 7.29.22 at 65 am -- [facility]    3 month follow up scheduled. Per Dr Daryl Cruz can be 6 months. Will discuss with pt closer to scheduled apt. He is aware he needs lab work prior. Denies any needs at this time. Denies any issues that need Doctor attention. Will call with concerns or any issues that arise.

## 2022-07-13 NOTE — PROGRESS NOTES
7/13/2022      Patient Name: Kimberley Buenrostro  YOB: 1965  Patient Sex: male  Medical Record Number: [id number]    7/13/2022    Patient was diagnosed with HIV in 2000 in [facility]. Had thrush at the time. Started on Combivir and Sustiva but quit taking meds a few years later. In 2007 was started on Atripla but stopped 3-4 years later. Then started taking Triumeq about 3 years ago but has been inconsistent with this. Hx of crack use and tobacco. MSM. Hx of multiple suicide attempts,He has been institutionalized a few times for depression and suicide attempts. Ava, has a family hx of colon cancer and skin cancers. He had neck and lip squamous and basal cell cancers removed. Is being evaluated by his pcp ( Dr Mynor Fraser ) and dermatilogy for new possibly cancerous skin lesions. This is ongoing. Multiple members of his family have passed away and he still mourns this from time to time. Has a chronic wound on the foot that went down to bone - now on chronic abx suppression due to chronic OM of the area. Occasionally flares. He lances himself. Occasionally will follow with podiatry. + MRSA hx. No thoughts of self harm lately. Was on crystal meth - last use was 2 months ago. Last crack use was 4 days ago. Inconsistent antiretroviral use. Labs reviewed with patient    General: Patient appears in no acute distress, pleasant and cooperative, ambulating well. No flare in the foot. No new worrisome skin lesions that are not currently under evaluation. Skin: no new rashes  HEENT:Neck is supple, no slurred speech, no ulcers of the gums  Heart: S1 S2  Lungs: clear bilaterally  Abdomen: soft, ND, NTTP, +BS  Extrem: no asymmetry of the upper or lower extremities, achilles area is currently stable.    Neuro exam: CN II-XII intact    Current labs  Lab Results   Component Value Date    QH2AHZB 440 12/17/2012    CS2NTYF 683 06/08/2012    AA5DMZJ 547 03/07/2012    HB8OFLR 611 12/12/2011 Chemistry        Component Value Date/Time     05/25/2022 2139    K 4.3 05/25/2022 2139     05/25/2022 2139    CO2 27 05/25/2022 2139    BUN 15 05/25/2022 2139    CREATININE 1.33 (H) 05/25/2022 2139        Component Value Date/Time    CALCIUM 9.3 05/25/2022 2139    ALKPHOS 107 (H) 05/25/2022 2139    AST 19 05/25/2022 2139    ALT 23 05/25/2022 2139    BILITOT 0.3 05/25/2022 2139          No components found for: CHLPL  Lab Results   Component Value Date    TRIG 161 (H) 05/25/2022    TRIG 180 (H) 02/01/2022    TRIG 277 (H) 03/31/2020     Lab Results   Component Value Date    HDL 34 (L) 05/25/2022    HDL 38 (L) 02/01/2022    HDL 41 03/31/2020     Lab Results   Component Value Date    LDLCALC 99 05/25/2022    LDLCALC 88 02/01/2022    LDLCALC 112 03/31/2020     No results found for: LABVLDL  Lab Results   Component Value Date    HEPAIGM Non-reactive 04/24/2019    HEPBCAB NEGATIVE 12/12/2011    HEPCAB NONREACTIVE 07/06/2022     Lab Results   Component Value Date    RPR NON REAC 03/07/2012       Impression/Plan   HIV infection - Triumeq. Stressed compliance. Cellulitis of R foot with chronic OM - MRSA on last culture. Appears non erythematous and the wound is closed. Illicit drug use: Crack use hx, crystal meth hx - was depressed about the loss of his family. Counseling obtained. Insomnia - will defer medications to his PCP.  Currently able to sleep  Basal Cell Carcinoma/squamous cell cancer  - follow up with derm  Tobacco use history    Prevention  Substance abuse screening performed:   Crack - remission; recent crystal meth   Counseling provided during this visit > 50% yes  Brief Mental Health Screening performed:  Yes - suicide attempts in the past. None recently  Physical abuse screening performed: yes - none    Time spent today in combination of reviewing patient's chart, medications, labs, pictures when pertinent, provider communication as necessary, counseling patient, care/coordination not otherwise reported here, and patient face to face 30 min.   >50% of that time spent counseling and coordination of patient care  Patient was also appropriately counseled on preventive measures such as vaccinations, the importance of annual exam with their PCP, and the importance of health maintenance by dietary and exercise regimens. All questions were answered from an ID perspective and to the best of my ability.               Gwen Benavides, DO

## 2022-07-27 ENCOUNTER — TELEPHONE (OUTPATIENT)
Dept: INFECTIOUS DISEASES | Age: 57
End: 2022-07-27

## 2022-09-19 ENCOUNTER — TELEPHONE (OUTPATIENT)
Dept: INFECTIOUS DISEASES | Age: 57
End: 2022-09-19

## 2022-09-19 NOTE — TELEPHONE ENCOUNTER
pt called into office in need of help with transport for up coming apt. .     Rn and pt reviewed additional transport options and assistance. It is determined that pt will need transport assistance as last resort. Aware of policy and  time. [employer] notified. All appts are related to medical treatment and are necessary for compliance    This transportation assistance will help patient remain in medical care by enabling them to access medical and support services. Appt:   10.26.22 at 1 Dr Richie Rosario    Denies any needs at this time. Denies any issues that need Doctor attention.  Will call with concerns

## 2022-09-20 ENCOUNTER — TELEPHONE (OUTPATIENT)
Dept: INFECTIOUS DISEASES | Age: 57
End: 2022-09-20

## 2022-10-04 ENCOUNTER — TELEPHONE (OUTPATIENT)
Dept: INFECTIOUS DISEASES | Age: 57
End: 2022-10-04

## 2022-10-04 DIAGNOSIS — B20 HIV INFECTION, UNSPECIFIED SYMPTOM STATUS (HCC): Primary | ICD-10-CM

## 2022-10-04 NOTE — TELEPHONE ENCOUNTER
Pt called into office in need of help with transport for up coming apt. .     Rn and pt reviewed additional transport options and assistance. It is determined that pt will need transport assistance as last resort. Aware of policy and  time. [place] notified. All appts are related to medical treatment and are necessary for compliance    This transportation assistance will help patient remain in medical care by enabling them to access medical and support services. Appt:   Dr Anrold Holman 11.9.22 at 10  CM 10.10.22 at 1 -- in office ODAP  11.3.22 V/V dr Mary Ann Catherine will complete labs this week or next. Discussed compliance. Pt has missed doses. On and off for about 1 week or so per pt. Reinforced importance of compliance. He verbalized understanding. Denies any needs at this time. Denies any issues that need Doctor attention.  Will call with concerns

## 2022-10-10 ENCOUNTER — NURSE ONLY (OUTPATIENT)
Dept: INFECTIOUS DISEASES | Age: 57
End: 2022-10-10

## 2022-10-10 DIAGNOSIS — B20 HIV INFECTION, UNSPECIFIED SYMPTOM STATUS (HCC): ICD-10-CM

## 2022-10-10 LAB
ALBUMIN SERPL-MCNC: 4.7 G/DL (ref 3.5–4.6)
ALP BLD-CCNC: 94 U/L (ref 35–104)
ALT SERPL-CCNC: 21 U/L (ref 0–41)
ANION GAP SERPL CALCULATED.3IONS-SCNC: 12 MEQ/L (ref 9–15)
AST SERPL-CCNC: 22 U/L (ref 0–40)
BILIRUB SERPL-MCNC: 0.7 MG/DL (ref 0.2–0.7)
BUN BLDV-MCNC: 16 MG/DL (ref 6–20)
CALCIUM SERPL-MCNC: 9.8 MG/DL (ref 8.5–9.9)
CHLORIDE BLD-SCNC: 98 MEQ/L (ref 95–107)
CO2: 29 MEQ/L (ref 20–31)
CREAT SERPL-MCNC: 1.15 MG/DL (ref 0.7–1.2)
GFR AFRICAN AMERICAN: >60
GFR NON-AFRICAN AMERICAN: >60
GLOBULIN: 3.7 G/DL (ref 2.3–3.5)
GLUCOSE BLD-MCNC: 95 MG/DL (ref 70–99)
HCT VFR BLD CALC: 52 % (ref 42–52)
HEMOGLOBIN: 17.4 G/DL (ref 14–18)
MCH RBC QN AUTO: 31 PG (ref 27–31.3)
MCHC RBC AUTO-ENTMCNC: 33.4 % (ref 33–37)
MCV RBC AUTO: 92.7 FL (ref 80–100)
PDW BLD-RTO: 13.6 % (ref 11.5–14.5)
PLATELET # BLD: 177 K/UL (ref 130–400)
POTASSIUM SERPL-SCNC: 4.7 MEQ/L (ref 3.4–4.9)
RBC # BLD: 5.61 M/UL (ref 4.7–6.1)
SODIUM BLD-SCNC: 139 MEQ/L (ref 135–144)
TOTAL PROTEIN: 8.4 G/DL (ref 6.3–8)
WBC # BLD: 7.4 K/UL (ref 4.8–10.8)

## 2022-10-10 NOTE — PROGRESS NOTES
Pt presented for scheduled appointment with CM. [facility]. Cm provided socialization, no specific issues discussed. CM provided food voucher, need assessed. Cm will follow up with pt as needed.

## 2022-10-11 LAB
ABSOLUTE CD 4 HELPER: 348 CELLS/UL (ref 430–1800)
CD4 % HELPER T CELL: 18 % (ref 32–64)
LYMPHOCYTE SUBSET PANEL 2 INFO: ABNORMAL

## 2022-10-13 LAB
HIV QUANT LOG: 4.3 LOG CPY/ML
HIV-1 RNA BY PCR, QN: ABNORMAL CPY/ML
HIV-1 RNA BY PCR, QN: DETECTED
SOURCE: ABNORMAL

## 2022-10-19 ENCOUNTER — TELEPHONE (OUTPATIENT)
Dept: INFECTIOUS DISEASES | Age: 57
End: 2022-10-19

## 2022-10-19 NOTE — TELEPHONE ENCOUNTER
CM received e-mail from Iker Guevara at [address]. Pt found eligible for [facility] assistance with medication co-pay payments. RN and pt to be informed of same.

## 2022-10-19 NOTE — TELEPHONE ENCOUNTER
Call placed to pt to discuss ODAP approval and lab work. Pt has been on and off of medications. Aware of need for 100% compliance. Pt paid 10 co pay for last month meds. He will call if issues arise with odap. Pt needs in office apt no V/V   Reviewed schedule  Apt 11.1.22 F/F    Rn and pt reviewed additional transport options and assistance. It is determined that pt will need transport assistance as last resort. Aware of policy and  time. [employer] notified. All appts are related to medical treatment and are necessary for compliance    This transportation assistance will help patient remain in medical care by enabling them to access medical and support services. Appt: Dr Cyndi Mariano 11.1.22 at 26 in office      Also provided listening and support as pt discussed moving process. He will be moving end of month. [employer] has helped him with housing. He will update office with new address and other changes as soon as he can. Denies any needs at this time. Denies any issues that need Doctor attention.  Will call with concerns

## 2022-10-28 ENCOUNTER — TELEPHONE (OUTPATIENT)
Dept: INFECTIOUS DISEASES | Age: 57
End: 2022-10-28

## 2022-10-28 NOTE — TELEPHONE ENCOUNTER
Pt called in requesting help with basic needs. Requesting [employer] for clothing and house hold items. Voucher completed     Pt into office. Voucher given to pt. Pt will be getting his own home. Hoping for next week. Pt will call with other issues or concerns.

## 2022-11-01 ENCOUNTER — TELEPHONE (OUTPATIENT)
Dept: INFECTIOUS DISEASES | Age: 57
End: 2022-11-01

## 2022-11-01 ENCOUNTER — OFFICE VISIT (OUTPATIENT)
Dept: INFECTIOUS DISEASES | Age: 57
End: 2022-11-01

## 2022-11-01 VITALS
WEIGHT: 210 LBS | SYSTOLIC BLOOD PRESSURE: 116 MMHG | DIASTOLIC BLOOD PRESSURE: 76 MMHG | BODY MASS INDEX: 31.01 KG/M2 | TEMPERATURE: 98.5 F | HEART RATE: 70 BPM

## 2022-11-01 DIAGNOSIS — R53.83 OTHER FATIGUE: ICD-10-CM

## 2022-11-01 DIAGNOSIS — Z87.891 HISTORY OF TOBACCO USE: ICD-10-CM

## 2022-11-01 DIAGNOSIS — Z87.898 HISTORY OF CRACK COCAINE USE: ICD-10-CM

## 2022-11-01 DIAGNOSIS — F32.A DEPRESSION, UNSPECIFIED DEPRESSION TYPE: ICD-10-CM

## 2022-11-01 DIAGNOSIS — B20 HIV INFECTION, UNSPECIFIED SYMPTOM STATUS (HCC): Primary | ICD-10-CM

## 2022-11-01 DIAGNOSIS — F19.10 DRUG ABUSE (HCC): ICD-10-CM

## 2022-11-01 DIAGNOSIS — Z79.2 CHRONIC ANTIBIOTIC SUPPRESSION: ICD-10-CM

## 2022-11-01 PROCEDURE — 99214 OFFICE O/P EST MOD 30 MIN: CPT | Performed by: INTERNAL MEDICINE

## 2022-11-01 RX ORDER — SULFAMETHOXAZOLE AND TRIMETHOPRIM 800; 160 MG/1; MG/1
1 TABLET ORAL
Qty: 20 TABLET | Refills: 5 | Status: SHIPPED | OUTPATIENT
Start: 2022-11-02 | End: 2023-08-09

## 2022-11-01 NOTE — PROGRESS NOTES
Pt here for routine B20 appointment. Doing well. Compliant with medications? No    Flu vaccine? No. refused    Pneumonia vacc? completed    Hep B? Completed    Smoking? Yes     Etoh? No    Drugs? Yes      PCP? Yes  Dr. Isaak Bhakta    Working? No     Housing?  stable    Dental?    OBGYN: N/A    Eligibility date:  [place] policy date:       U=U and mental health were both discussed at today's appointment. Pt refuses mental health referral, and verbalizes understanding of U=U. Pt to contact office with any questions or concerns. Phone number to office provided at checkout.

## 2022-11-01 NOTE — PROGRESS NOTES
Routine b20 apt  Discussed compliance. Pt will restart his meds next week once he has moved. Again we reviewed importance. He is to call with start date, labs 4 weeks after. Handicap placard script written and signed by Dr Donald Bruno and given to pt. RW  Smoking still 1 PPD  No etoh  Meth daily. Does not want to stop  Encouraged dental follow up. States it has been awhile. Refuses mental health referral. No longer goes to [facility] or has MH issues per pt  Pnemo/hep b vaccines completed  Refused flu vaccine today  Transport policy reviewed as he does need assistance.    TB/hep c screened x 1 sx dx -- yes   PCP- dr Anitra Proctor  No partner and NSA

## 2022-11-01 NOTE — TELEPHONE ENCOUNTER
Pt called into office in need of help with transport for up coming apt. .     Rn and pt reviewed additional transport options and assistance. It is determined that pt will need transport assistance as last resort. Aware of policy and  time. [employer] notified. All appts are related to medical treatment and are necessary for compliance    This transportation assistance will help patient remain in medical care by enabling them to access medical and support services. Appt: 11.2.22 at [facility]    Denies any needs at this time. Denies any issues that need Doctor attention.  Will call with concerns

## 2022-11-01 NOTE — PROGRESS NOTES
Patient Name: Efraín Floyd  YOB: 1965  Patient Sex: male  Medical Record Number: [id number]    11/1/2022    Patient was diagnosed with HIV in 2000 in [facility]. Had thrush at the time. Started on Combivir and Sustiva but quit taking meds a few years later. In 2007 was started on Atripla but stopped 3-4 years later. Then started taking Triumeq about 3 years ago but has been inconsistent with this. Hx of crack use and tobacco. MSM. Hx of multiple suicide attempts,He has been institutionalized a few times for depression and suicide attempts. Edentulous, has a family hx of colon cancer and skin cancers. He had neck and lip squamous and basal cell cancers removed. Is being evaluated by his pcp ( Dr Julian Kim ) and dermatilogy for new possibly cancerous skin lesions. This is ongoing. Multiple members of his family have passed away and he still mourns this from time to time. Has a chronic wound on the foot that went down to bone - now on chronic abx suppression due to chronic OM of the area. Occasionally flares. He lances himself. Occasionally will follow with podiatry. + MRSA hx. No thoughts of self harm lately. Was on crystal meth - last use was 2 months ago. Last crack use was 4 days ago. Inconsistent antiretroviral use. Doesn't want them right now. Depressed - it is that time of year with death in family that affects him until end of January. Discussed that he is interested in surgical intervention of his foot. He will be making an appointment when he is ready    Plans to \"get high\" to cope with his depression. Not suicidal. Offered counseling and other options but he refuses. +rashes on his back - mainly due to sweating. Adult acne. Uses hibiclens and wants a refill of Bactrim. Labs reviewed with patient    General: Patient appears in no acute distress, pleasant and cooperative, ambulating well. No flare in the foot. Adult acne - torso, mainly in areas of sweat. HEENT:Neck is supple, no slurred speech, no ulcers of the gums  Heart: S1 S2  Lungs: clear bilaterally  Abdomen: soft, ND, NTTP, +BS  Extrem: no asymmetry of the upper or lower extremities, achilles area is currently stable. Neuro exam: CN II-XII intact    Current labs  Lab Results   Component Value Date    RR5JTWI 440 12/17/2012    BH1FXSO 683 06/08/2012    SX4ACXL 547 03/07/2012    AS1HYVB 611 12/12/2011         Chemistry        Component Value Date/Time     10/10/2022 1234    K 4.7 10/10/2022 1234    K 4.3 05/25/2022 2139    CL 98 10/10/2022 1234    CO2 29 10/10/2022 1234    BUN 16 10/10/2022 1234    CREATININE 1.15 10/10/2022 1234        Component Value Date/Time    CALCIUM 9.8 10/10/2022 1234    ALKPHOS 94 10/10/2022 1234    AST 22 10/10/2022 1234    ALT 21 10/10/2022 1234    BILITOT 0.7 10/10/2022 1234          No components found for: CHLPL  Lab Results   Component Value Date    TRIG 161 (H) 05/25/2022    TRIG 180 (H) 02/01/2022    TRIG 277 (H) 03/31/2020     Lab Results   Component Value Date    HDL 34 (L) 05/25/2022    HDL 38 (L) 02/01/2022    HDL 41 03/31/2020     Lab Results   Component Value Date    LDLCALC 99 05/25/2022    LDLCALC 88 02/01/2022    LDLCALC 112 03/31/2020     No results found for: LABVLDL  Lab Results   Component Value Date    HEPAIGM Non-reactive 04/24/2019    HEPBCAB NEGATIVE 12/12/2011    HEPCAB NONREACTIVE 07/06/2022     Lab Results   Component Value Date    RPR NON REAC 03/07/2012       Impression/Plan   HIV infection - Triumeq. Stressed compliance. He is off of his medications right now. Cellulitis of R foot with chronic OM - no cellulitis. Hx of MRSA in this area. Currently this is stable and the wound is closed. Illicit drug use: Crack use hx, crystal meth hx - annual depression about the loss of his family. Counseling offered. Insomnia - will defer medications to his PCP.  Currently able to sleep  Basal Cell Carcinoma/squamous cell cancer  - follow up with derm  Tobacco use history  Depression - this is seasonal and annual. He has been offered counseling but refuses. We have offered rehab in the past but he has made it clear that everything will be on his terms. We continue to offer    Prevention  Substance abuse screening performed:   Crack - remission; recent crystal meth   Counseling provided during this visit > 50% yes  Brief Mental Health Screening performed:  Yes - suicide attempts in the past. None recently. Denies suicidal ideation. Physical abuse screening performed: yes - none    Time spent today in combination of reviewing patient's chart, medications, labs, pictures when pertinent, provider communication as necessary, counseling patient, care/coordination not otherwise reported here, and patient face to face 30 min.   >50% of that time spent counseling and coordination of patient care  Patient was also appropriately counseled on preventive measures such as vaccinations, the importance of annual exam with their PCP, and the importance of health maintenance by dietary and exercise regimens. All questions were answered from an ID perspective and to the best of my ability.               Gwen Benavides, DO

## 2022-11-07 ENCOUNTER — TELEPHONE (OUTPATIENT)
Dept: INFECTIOUS DISEASES | Age: 57
End: 2022-11-07

## 2022-11-07 NOTE — TELEPHONE ENCOUNTER
Pt called into office in need of help with transport for up coming apt. .     Rn and pt reviewed additional transport options and assistance. It is determined that pt will need transport assistance as last resort. Aware of policy and  time. Pegasus notified. All appts are related to medical treatment and are necessary for compliance    This transportation assistance will help patient remain in medical care by enabling them to access medical and support services. Appt: 11.8.22 at [facility]    Denies any needs at this time. Denies any issues that need Doctor attention.  Will call with concerns

## 2022-11-11 ENCOUNTER — TELEPHONE (OUTPATIENT)
Dept: INFECTIOUS DISEASES | Age: 57
End: 2022-11-11

## 2022-11-11 NOTE — TELEPHONE ENCOUNTER
Per request, CM scheduled transport to appointment with PCP 11/22/22 @ 2:15PM.  CM assessed need, arrangements as last resort.

## 2022-11-13 ASSESSMENT — PATIENT HEALTH QUESTIONNAIRE - PHQ9
SUM OF ALL RESPONSES TO PHQ QUESTIONS 1-9: 2
7. TROUBLE CONCENTRATING ON THINGS, SUCH AS READING THE NEWSPAPER OR WATCHING TELEVISION: 1
SUM OF ALL RESPONSES TO PHQ QUESTIONS 1-9: 5
2. FEELING DOWN, DEPRESSED OR HOPELESS: 1
1. LITTLE INTEREST OR PLEASURE IN DOING THINGS: 1
4. FEELING TIRED OR HAVING LITTLE ENERGY: 1
SUM OF ALL RESPONSES TO PHQ9 QUESTIONS 1 & 2: 2
1. LITTLE INTEREST OR PLEASURE IN DOING THINGS: 1
2. FEELING DOWN, DEPRESSED OR HOPELESS: 1
5. POOR APPETITE OR OVEREATING: 0
2. FEELING DOWN, DEPRESSED OR HOPELESS: 1
SUM OF ALL RESPONSES TO PHQ QUESTIONS 1-9: 2
6. FEELING BAD ABOUT YOURSELF - OR THAT YOU ARE A FAILURE OR HAVE LET YOURSELF OR YOUR FAMILY DOWN: 0
SUM OF ALL RESPONSES TO PHQ9 QUESTIONS 1 & 2: 2
SUM OF ALL RESPONSES TO PHQ QUESTIONS 1-9: 2
SUM OF ALL RESPONSES TO PHQ QUESTIONS 1-9: 2
3. TROUBLE FALLING OR STAYING ASLEEP: 1
9. THOUGHTS THAT YOU WOULD BE BETTER OFF DEAD, OR OF HURTING YOURSELF: 0
8. MOVING OR SPEAKING SO SLOWLY THAT OTHER PEOPLE COULD HAVE NOTICED. OR THE OPPOSITE, BEING SO FIGETY OR RESTLESS THAT YOU HAVE BEEN MOVING AROUND A LOT MORE THAN USUAL: 0
SUM OF ALL RESPONSES TO PHQ QUESTIONS 1-9: 5
SUM OF ALL RESPONSES TO PHQ QUESTIONS 1-9: 2
SUM OF ALL RESPONSES TO PHQ9 QUESTIONS 1 & 2: 2
SUM OF ALL RESPONSES TO PHQ QUESTIONS 1-9: 2
SUM OF ALL RESPONSES TO PHQ QUESTIONS 1-9: 2
1. LITTLE INTEREST OR PLEASURE IN DOING THINGS: 1
SUM OF ALL RESPONSES TO PHQ QUESTIONS 1-9: 5
SUM OF ALL RESPONSES TO PHQ QUESTIONS 1-9: 5
10. IF YOU CHECKED OFF ANY PROBLEMS, HOW DIFFICULT HAVE THESE PROBLEMS MADE IT FOR YOU TO DO YOUR WORK, TAKE CARE OF THINGS AT HOME, OR GET ALONG WITH OTHER PEOPLE: 1
SUM OF ALL RESPONSES TO PHQ QUESTIONS 1-9: 2

## 2022-11-14 DIAGNOSIS — J44.1 COPD EXACERBATION (HCC): ICD-10-CM

## 2022-11-14 RX ORDER — ALBUTEROL SULFATE 90 UG/1
AEROSOL, METERED RESPIRATORY (INHALATION)
Qty: 1 EACH | Refills: 1 | Status: SHIPPED | OUTPATIENT
Start: 2022-11-14 | End: 2022-11-16 | Stop reason: SDUPTHER

## 2022-11-16 DIAGNOSIS — J44.1 COPD EXACERBATION (HCC): ICD-10-CM

## 2022-11-16 RX ORDER — ALBUTEROL SULFATE 90 UG/1
AEROSOL, METERED RESPIRATORY (INHALATION)
Qty: 1 EACH | Refills: 1 | Status: SHIPPED | OUTPATIENT
Start: 2022-11-16 | End: 2022-11-21 | Stop reason: SDUPTHER

## 2022-11-21 ENCOUNTER — TELEPHONE (OUTPATIENT)
Dept: INFECTIOUS DISEASES | Age: 57
End: 2022-11-21

## 2022-11-21 DIAGNOSIS — J44.1 COPD EXACERBATION (HCC): ICD-10-CM

## 2022-11-21 DIAGNOSIS — B20 HIV INFECTION, UNSPECIFIED SYMPTOM STATUS (HCC): Primary | ICD-10-CM

## 2022-11-21 RX ORDER — ALBUTEROL SULFATE 90 UG/1
AEROSOL, METERED RESPIRATORY (INHALATION)
Qty: 1 EACH | Refills: 1 | Status: SHIPPED | OUTPATIENT
Start: 2022-11-21

## 2022-11-21 NOTE — TELEPHONE ENCOUNTER
Call placed to pt regarding need to update RW eligibility and paperwork. Reviewed all needed documentation for update. This includes proof of income ( 1 month total, most recent) proof of residency and/or ID as well as any insurance updates or changes. Will bring updated income if available. pt is aware of the program, verbalized understanding and denies questions. FIORELLA apt made 12. 1.22 via phone    Reviewed upcoming Dr Katelyn Ybarra apt. Will discuss in office and V/V once schedule is available. Needs labs    Rn and pt reviewed additional transport options and assistance. It is determined that pt will need transport assistance as last resort. Aware of policy and  time. [facility]asus notified. All appts are related to medical treatment and are necessary for compliance  This transportation assistance will help patient remain in medical care by enabling them to access medical and support services. Appt: 12.14.22 at [facility] lab    Denies any needs at this time. Denies any issues that need Doctor attention.  Will call with concerns

## 2022-11-22 ENCOUNTER — TELEPHONE (OUTPATIENT)
Dept: INFECTIOUS DISEASES | Age: 57
End: 2022-11-22

## 2022-11-22 NOTE — TELEPHONE ENCOUNTER
Per request, CM scheduled transport to appointment with PCP 12/2/22 @ 1PM.  Pt cancelled transport scheduled today due to illness. CM assessed need, arrangements as last resort.

## 2022-12-01 ENCOUNTER — NURSE ONLY (OUTPATIENT)
Dept: INFECTIOUS DISEASES | Age: 57
End: 2022-12-01

## 2022-12-01 NOTE — PROGRESS NOTES
Re-Assessment      Patient ID:   Mel Grullon  Date:   12/1/2022      Client ID:  [id number]    Iker Hilario  [address] [place] [address] [phone] (home)     Family/House:    [] Partner  [] Children  [] Other -     Mental Health:   Hx of Depression    Substance Abuse:   Current crack use  Social History     Socioeconomic History    Marital status: Single     Spouse name: Not on file    Number of children: Not on file    Years of education: Not on file    Highest education level: Not on file   Occupational History    Not on file   Tobacco Use    Smoking status: Every Day     Packs/day: 2.00     Years: 44.00     Pack years: 88.00     Types: Cigarettes    Smokeless tobacco: Never    Tobacco comments:     3-4 PPD   Vaping Use    Vaping Use: Never used   Substance and Sexual Activity    Alcohol use: No     Alcohol/week: 0.0 standard drinks    Drug use: Yes     Types: Cocaine, Marijuana (Weed)     Comment: crack daily     Sexual activity: Not on file   Other Topics Concern    Not on file   Social History Narrative    Not on file     Social Determinants of Health     Financial Resource Strain: Not on file   Food Insecurity: Not on file   Transportation Needs: Not on file   Physical Activity: Not on file   Stress: Not on file   Social Connections: Not on file   Intimate Partner Violence: Not on file   Housing Stability: Not on file       Housing:   apartment    Health/Healthcare:  HIV Stable   Physician Visit:  Dr. Charles Goodell 11/1/22   Dental Visit:  2-3 years ago    CD4:   Lab Results   Component Value Date/Time    LABABSO 348 10/10/2022 12:34 PM    LABABSO 1258 02/01/2022 07:34 AM       Viral Load:  Lab Results   Component Value Date/Time    CSL8BUXCQDS DETECTED 10/10/2022 12:34 PM    LRR7TGGMQV 19,800 10/10/2022 12:34 PM    UGW8AHHARH Not Detected 07/06/2022 07:51 AM       Medical Insurance:  Payor: / No coverage found.    OHDAP/HIPSCA:  X   Renewal Date:  4/23    Income:    SSDI    Legal Issues:

## 2022-12-07 ENCOUNTER — TELEPHONE (OUTPATIENT)
Dept: INFECTIOUS DISEASES | Age: 57
End: 2022-12-07

## 2022-12-07 NOTE — TELEPHONE ENCOUNTER
Per request, Cm scheduled transport for appointment with Dr. Alfred Torres at [facility] on 12/9/22 @ 11AM.  CM assessed need, arrangements as last resort.

## 2022-12-14 DIAGNOSIS — B20 HIV INFECTION, UNSPECIFIED SYMPTOM STATUS (HCC): ICD-10-CM

## 2022-12-14 LAB
ALBUMIN SERPL-MCNC: 4 G/DL (ref 3.5–4.6)
ALP BLD-CCNC: 96 U/L (ref 35–104)
ALT SERPL-CCNC: 16 U/L (ref 0–41)
ANION GAP SERPL CALCULATED.3IONS-SCNC: 7 MEQ/L (ref 9–15)
AST SERPL-CCNC: 16 U/L (ref 0–40)
BILIRUB SERPL-MCNC: 0.3 MG/DL (ref 0.2–0.7)
BUN BLDV-MCNC: 7 MG/DL (ref 6–20)
CALCIUM SERPL-MCNC: 9.2 MG/DL (ref 8.5–9.9)
CHLORIDE BLD-SCNC: 104 MEQ/L (ref 95–107)
CO2: 30 MEQ/L (ref 20–31)
CREAT SERPL-MCNC: 1.14 MG/DL (ref 0.7–1.2)
GFR SERPL CREATININE-BSD FRML MDRD: >60 ML/MIN/{1.73_M2}
GLOBULIN: 3 G/DL (ref 2.3–3.5)
GLUCOSE BLD-MCNC: 75 MG/DL (ref 70–99)
HCT VFR BLD CALC: 50.2 % (ref 42–52)
HEMOGLOBIN: 16.8 G/DL (ref 14–18)
MCH RBC QN AUTO: 30.7 PG (ref 27–31.3)
MCHC RBC AUTO-ENTMCNC: 33.5 % (ref 33–37)
MCV RBC AUTO: 91.5 FL (ref 79–92.2)
PDW BLD-RTO: 15.1 % (ref 11.5–14.5)
PLATELET # BLD: 171 K/UL (ref 130–400)
POTASSIUM SERPL-SCNC: 4 MEQ/L (ref 3.4–4.9)
RBC # BLD: 5.49 M/UL (ref 4.7–6.1)
SODIUM BLD-SCNC: 141 MEQ/L (ref 135–144)
TOTAL PROTEIN: 7 G/DL (ref 6.3–8)
WBC # BLD: 5.7 K/UL (ref 4.8–10.8)

## 2022-12-16 LAB
ABSOLUTE CD 4 HELPER: 421 CELLS/UL (ref 430–1800)
CD4 % HELPER T CELL: 22 % (ref 32–64)
HIV-1 RNA BY PCR, QN: NOT DETECTED
LYMPHOCYTE SUBSET PANEL 2 INFO: ABNORMAL
SOURCE: NORMAL

## 2022-12-27 ENCOUNTER — TELEPHONE (OUTPATIENT)
Dept: INFECTIOUS DISEASES | Age: 57
End: 2022-12-27

## 2022-12-27 NOTE — TELEPHONE ENCOUNTER
schedule change    Call placed to pt to discuss V/V option. Apt R/S to 12.29.22 in office at 6        Rn and pt reviewed additional transport options and assistance. It is determined that pt will need transport assistance as last resort. Aware of policy and  time. Pegasus notified. All appts are related to medical treatment and are necessary for compliance  this transportation assistance will help patient remain in medical care by enabling them to access medical and support services. Appt: 12.29.22 at 6 Dr Pepe rGeer    Denies any needs at this time. Denies any issues that need Doctor attention.  Will call with concerns

## 2022-12-29 ENCOUNTER — TELEPHONE (OUTPATIENT)
Dept: INFECTIOUS DISEASES | Age: 57
End: 2022-12-29

## 2022-12-29 NOTE — TELEPHONE ENCOUNTER
Pt called in to R/S apt. Prefers to stay with Dr Charisma Maxwell. Requesting apt in feb. With her. R.S 2.14.23 with Dr Nicanor Santillan and pt reviewed additional transport options and assistance. It is determined that pt will need transport assistance as last resort. Aware of policy and  time. Pegasus notified. All appts are related to medical treatment and are necessary for compliance    This transportation assistance will help patient remain in medical care by enabling them to access medical and support services. Dr Katelyn Ybarra 2.14.23 at 1 pm    Denies any needs at this time. Denies any issues that need Doctor attention.  Will call with concerns

## 2023-02-08 ENCOUNTER — TELEPHONE (OUTPATIENT)
Dept: INFECTIOUS DISEASES | Age: 58
End: 2023-02-08

## 2023-02-08 NOTE — TELEPHONE ENCOUNTER
Pt called into office in need of help with transport for up coming apt. .     Rn and pt reviewed additional transport options and assistance. It is determined that pt will need transport assistance as last resort. Aware of policy and  time. [place]us notified. All appts are related to medical treatment and are necessary for compliance    This transportation assistance will help patient remain in medical care by enabling them to access medical and support services. Appt: Dr Krystin Orlando 3.3.23 at [address] from-- [facility]    Denies any needs at this time. Denies any issues that need Doctor attention.  Will call with concerns

## 2023-02-14 ENCOUNTER — OFFICE VISIT (OUTPATIENT)
Dept: INFECTIOUS DISEASES | Age: 58
End: 2023-02-14
Payer: MEDICARE

## 2023-02-14 VITALS
WEIGHT: 201 LBS | BODY MASS INDEX: 29.68 KG/M2 | DIASTOLIC BLOOD PRESSURE: 84 MMHG | SYSTOLIC BLOOD PRESSURE: 120 MMHG | TEMPERATURE: 98.1 F | HEART RATE: 75 BPM

## 2023-02-14 DIAGNOSIS — Z86.14 HISTORY OF MRSA INFECTION: ICD-10-CM

## 2023-02-14 DIAGNOSIS — Z79.2 CHRONIC ANTIBIOTIC SUPPRESSION: ICD-10-CM

## 2023-02-14 DIAGNOSIS — Z87.898 HISTORY OF CRACK COCAINE USE: ICD-10-CM

## 2023-02-14 DIAGNOSIS — F32.A DEPRESSION, UNSPECIFIED DEPRESSION TYPE: ICD-10-CM

## 2023-02-14 DIAGNOSIS — Z87.891 HISTORY OF TOBACCO USE: ICD-10-CM

## 2023-02-14 DIAGNOSIS — B20 CURRENTLY ASYMPTOMATIC HIV INFECTION, WITH HISTORY OF HIV-RELATED ILLNESS (HCC): Primary | ICD-10-CM

## 2023-02-14 DIAGNOSIS — F19.10 DRUG ABUSE (HCC): ICD-10-CM

## 2023-02-14 PROCEDURE — 99214 OFFICE O/P EST MOD 30 MIN: CPT | Performed by: INTERNAL MEDICINE

## 2023-02-14 ASSESSMENT — PATIENT HEALTH QUESTIONNAIRE - PHQ9
SUM OF ALL RESPONSES TO PHQ QUESTIONS 1-9: 2
SUM OF ALL RESPONSES TO PHQ9 QUESTIONS 1 & 2: 2
2. FEELING DOWN, DEPRESSED OR HOPELESS: 1
SUM OF ALL RESPONSES TO PHQ QUESTIONS 1-9: 2
1. LITTLE INTEREST OR PLEASURE IN DOING THINGS: 1
SUM OF ALL RESPONSES TO PHQ QUESTIONS 1-9: 2
SUM OF ALL RESPONSES TO PHQ QUESTIONS 1-9: 2

## 2023-02-14 NOTE — PROGRESS NOTES
Pt here for routine B20 appointment. Doing well. Is getting used to living alone in his new space. Doesn't care fir living alone, but states he is managing. Compliant with medications? yes    Flu vaccine? No and no    Pneumonia vacc? Had previously     Smoking? yes    Etoh? no    Drugs? yes     PCP? yes    Working? Housing? stable    Dental? 2 years ago. Encouraged to follow up     OBGYN: n/a    Eligibility date:  Grievance policy date:       U=U and mental health were both discussed at today's appointment. Pt denies any issues with current mental health, and verbalizes understanding of U=U. Pt to contact office with any questions or concerns. Phone number to office provided at checkout.

## 2023-02-14 NOTE — PROGRESS NOTES
Routine b20\  Meds-- on and off. Compliance reinforced.   Reviewed labs  Smoking 2 ppd  Drinking- denies  Drug use- current crack. Last use this past weekend.   MH-- refuses referral or follow up.   Dental- 2 years. Encouraged follow up.   Eye -- last year oct 2022  Refuses colonoscopy or home test  Refuses flu/covid or meningitis vaccines  Does not want hep a/b updated

## 2023-02-14 NOTE — PROGRESS NOTES
Patient Name: Aaron Ross  YOB: 1965  Patient Sex: male  Medical Record Number: [id number]    2/14/2023    Patient was diagnosed with HIV in 2000 in [facility]. Had thrush at the time. Started on Combivir and Sustiva but quit taking meds a few years later. In 2007 was started on Atripla but stopped 3-4 years later. Then started taking Triumeq about 3 years ago but has been inconsistent with this. Hx of crack use and tobacco. MSM. Hx of multiple suicide attempts,He has been institutionalized a few times for depression and suicide attempts. Edentulous, has a family hx of colon cancer and skin cancers. He had neck and lip squamous and basal cell cancers removed. Is being evaluated by his pcp ( Dr Samuel Kennedy ) and dermatilogy for new possibly cancerous skin lesions. This is ongoing. Multiple members of his family have passed away and he still mourns this from time to time. Has a chronic wound on the foot that went down to bone - now on chronic abx suppression due to chronic OM of the area. Occasionally flares. He lances himself. Occasionally will follow with podiatry. + MRSA hx. Currently not bothering him. Relapsing and remitting drug use. Depression that is seasonal in the winter due to deaths in the family usually until the end of January. +rashes on his back - mainly due to sweating. Adult acne. Uses hibiclens and he is on Bactrim for this and his foot. We discussed his living arrangements - got a nice apartment but apprehensive about living alone due to unsteadiness and health concerns. We discussed an alert button necklace that he can wear around his neck. Labs reviewed with patient    General: Patient appears in no acute distress, pleasant and cooperative, ambulating well. No flare in the foot. Adult acne - torso, mainly in areas of sweat.    HEENT:Neck is supple, no slurred speech, no ulcers of the gums  Heart: S1 S2  Lungs: clear bilaterally  Abdomen: soft, ND, NTTP, +BS  Extrem: no asymmetry of the upper or lower extremities, achilles area is currently stable.   Neuro exam: CN II-XII intact    Current labs  Lab Results   Component Value Date    PH1LGRG 440 12/17/2012    UK6WQYR 683 06/08/2012    EW1YIDH 547 03/07/2012    NH9VYNQ 611 12/12/2011         Chemistry        Component Value Date/Time     12/14/2022 1054    K 4.0 12/14/2022 1054    K 4.3 05/25/2022 2139     12/14/2022 1054    CO2 30 12/14/2022 1054    BUN 7 12/14/2022 1054    CREATININE 1.14 12/14/2022 1054        Component Value Date/Time    CALCIUM 9.2 12/14/2022 1054    ALKPHOS 96 12/14/2022 1054    AST 16 12/14/2022 1054    ALT 16 12/14/2022 1054    BILITOT 0.3 12/14/2022 1054          No components found for: CHLPL  Lab Results   Component Value Date    TRIG 161 (H) 05/25/2022    TRIG 180 (H) 02/01/2022    TRIG 277 (H) 03/31/2020     Lab Results   Component Value Date    HDL 34 (L) 05/25/2022    HDL 38 (L) 02/01/2022    HDL 41 03/31/2020     Lab Results   Component Value Date    LDLCALC 99 05/25/2022    LDLCALC 88 02/01/2022    LDLCALC 112 03/31/2020     No results found for: LABVLDL  Lab Results   Component Value Date    HEPAIGM Non-reactive 04/24/2019    HEPBCAB NEGATIVE 12/12/2011    HEPCAB NONREACTIVE 07/06/2022     Lab Results   Component Value Date    RPR NON REAC 03/07/2012       Impression/Plan   HIV infection - Triumeq.   Recurrent cellulitis of R foot with chronic OM - no cellulitis right now. Wound closed.   Hx of MRSA Illicit drug use: Crack use hx, crystal meth hx - annual depression about the loss of his family. Counseling offered.   Elevated triglycerides - defer to PCP for management. Can try fish oil in the interim.   Insomnia hx  Basal Cell Carcinoma/squamous cell cancer  - follows up with derm  Tobacco use history  Depression - this is seasonal and annual. He has been offered counseling but refuses.     Encouraged the alert button - patient to check into this if he is  concerned about living alone. Prevention  Substance abuse screening performed:   Crack - remission; recent crystal meth   Counseling provided during this visit > 50% yes  Brief Mental Health Screening performed:  Yes - suicide attempts in the past. None recently. Denies suicidal ideation. Physical abuse screening performed: yes - none    Time spent today in combination of reviewing patient's chart, medications, labs, pictures when pertinent, provider communication as necessary, counseling patient, care/coordination not otherwise reported here, and patient face to face 36 min.   >50% of that time spent counseling and coordination of patient care  Patient was also appropriately counseled on preventive measures such as vaccinations, the importance of annual exam with their PCP, and the importance of health maintenance by dietary and exercise regimens. All questions were answered from an ID perspective and to the best of my ability.         Gwen Benavides, DO

## 2023-03-01 ENCOUNTER — TELEPHONE (OUTPATIENT)
Dept: INFECTIOUS DISEASES | Age: 58
End: 2023-03-01

## 2023-03-01 NOTE — TELEPHONE ENCOUNTER
Pt called into office in need of help with transport for up coming apt. .     Rn and pt reviewed additional transport options and assistance. It is determined that pt will need transport assistance as last resort. Aware of policy and  time. [facility]asus notified. All appts are related to medical treatment and are necessary for compliance    This transportation assistance will help patient remain in medical care by enabling them to access medical and support services. Appt: 3.3.23 Dr Narda Bolivar at [facility]    Denies any needs at this time. Denies any issues that need Doctor attention.  Will call with concerns

## 2023-03-03 ENCOUNTER — TELEPHONE (OUTPATIENT)
Dept: INFECTIOUS DISEASES | Age: 58
End: 2023-03-03

## 2023-03-03 NOTE — TELEPHONE ENCOUNTER
Pt came into clinic requesting help with basic needs. Requesting food voucher  Needs assessed. 1 food voucher given to pt. Denies other needs at this time and will call with concerns and/or updates    Pt brought in social security paper that was sent via fax to [facility] with [facility] to [phone]. Pt states that he recently started using \"Life alert\". Pt was wearing the life alert pendant while he was in office. Doctor updated with this information. Pt called in requesting help with upcoming appointment. Nurse and pt reviewed additional transport options and assistance, and is determined pt will use pegasus as last resort for help. Aware of policy and  time. Pegasus notified. All appointments are related to medical treatment and are necessary for complinace. Appt: 5/3/23 @8:00am           [address] [place], [address]    Denies any needs at this time. Denies any issues that need doctor attention. Will call office is has any other concerns.

## 2023-03-09 ENCOUNTER — TELEPHONE (OUTPATIENT)
Dept: INFECTIOUS DISEASES | Age: 58
End: 2023-03-09

## 2023-03-09 NOTE — TELEPHONE ENCOUNTER
Pt called into office in need of help with transport for up coming apt. .     Rn and pt reviewed additional transport options and assistance. It is determined that pt will need transport assistance as last resort. Aware of policy and  time. Pegasus notified. All appts are related to medical treatment and are necessary for compliance    This transportation assistance will help patient remain in medical care by enabling them to access medical and support services. Appt: 3.27.23 [facility] at 2 pm    Denies any needs at this time. Denies any issues that need Doctor attention.  Will call with concerns

## 2023-03-10 ENCOUNTER — TELEPHONE (OUTPATIENT)
Dept: INFECTIOUS DISEASES | Age: 58
End: 2023-03-10

## 2023-03-10 NOTE — TELEPHONE ENCOUNTER
Per request, CM scheduled CM appointment for assistance with [facility] Renewal Application. CM scheduled appointment for 3/27/23 @ 1PM.  Per request, CM scheduled transport. CM assessed need, arrangements as last resort.

## 2023-03-20 ENCOUNTER — TELEPHONE (OUTPATIENT)
Dept: INFECTIOUS DISEASES | Age: 58
End: 2023-03-20

## 2023-03-20 NOTE — TELEPHONE ENCOUNTER
Per request, CM scheduled transport to appointment with [facility] 9/[birth year]@ 9:45AM.  CM assessed need, arrangements as last resort.

## 2023-03-27 ENCOUNTER — NURSE ONLY (OUTPATIENT)
Dept: INFECTIOUS DISEASES | Age: 58
End: 2023-03-27

## 2023-04-05 DIAGNOSIS — B20 CURRENTLY ASYMPTOMATIC HIV INFECTION, WITH HISTORY OF HIV-RELATED ILLNESS (HCC): Primary | ICD-10-CM

## 2023-04-19 ENCOUNTER — TELEPHONE (OUTPATIENT)
Dept: INFECTIOUS DISEASES | Age: 58
End: 2023-04-19

## 2023-04-19 NOTE — TELEPHONE ENCOUNTER
Per request, CM scheduled transport to appointment with [facility] 4/21/23 @ 8AM.  CM assessed need, arrangements as last resort. Pt did inform CM he will fax RES verification for RW Eligibility and OHDAP Application. CM will follow up with pt as needed.

## 2023-04-20 ENCOUNTER — TELEPHONE (OUTPATIENT)
Dept: INFECTIOUS DISEASES | Age: 58
End: 2023-04-20

## 2023-04-20 NOTE — TELEPHONE ENCOUNTER
Pt called into office in need of help with transport for up coming apt. .     Rn and pt reviewed additional transport options and assistance. It is determined that pt will need transport assistance as last resort. Aware of policy and  time. Flora notified. All appts are related to medical treatment and are necessary for compliance    This transportation assistance will help patient remain in medical care by enabling them to access medical and support services. Appt: ADD ON   Pt scheduled to go to [facility] 4.21.23 at 8. Add on for him to go to [employer] after then home. Denies any needs at this time. Denies any issues that need Doctor attention.  Will call with concerns

## 2023-05-03 ENCOUNTER — TELEPHONE (OUTPATIENT)
Dept: INFECTIOUS DISEASES | Age: 58
End: 2023-05-03

## 2023-05-03 NOTE — TELEPHONE ENCOUNTER
Pt into clinic asking to discuss DNR CC VS CCA  We reviewed this at length. He was able to explain the difference back. He denied questions. Has will and POA in process with [facility]  PCP for DNR signature. requesting help with basic needs. Requesting food/gas voucher  Needs assessed. 1 food voucher given to pt via [facility]        Denies any needs at this time. Denies any issues that need Doctor attention.  Will call with concerns

## 2023-05-24 ENCOUNTER — TELEPHONE (OUTPATIENT)
Dept: INFECTIOUS DISEASES | Age: 58
End: 2023-05-24

## 2023-05-24 NOTE — TELEPHONE ENCOUNTER
Call placed to pt as appt reminder. He  also needs lab work completed. Reviewed labs ordered. Pt is not taking his ART. This has been for about 2 months now. Pt has known non compliance and Dr Christiano Awad is aware. We discussed importance. He requested reschedule. Prefers Dr Christiano Awad only. Other ID partners were reviewed. Has neuro apt but cant remember when. \"Not feeling it today\"    R/S to sept.  Pt is to call with update and if need for transport

## 2023-06-29 ENCOUNTER — TELEPHONE (OUTPATIENT)
Dept: INFECTIOUS DISEASES | Age: 58
End: 2023-06-29

## 2023-07-04 DIAGNOSIS — J44.1 COPD EXACERBATION (HCC): ICD-10-CM

## 2023-07-05 RX ORDER — ALBUTEROL SULFATE 90 UG/1
AEROSOL, METERED RESPIRATORY (INHALATION)
Qty: 6.7 G | Refills: 1 | Status: SHIPPED | OUTPATIENT
Start: 2023-07-05

## 2023-07-05 RX ORDER — ABACAVIR SULFATE, DOLUTEGRAVIR SODIUM, LAMIVUDINE 600; 50; 300 MG/1; MG/1; MG/1
TABLET, FILM COATED ORAL
Qty: 30 TABLET | Refills: 5 | Status: SHIPPED | OUTPATIENT
Start: 2023-07-05

## 2023-07-17 ENCOUNTER — TELEPHONE (OUTPATIENT)
Dept: INFECTIOUS DISEASES | Age: 58
End: 2023-07-17

## 2023-07-17 NOTE — TELEPHONE ENCOUNTER
Pt called in to review apts. Follow up Dr Evelyne Gonsalez October 2023  PCP apt           Rn and pt reviewed additional transport options and assistance. It is determined that pt will need transport assistance as last resort. Aware of policy and  time. Pegasus notified. All appts are related to medical treatment and are necessary for compliance    This transportation assistance will help patient remain in medical care by enabling them to access medical and support services. Appt:   Dr Evelyne Gonsalez - 10.3.23 at 930  After ID apt- SS office then home  Dr Jose Rai -- 8/10/23 at 21 [phone]    Denies any needs at this time. Denies any issues that need Doctor attention.  Will call with concerns

## 2023-07-27 ENCOUNTER — TELEPHONE (OUTPATIENT)
Dept: INFECTIOUS DISEASES | Age: 58
End: 2023-07-27

## 2023-07-27 NOTE — TELEPHONE ENCOUNTER
Pt called in to update phone number and [facility] Reviewed with Dr Norlene Gilford. Letter signed. Pt requested it to be faxed to [phone]    Faxed with confirmation. Pt updated.

## 2023-08-03 ENCOUNTER — TELEPHONE (OUTPATIENT)
Dept: INFECTIOUS DISEASES | Age: 58
End: 2023-08-03

## 2023-08-03 NOTE — TELEPHONE ENCOUNTER
Pt called into office in need of help with transport for up coming apt. .     Rn and pt reviewed additional transport options and assistance. It is determined that pt will need transport assistance as last resort. Aware of policy and  time. Flora notified. All appts are related to medical treatment and are necessary for compliance    This transportation assistance will help patient remain in medical care by enabling them to access medical and support services. Appt:   8.18.23- [facility] lab  at 11  8.18.23 add on after labs - income  - [address]    11.3.23 Dr Connor Krause at 8 am Odalys Gutierrez    Discussed compliance. States he is doing ok. Denies any needs at this time. Denies any issues that need Doctor attention.  Will call with concerns

## 2023-08-09 ENCOUNTER — TELEPHONE (OUTPATIENT)
Dept: INFECTIOUS DISEASES | Age: 58
End: 2023-08-09

## 2023-08-10 ENCOUNTER — OFFICE VISIT (OUTPATIENT)
Dept: PRIMARY CARE | Facility: CLINIC | Age: 58
End: 2023-08-10
Payer: MEDICARE

## 2023-08-10 VITALS
HEIGHT: 69 IN | WEIGHT: 201 LBS | OXYGEN SATURATION: 98 % | HEART RATE: 66 BPM | RESPIRATION RATE: 16 BRPM | BODY MASS INDEX: 29.77 KG/M2 | DIASTOLIC BLOOD PRESSURE: 64 MMHG | SYSTOLIC BLOOD PRESSURE: 110 MMHG

## 2023-08-10 DIAGNOSIS — Z00.00 ENCOUNTER FOR MEDICARE ANNUAL WELLNESS EXAM: Primary | ICD-10-CM

## 2023-08-10 DIAGNOSIS — Z71.89 ACP (ADVANCE CARE PLANNING): ICD-10-CM

## 2023-08-10 DIAGNOSIS — F33.9 DEPRESSION, RECURRENT (CMS-HCC): ICD-10-CM

## 2023-08-10 DIAGNOSIS — E78.5 DYSLIPIDEMIA: ICD-10-CM

## 2023-08-10 DIAGNOSIS — R51.9 CHRONIC NONINTRACTABLE HEADACHE, UNSPECIFIED HEADACHE TYPE: ICD-10-CM

## 2023-08-10 DIAGNOSIS — E04.1 THYROID NODULE: ICD-10-CM

## 2023-08-10 DIAGNOSIS — I73.9 PVD (PERIPHERAL VASCULAR DISEASE) (CMS-HCC): ICD-10-CM

## 2023-08-10 DIAGNOSIS — J44.1 CHRONIC OBSTRUCTIVE PULMONARY DISEASE WITH ACUTE EXACERBATION (MULTI): ICD-10-CM

## 2023-08-10 DIAGNOSIS — F41.1 GENERALIZED ANXIETY DISORDER: ICD-10-CM

## 2023-08-10 DIAGNOSIS — D45 POLYCYTHEMIA VERA (MULTI): ICD-10-CM

## 2023-08-10 DIAGNOSIS — R91.1 PULMONARY NODULE: ICD-10-CM

## 2023-08-10 DIAGNOSIS — G89.29 CHRONIC NONINTRACTABLE HEADACHE, UNSPECIFIED HEADACHE TYPE: ICD-10-CM

## 2023-08-10 DIAGNOSIS — J01.00 ACUTE MAXILLARY SINUSITIS, RECURRENCE NOT SPECIFIED: ICD-10-CM

## 2023-08-10 PROBLEM — R44.1 VISUAL HALLUCINATIONS: Status: ACTIVE | Noted: 2023-08-10

## 2023-08-10 PROBLEM — K21.9 GASTROESOPHAGEAL REFLUX DISEASE: Status: ACTIVE | Noted: 2023-08-10

## 2023-08-10 PROBLEM — M86.9: Status: RESOLVED | Noted: 2023-08-10 | Resolved: 2023-08-10

## 2023-08-10 PROBLEM — M86.9: Status: ACTIVE | Noted: 2023-08-10

## 2023-08-10 PROBLEM — M46.26 OSTEOMYELITIS OF LUMBAR SPINE (MULTI): Status: ACTIVE | Noted: 2023-08-10

## 2023-08-10 PROBLEM — R44.0 AUDITORY HALLUCINATIONS: Status: ACTIVE | Noted: 2023-08-10

## 2023-08-10 PROBLEM — M46.26 OSTEOMYELITIS OF LUMBAR SPINE (MULTI): Status: RESOLVED | Noted: 2023-08-10 | Resolved: 2023-08-10

## 2023-08-10 PROBLEM — G47.00 INSOMNIA: Status: ACTIVE | Noted: 2023-08-10

## 2023-08-10 PROBLEM — E53.8 VITAMIN B12 DEFICIENCY: Status: ACTIVE | Noted: 2023-08-10

## 2023-08-10 PROBLEM — J44.9 COPD (CHRONIC OBSTRUCTIVE PULMONARY DISEASE) (MULTI): Status: ACTIVE | Noted: 2023-08-10

## 2023-08-10 PROBLEM — F41.9 ANXIETY DISORDER: Status: ACTIVE | Noted: 2023-08-10

## 2023-08-10 PROBLEM — R45.4 IRRITABILITY AND ANGER: Status: ACTIVE | Noted: 2023-08-10

## 2023-08-10 PROBLEM — G43.911 INTRACTABLE MIGRAINE WITH STATUS MIGRAINOSUS: Status: ACTIVE | Noted: 2023-08-10

## 2023-08-10 PROBLEM — E56.9 VITAMIN DEFICIENCY: Status: ACTIVE | Noted: 2023-08-10

## 2023-08-10 PROBLEM — M79.7 FIBROMYALGIA: Status: ACTIVE | Noted: 2023-08-10

## 2023-08-10 PROBLEM — M19.011 PRIMARY OSTEOARTHRITIS OF RIGHT SHOULDER: Status: ACTIVE | Noted: 2023-08-10

## 2023-08-10 PROBLEM — M47.26 OSTEOARTHRITIS OF SPINE WITH RADICULOPATHY, LUMBAR REGION: Status: ACTIVE | Noted: 2023-08-10

## 2023-08-10 PROCEDURE — G0439 PPPS, SUBSEQ VISIT: HCPCS | Performed by: FAMILY MEDICINE

## 2023-08-10 PROCEDURE — 99212 OFFICE O/P EST SF 10 MIN: CPT | Performed by: FAMILY MEDICINE

## 2023-08-10 RX ORDER — SUMATRIPTAN SUCCINATE 100 MG/1
100 TABLET ORAL ONCE AS NEEDED
Qty: 9 TABLET | Refills: 1 | Status: SHIPPED | OUTPATIENT
Start: 2023-08-10

## 2023-08-10 RX ORDER — IPRATROPIUM BROMIDE 21 UG/1
2 SPRAY, METERED NASAL EVERY 12 HOURS
Qty: 30 ML | Refills: 1 | Status: SHIPPED | OUTPATIENT
Start: 2023-08-10

## 2023-08-10 RX ORDER — MULTIVIT-MINS/IRON/FOLIC/LYCOP 8-200-600
1 TABLET ORAL DAILY
COMMUNITY

## 2023-08-10 RX ORDER — SUMATRIPTAN SUCCINATE 100 MG/1
100 TABLET ORAL ONCE AS NEEDED
COMMUNITY
Start: 2022-11-10 | End: 2023-08-10 | Stop reason: SDUPTHER

## 2023-08-10 RX ORDER — DEXTROMETHORPHAN HYDROBROMIDE, GUAIFENESIN 5; 100 MG/5ML; MG/5ML
650 LIQUID ORAL EVERY 8 HOURS PRN
COMMUNITY

## 2023-08-10 RX ORDER — SULFAMETHOXAZOLE AND TRIMETHOPRIM 800; 160 MG/1; MG/1
1 TABLET ORAL EVERY OTHER DAY
COMMUNITY
Start: 2023-03-06

## 2023-08-10 RX ORDER — PREGABALIN 100 MG/1
100 CAPSULE ORAL 3 TIMES DAILY
COMMUNITY
Start: 2020-09-23

## 2023-08-10 RX ORDER — AMOXICILLIN 500 MG/1
500 CAPSULE ORAL EVERY 8 HOURS SCHEDULED
Qty: 30 CAPSULE | Refills: 0 | Status: SHIPPED | OUTPATIENT
Start: 2023-08-10 | End: 2023-08-20

## 2023-08-10 ASSESSMENT — PATIENT HEALTH QUESTIONNAIRE - PHQ9
7. TROUBLE CONCENTRATING ON THINGS, SUCH AS READING THE NEWSPAPER OR WATCHING TELEVISION: NEARLY EVERY DAY
4. FEELING TIRED OR HAVING LITTLE ENERGY: NEARLY EVERY DAY
5. POOR APPETITE OR OVEREATING: NEARLY EVERY DAY
3. TROUBLE FALLING OR STAYING ASLEEP OR SLEEPING TOO MUCH: NEARLY EVERY DAY
1. LITTLE INTEREST OR PLEASURE IN DOING THINGS: NEARLY EVERY DAY
10. IF YOU CHECKED OFF ANY PROBLEMS, HOW DIFFICULT HAVE THESE PROBLEMS MADE IT FOR YOU TO DO YOUR WORK, TAKE CARE OF THINGS AT HOME, OR GET ALONG WITH OTHER PEOPLE: VERY DIFFICULT
6. FEELING BAD ABOUT YOURSELF - OR THAT YOU ARE A FAILURE OR HAVE LET YOURSELF OR YOUR FAMILY DOWN: NEARLY EVERY DAY
SUM OF ALL RESPONSES TO PHQ9 QUESTIONS 1 AND 2: 6
9. THOUGHTS THAT YOU WOULD BE BETTER OFF DEAD, OR OF HURTING YOURSELF: SEVERAL DAYS
8. MOVING OR SPEAKING SO SLOWLY THAT OTHER PEOPLE COULD HAVE NOTICED. OR THE OPPOSITE, BEING SO FIGETY OR RESTLESS THAT YOU HAVE BEEN MOVING AROUND A LOT MORE THAN USUAL: NEARLY EVERY DAY
2. FEELING DOWN, DEPRESSED OR HOPELESS: NEARLY EVERY DAY
SUM OF ALL RESPONSES TO PHQ QUESTIONS 1-9: 25

## 2023-08-10 ASSESSMENT — ENCOUNTER SYMPTOMS
FEVER: 0
BLOOD IN STOOL: 0
RECTAL PAIN: 0
DIZZINESS: 0
HEADACHES: 0
SLEEP DISTURBANCE: 0
CHEST TIGHTNESS: 0
COLOR CHANGE: 0
POLYDIPSIA: 0
DEPRESSION: 0
SORE THROAT: 0
ADENOPATHY: 0
MYALGIAS: 0
DIARRHEA: 0
DYSURIA: 0
CONFUSION: 0
SEIZURES: 0
CONSTIPATION: 0
DECREASED CONCENTRATION: 0
SHORTNESS OF BREATH: 0
RHINORRHEA: 0
COUGH: 1
SINUS PRESSURE: 0
FATIGUE: 1
TROUBLE SWALLOWING: 0
SPEECH DIFFICULTY: 0
PALPITATIONS: 0
EYE PAIN: 0
ARTHRALGIAS: 0
LOSS OF SENSATION IN FEET: 0
NERVOUS/ANXIOUS: 0
POLYPHAGIA: 0
DYSPHORIC MOOD: 0
AGITATION: 0
FLANK PAIN: 0
ABDOMINAL DISTENTION: 0
HEMATURIA: 0
ABDOMINAL PAIN: 0
ACTIVITY CHANGE: 0
SINUS PAIN: 0
OCCASIONAL FEELINGS OF UNSTEADINESS: 0
NECK STIFFNESS: 0
APPETITE CHANGE: 0
STRIDOR: 0
PHOTOPHOBIA: 0

## 2023-08-10 ASSESSMENT — ACTIVITIES OF DAILY LIVING (ADL)
GROCERY_SHOPPING: INDEPENDENT
DRESSING: INDEPENDENT
DOING_HOUSEWORK: INDEPENDENT
MANAGING_FINANCES: INDEPENDENT
BATHING: INDEPENDENT
TAKING_MEDICATION: INDEPENDENT

## 2023-08-10 NOTE — PROGRESS NOTES
Subjective   Reason for Visit: Christofer Walter is an 58 y.o. male here for a Medicare Wellness visit.     Past Medical, Surgical, and Family History reviewed and updated in chart.    Reviewed all medications by prescribing practitioner or clinical pharmacist (such as prescriptions, OTCs, herbal therapies and supplements) and documented in the medical record.    Patient is having slight cough, nausea, nasal drainage, fatigue, and ear fullness x 1 month.  He is on Bactrim currently, has taken Nyquil. He is sleeping more than normal.   He is eating and drinking normally. He has had 2 episodes of being dizzy but denies any syncope.    He needs a referral to Dr. Raya to get follow up on thyroid post partial surgery.       Cough  Pertinent negatives include no chest pain, ear pain, fever, headaches, myalgias, rash, rhinorrhea, sore throat or shortness of breath. There is no history of environmental allergies.   Fatigue  Associated symptoms include coughing and fatigue. Pertinent negatives include no abdominal pain, arthralgias, chest pain, congestion, fever, headaches, myalgias, rash or sore throat.   Ear Fullness   Associated symptoms include coughing. Pertinent negatives include no abdominal pain, diarrhea, ear discharge, headaches, rash, rhinorrhea or sore throat.       Patient Care Team:  Anil Garcia DO as PCP - General (Family Medicine)  Anil Garcia DO as PCP - [employer] PCP     Review of Systems   Constitutional:  Positive for fatigue. Negative for activity change, appetite change and fever.   HENT:  Negative for congestion, dental problem, ear discharge, ear pain, mouth sores, rhinorrhea, sinus pressure, sinus pain, sore throat, tinnitus and trouble swallowing.    Eyes:  Negative for photophobia, pain and visual disturbance.   Respiratory:  Positive for cough. Negative for chest tightness, shortness of breath and stridor.    Cardiovascular:  Negative for chest pain and palpitations.  "  Gastrointestinal:  Negative for abdominal distention, abdominal pain, blood in stool, constipation, diarrhea and rectal pain.   Endocrine: Negative for cold intolerance, heat intolerance, polydipsia, polyphagia and polyuria.   Genitourinary:  Negative for dysuria, flank pain, hematuria and urgency.   Musculoskeletal:  Negative for arthralgias, gait problem, myalgias and neck stiffness.   Skin:  Negative for color change and rash.   Allergic/Immunologic: Negative for environmental allergies and food allergies.   Neurological:  Negative for dizziness, seizures, syncope, speech difficulty and headaches.   Hematological:  Negative for adenopathy.   Psychiatric/Behavioral:  Negative for agitation, confusion, decreased concentration, dysphoric mood and sleep disturbance. The patient is not nervous/anxious.        Objective   Vitals:  /64 (BP Location: Right arm, Patient Position: Sitting, BP Cuff Size: Adult)   Pulse 66   Resp 16   Ht 1.753 m (5' 9\")   Wt 91.2 kg (201 lb)   SpO2 98%   BMI 29.68 kg/m²       Physical Exam  Vitals reviewed.   Constitutional:       General: He is not in acute distress.     Appearance: Normal appearance. He is normal weight. He is not ill-appearing or diaphoretic.   HENT:      Head: Normocephalic.      Right Ear: Tympanic membrane and external ear normal.      Left Ear: Tympanic membrane and external ear normal.      Nose: Nose normal. No congestion.      Mouth/Throat:      Pharynx: No posterior oropharyngeal erythema.   Eyes:      General:         Right eye: No discharge.         Left eye: No discharge.      Extraocular Movements: Extraocular movements intact.      Conjunctiva/sclera: Conjunctivae normal.      Pupils: Pupils are equal, round, and reactive to light.   Cardiovascular:      Rate and Rhythm: Normal rate and regular rhythm.      Pulses: Normal pulses.      Heart sounds: Normal heart sounds. No murmur heard.  Pulmonary:      Effort: Pulmonary effort is normal. No " respiratory distress.      Breath sounds: Normal breath sounds. No wheezing or rales.   Chest:      Chest wall: No tenderness.   Abdominal:      General: Abdomen is flat. Bowel sounds are normal. There is no distension.      Palpations: There is no mass.      Tenderness: There is no abdominal tenderness. There is no guarding.   Musculoskeletal:         General: No tenderness. Normal range of motion.      Cervical back: Normal range of motion and neck supple. No tenderness.      Right lower leg: No edema.      Left lower leg: No edema.   Skin:     General: Skin is warm and dry.      Coloration: Skin is not jaundiced.      Findings: No bruising or erythema.   Neurological:      General: No focal deficit present.      Mental Status: He is alert and oriented to person, place, and time. Mental status is at baseline.      Cranial Nerves: No cranial nerve deficit.      Sensory: No sensory deficit.      Coordination: Coordination normal.      Gait: Gait normal.   Psychiatric:         Mood and Affect: Mood normal.         Thought Content: Thought content normal.         Judgment: Judgment normal.         Assessment/Plan   Problem List Items Addressed This Visit       Anxiety disorder    COPD (chronic obstructive pulmonary disease) (CMS/[facility])    Dyslipidemia    Relevant Orders    Comprehensive Metabolic Panel    Lipid Panel    CBC and Auto Differential    Polycythemia vera (CMS/[facility])    Pulmonary nodule    PVD (peripheral vascular disease) (CMS/[facility])    Current Assessment & Plan     Stable/monitored         Thyroid nodule    Relevant Orders    Referral to ENT    Depression, recurrent (CMS/[facility])    Current Assessment & Plan     Stable/monitored          Other Visit Diagnoses       Encounter for Medicare annual wellness exam    -  Primary    Chronic nonintractable headache, unspecified headache type        Relevant Medications    SUMAtriptan (Imitrex) 100 mg tablet    Acute maxillary sinusitis, recurrence not specified         Relevant Medications    ipratropium (Atrovent) 21 mcg (0.03 %) nasal spray    amoxicillin (Amoxil) 500 mg capsule    ACP (advance care planning)                  Scribe Attestation  By signing my name below, I, ANA Rea , Joselyn   attest that this documentation has been prepared under the direction and in the presence of Anil Garcia DO.   Provider Attestation - Scribe documentation    All medical record entries made by the Scribe were at my direction and personally dictated by me. I have reviewed the chart and agree that the record accurately reflects my personal performance of the history, physical exam, discussion and plan.

## 2023-08-10 NOTE — PATIENT INSTRUCTIONS
Follow up in 3 months    Continue current medications and therapy for chronic medical conditions.    Patient was advised importance of proper diet/nutrition in addition adequate hydration. Patient was encouraged moderate exercise program to include 30 minutes daily for 5 days of the week or 150 minutes weekly. Patient will follow-up with us as scheduled.

## 2023-08-18 DIAGNOSIS — B20 CURRENTLY ASYMPTOMATIC HIV INFECTION, WITH HISTORY OF HIV-RELATED ILLNESS (HCC): ICD-10-CM

## 2023-08-18 LAB
ALBUMIN SERPL-MCNC: 4.3 G/DL (ref 3.5–4.6)
ALP SERPL-CCNC: 105 U/L (ref 35–104)
ALT SERPL-CCNC: 20 U/L (ref 0–41)
ANION GAP SERPL CALCULATED.3IONS-SCNC: 10 MEQ/L (ref 9–15)
AST SERPL-CCNC: 17 U/L (ref 0–40)
BILIRUB SERPL-MCNC: 0.4 MG/DL (ref 0.2–0.7)
BUN SERPL-MCNC: 15 MG/DL (ref 6–20)
CALCIUM SERPL-MCNC: 9.3 MG/DL (ref 8.5–9.9)
CHLORIDE SERPL-SCNC: 102 MEQ/L (ref 95–107)
CO2 SERPL-SCNC: 30 MEQ/L (ref 20–31)
CREAT SERPL-MCNC: 1.02 MG/DL (ref 0.7–1.2)
ERYTHROCYTE [DISTWIDTH] IN BLOOD BY AUTOMATED COUNT: 15.2 % (ref 11.5–14.5)
GLOBULIN SER CALC-MCNC: 3.1 G/DL (ref 2.3–3.5)
GLUCOSE SERPL-MCNC: 84 MG/DL (ref 70–99)
HCT VFR BLD AUTO: 47.3 % (ref 42–52)
HGB BLD-MCNC: 16 G/DL (ref 14–18)
MCH RBC QN AUTO: 31.8 PG (ref 27–31.3)
MCHC RBC AUTO-ENTMCNC: 33.8 % (ref 33–37)
MCV RBC AUTO: 94.2 FL (ref 79–92.2)
PLATELET # BLD AUTO: 167 K/UL (ref 130–400)
POTASSIUM SERPL-SCNC: 4.1 MEQ/L (ref 3.4–4.9)
PROT SERPL-MCNC: 7.4 G/DL (ref 6.3–8)
RBC # BLD AUTO: 5.03 M/UL (ref 4.7–6.1)
SODIUM SERPL-SCNC: 142 MEQ/L (ref 135–144)
WBC # BLD AUTO: 5.9 K/UL (ref 4.8–10.8)

## 2023-08-19 LAB
CD3+CD4+ CELLS # BLD: 360 CELLS/UL (ref 430–1800)
CD4 % HELPER T CELL: 18 % (ref 32–64)
IMMUNODEFICIENCY MARKERS SPEC-IMP: ABNORMAL
RPR SER QL: NORMAL

## 2023-08-21 LAB
HIV QUANT LOG: 1.55 LOG CPY/ML
HIV-1 RNA BY PCR, QN: 35.8 CPY/ML
HIV-1 RNA BY PCR, QN: DETECTED
SOURCE: ABNORMAL

## 2023-09-12 ENCOUNTER — TELEPHONE (OUTPATIENT)
Dept: INFECTIOUS DISEASES | Age: 58
End: 2023-09-12

## 2023-09-12 NOTE — TELEPHONE ENCOUNTER
Pt called into office in need of help with transport for up coming apt. .     Rn and pt reviewed additional transport options and assistance. It is determined that pt will need transport assistance as last resort. Aware of policy and  time. [facility] notified. All appts are related to medical treatment and are necessary for compliance    This transportation assistance will help patient remain in medical care by enabling them to access medical and support services. Appt:   9.13.23  at [phone]- [phone] [address]  9.13.23 AFTER prior apt- SS office (income)     Denies any needs at this time. Denies any issues that need Doctor attention.  Will call with concerns

## 2023-09-13 ENCOUNTER — TELEPHONE (OUTPATIENT)
Dept: INFECTIOUS DISEASES | Age: 58
End: 2023-09-13

## 2023-09-13 NOTE — TELEPHONE ENCOUNTER
Pt called. Acknowledged missed transport he requested yesterday for today. Pt stated he was at the [facility]. Pt did not want to elaboration the subject. Pt stated he is \"not good\", requested to meet with CM. CM scheduled an appointment on 9/18/23 @ 10AM.  Per request, CM scheduled transport for appointment. CM assessed need, arrangements as last resort. Pt is aware of Transportation Policy.

## 2023-09-15 ENCOUNTER — TELEPHONE (OUTPATIENT)
Dept: INFECTIOUS DISEASES | Age: 58
End: 2023-09-15

## 2023-09-15 ENCOUNTER — HOSPITAL ENCOUNTER (EMERGENCY)
Age: 58
Discharge: HOME | End: 2023-09-15
Payer: MEDICARE

## 2023-09-15 VITALS
HEART RATE: 53 BPM | DIASTOLIC BLOOD PRESSURE: 63 MMHG | WEIGHT: 195 LBS | RESPIRATION RATE: 16 BRPM | OXYGEN SATURATION: 95 % | TEMPERATURE: 98.6 F | SYSTOLIC BLOOD PRESSURE: 127 MMHG | HEIGHT: 69 IN | BODY MASS INDEX: 28.88 KG/M2

## 2023-09-15 DIAGNOSIS — Z51.89 ENCOUNTER FOR WOUND RE-CHECK: Primary | ICD-10-CM

## 2023-09-15 PROCEDURE — 99283 EMERGENCY DEPT VISIT LOW MDM: CPT

## 2023-09-15 RX ORDER — BACITRACIN ZINC 500 [USP'U]/G
OINTMENT TOPICAL ONCE
Status: DISCONTINUED | OUTPATIENT
Start: 2023-09-15 | End: 2023-09-15

## 2023-09-15 ASSESSMENT — PAIN - FUNCTIONAL ASSESSMENT: PAIN_FUNCTIONAL_ASSESSMENT: NONE - DENIES PAIN

## 2023-09-15 ASSESSMENT — LIFESTYLE VARIABLES
HOW OFTEN DO YOU HAVE A DRINK CONTAINING ALCOHOL: NEVER
HOW MANY STANDARD DRINKS CONTAINING ALCOHOL DO YOU HAVE ON A TYPICAL DAY: PATIENT DOES NOT DRINK

## 2023-09-15 NOTE — ED TRIAGE NOTES
Pt arrived via EMS from [facility] because he pulled the stiches out of his left wrist that were placed yesterday d/t self infliction. Pt advised he pulled the stitches out because he wants to go to a different facility. The wound is wrapped and the bleeding is controlled.

## 2023-09-15 NOTE — ED NOTES
[facility] arrived to transport pt back to [facility] and he initially refused to get out of the bed, I instructed the pt to get out of the bed and onto the cot and he did but stated, \"I'll be back. \"  When the paramedics were taking the pt to the squad the pt tore the steri strips off, the medics notified us and they were advised the pt is pinkslipped by [facility] and he has to go back, there is no reason to keep him here.      Irene Lester, MARCO ANTONIO  09/15/23 9348 Colchicine Counseling:  Patient counseled regarding adverse effects including but not limited to stomach upset (nausea, vomiting, stomach pain, or diarrhea).  Patient instructed to limit alcohol consumption while taking this medication.  Colchicine may reduce blood counts especially with prolonged use.  The patient understands that monitoring of kidney function and blood counts may be required, especially at baseline. The patient verbalized understanding of the proper use and possible adverse effects of colchicine.  All of the patient's questions and concerns were addressed.

## 2023-09-15 NOTE — ED NOTES
Pt removed is dressing states he refusing to wear dressing. Pt states he is not going back to [facility], pt states \"OVER MY DEAD BODY AM I GOING BACK TO [facility]\".        Fay Stokes RN  09/15/23 1192

## 2023-09-15 NOTE — TELEPHONE ENCOUNTER
Pt called in to talk. [facility]   Pt tried to commit suicide. Cut himself. States he did call 911 himself. Tearful on phone  Canceled all upcoming sept apts and transport. Unknown discharge date. Provided active listening and support.     Pt will call next week with updates or if needed call for 1x1

## 2023-09-15 NOTE — ED NOTES
Pt's wound was cleaned, steri strips applied, and wrapped with guaze and non-adherent pad. Pt stated. \"Good, something easy to take off. \"  I told the pt he can come back several times it won't help him because we cannot send him to a different facility.      James Rose RN  09/15/23 6241

## 2023-09-15 NOTE — ED PROVIDER NOTES
[facility] ED  EMERGENCY DEPARTMENT ENCOUNTER      Pt Name: Linda Wilks  MRN: [id number]  [address] [facility] 1965  Date of evaluation: 9/15/2023  Provider: Eli Hughes PA-C      HISTORY OF PRESENT ILLNESS    Linda Wilks is a 66 y.o. male who presents to the Emergency Department with wound check. Pt is residing at [facility] psychiatric facility due to suicide attepmt. He cut his left forearm 2 days ago and had it repaired at OSH. At [facility] he ripped out 2 stiches and they sent him to er to be evaulated. REVIEW OF SYSTEMS       Review of Systems   Skin:  Positive for wound. Psychiatric/Behavioral:  Positive for agitation. All other systems reviewed and are negative.         PAST MEDICAL HISTORY     Past Medical History:   Diagnosis Date    Anxiety     Cancer ([address])     skin  L arm    Cellulitis of heel, right 2016    after surgery x 3 on tendion     CHF (congestive heart failure) (HCC)     COPD (chronic obstructive pulmonary disease) (HCC)     Depression     Erectile dysfunction     Headache(784.0)     HIV (human immunodeficiency virus infection) ([address])     Hyperlipidemia     Liver disease     Osteoarthritis          SURGICAL HISTORY       Past Surgical History:   Procedure Laterality Date    CHOLECYSTECTOMY      CYST REMOVAL      R wrist x2    FOOT SURGERY      bone extraction    FRACTURE SURGERY      UPPER GASTROINTESTINAL ENDOSCOPY N/A 1/16/2019    EGD ESOPHAGOGASTRODUODENOSCOPY performed by Rebecca Patterson MD at [address]       Previous Medications    ALBUTEROL SULFATE HFA (PROVENTIL;VENTOLIN;PROAIR) 108 (90 BASE) MCG/ACT INHALER    INHALE 2 PUFFS BY MOUTH TWICE DAILY AS NEEDED FOR SHORTNESS OF BREATH    ERGOCALCIFEROL (ERGOCALCIFEROL) 10956 UNITS CAPSULE    Take 1 capsule by mouth once a week for 8 days    HYDROXYZINE (VISTARIL) 25 MG CAPSULE    Take by mouth    MUPIROCIN (BACTROBAN) 2 % OINTMENT    apply to affected area three times a out today as he did not want to be a [facility] any longer. The wound is still approximated with 2 stitches and there is a slight opening at the medial aspect that is not gaping or bleeding but will apply Steri-Strips to this area. Wound was thoroughly cleaned. Patient is medically cleared to return back to [facility] where he is pink slipped 2. PROCEDURES:  Unless otherwise noted below, none     Procedures    My attending is: chucho      FINAL IMPRESSION      1.  Encounter for wound re-check          DISPOSITION/PLAN   DISPOSITION Discharge - Pending Orders Complete 09/15/2023 03:42:03 PM          Ruthann Bean (electronically signed)  Attending Emergency Physician       Sage Torres PA-C  09/15/23 1056

## 2023-09-19 DIAGNOSIS — F19.10 SUBSTANCE ABUSE (MULTI): ICD-10-CM

## 2023-09-19 DIAGNOSIS — F14.10 COCAINE ABUSE (MULTI): ICD-10-CM

## 2023-09-20 ENCOUNTER — TELEPHONE (OUTPATIENT)
Dept: INFECTIOUS DISEASES | Age: 58
End: 2023-09-20

## 2023-09-20 NOTE — TELEPHONE ENCOUNTER
Pt called in with update  No drugs since -- last week- crack ( + drug screen)   Home now. Feeling somewhat better  Refuses any MH meds  On and off with ART  Last VL Next appt: 10/03/2023 at 09:30 AM in Infectious Diseases (Gwen Benavides, DO)     0 Result Notes       Component Ref Range & Units    HIV 1 RNA COPIES/mL NOT DETECTED    HIV 1 RNA, log number PCR log10 cpy/mL NOT CALCULATED           Follow up with Dr Stephania Tan 10. 3. 23- discussed importance. Also requesting 1x1 with CM (scheduled for /830)     Provided active listening and support as pt discussed recent health and depression   [employer] consoling with [facility]  [facility] at [facility] and pink [facility]. Rn and pt reviewed additional transport options and assistance. It is determined that pt will need transport assistance as last resort. Aware of policy and  time. [facility] notified. All appts are related to medical treatment and are necessary for compliance    This transportation assistance will help patient remain in medical care by enabling them to access medical and support services. Appt: 10.3.23 at 830  CM then Dr Stephania Tan    Denies any needs at this time. Denies any issues that need Doctor attention. Will call with concerns      [employer] -- to sustain medical benefits with JFS    Nutrition health and well being.

## 2023-09-21 ENCOUNTER — TELEPHONE (OUTPATIENT)
Dept: INFECTIOUS DISEASES | Age: 58
End: 2023-09-21

## 2023-09-21 NOTE — TELEPHONE ENCOUNTER
Pt called into office in need of help with transport for up coming apt. .     Rn and pt reviewed additional transport options and assistance. It is determined that pt will need transport assistance as last resort. Aware of policy and  time. Pegasus notified. All appts are related to medical treatment and are necessary for compliance    This transportation assistance will help patient remain in medical care by enabling them to access medical and support services. Appt: 9.22.23  at 11-- SS office    Denies any needs at this time. Denies any issues that need Doctor attention.  Will call with concerns

## 2023-09-27 ENCOUNTER — TELEPHONE (OUTPATIENT)
Dept: INFECTIOUS DISEASES | Age: 58
End: 2023-09-27

## 2023-09-27 NOTE — TELEPHONE ENCOUNTER
Pt called, [facility] CM was not familiar with facility. CM looked up facility, which is a  [facility].  Pt stated he has been at facility since 09/25/23. Per pt, he was taken to facility as a result of him \"hearing voices\" and presented at the [facility] in [place]. Pt was initially taken to [facility]. Pt stated he will be there until Thursday. (9/28/23) This CM could not understand pt well due to slurred speech. Per request, CM called his  at [employer] [phone]. CM left message requesting return call. CM will follow up with pt as needed.     [facility]: [phone]    Code for Pt: [phone]

## 2023-10-01 ENCOUNTER — TELEPHONE (OUTPATIENT)
Dept: INFECTIOUS DISEASES | Age: 58
End: 2023-10-01

## 2023-10-02 ENCOUNTER — TELEPHONE (OUTPATIENT)
Dept: INFECTIOUS DISEASES | Age: 58
End: 2023-10-02

## 2023-10-02 NOTE — TELEPHONE ENCOUNTER
Pt called in with updates. Anibal from [facility] 9.29.23  [id number] [facility] concerns. Pt now feels much better. Denies S/H    Rn and pt reviewed additional transport options and assistance. It is determined that pt will need transport assistance as last resort. Aware of policy and  time. [facility] notified. All appts are related to medical treatment and are necessary for compliance    This transportation assistance will help patient remain in medical care by enabling them to access medical and support services. Appt: 10.11.23 at 9 am CM     Denies any needs at this time. Denies any issues that need Doctor attention.  Will call with concerns

## 2023-10-10 ENCOUNTER — TELEPHONE (OUTPATIENT)
Dept: INFECTIOUS DISEASES | Age: 58
End: 2023-10-10

## 2023-10-10 DIAGNOSIS — B20 CURRENTLY ASYMPTOMATIC HIV INFECTION, WITH HISTORY OF HIV-RELATED ILLNESS (HCC): Primary | ICD-10-CM

## 2023-10-10 NOTE — TELEPHONE ENCOUNTER
CM called pt to remind him of transportation scheduled for 10/11/23 @ 8:30Am. Pt has an appointment with this CM for 1x1, OHDAP Renewal and RW Updates.

## 2023-10-11 ENCOUNTER — NURSE ONLY (OUTPATIENT)
Dept: INFECTIOUS DISEASES | Age: 58
End: 2023-10-11

## 2023-10-11 NOTE — PROGRESS NOTES
Pt presented for scheduled appointment with CM. CM assisted with [facility] for medication co-pay assistance. CM completed Semi-Annual Review, no changes noted to RW Part A Eligibility for services. CM updated ISP, pt agreed with POC. Plan of Care includes Medical, Dental, Mental Health, Substance Abuse, Health Insurance Transportation and Social Support. CM provided 1x1, pt discussed recent admission to [address] [place] [address] Street hears voices, was \"pinked slipped\" due to suicide attempt. Pt is connected with "[employer] for counseling, refuses psychiatric services. Pt has an appointment with counselor, Saroj Macdonald on 10/12/23. CM provided active listening and support. CM will follow up with pt as needed.

## 2023-10-13 ENCOUNTER — TELEPHONE (OUTPATIENT)
Dept: INFECTIOUS DISEASES | Age: 58
End: 2023-10-13

## 2023-10-13 NOTE — TELEPHONE ENCOUNTER
Per request, CM scheduled transport to [place] on 10/16/23 @ 10:30AM.  CM assessed need, arrangements as last resort. Pt is aware of Transportation Policy.  10/16/23 @ 10AM  Pt's residence:  [address] [place] [address]. [address] [place] [address] [place] [address]    Destination: [place] [address] [facility] [place] [address]    Request is for round trip.

## 2023-10-26 ENCOUNTER — TELEPHONE (OUTPATIENT)
Dept: INFECTIOUS DISEASES | Age: 58
End: 2023-10-26

## 2023-10-26 NOTE — TELEPHONE ENCOUNTER
Pt called into office in need of help with transport for up coming apt. .     Rn and pt reviewed additional transport options and assistance. It is determined that pt will need transport assistance as last resort. He verbalized he is Aware of transport policy and  time. Flora notified. All appts are related to medical treatment and are necessary for compliance    This transportation assistance will help patient remain in medical care by enabling them to access medical and support services    RN scheduled transportation per pt request,     location and  time: 930  Residency:   [address] [place] [address] [phone] (home)       Appt Location, date and apt time:   10.30.23 at 10 am-- [address]  [facility] [address] [facility] assistance-   Round trip    Denies any needs at this time. Denies any issues that need Doctor attention.  Will call with concerns

## 2023-11-01 ENCOUNTER — TELEPHONE (OUTPATIENT)
Dept: INFECTIOUS DISEASES | Age: 58
End: 2023-11-01

## 2023-11-01 NOTE — TELEPHONE ENCOUNTER
CM called pt for follow up. Pt stated he has made decision to be admitted to [facility] for Drug Rehab. CM provided support and encouragement. CM reminded pt of needed current proof of residency. Pt stated he will fax copy to of Photo ID to CM. CM will follow up with pt as needed.

## 2023-11-02 ENCOUNTER — TELEPHONE (OUTPATIENT)
Dept: INFECTIOUS DISEASES | Age: 58
End: 2023-11-02

## 2023-11-02 RX ORDER — SULFAMETHOXAZOLE AND TRIMETHOPRIM 800; 160 MG/1; MG/1
1 TABLET ORAL
Qty: 12 TABLET | Refills: 1 | Status: SHIPPED | OUTPATIENT
Start: 2023-11-03 | End: 2023-11-03 | Stop reason: SDUPTHER

## 2023-11-02 NOTE — TELEPHONE ENCOUNTER
Pt provided current proof of residency via e-mail for RW part A Eligibility for services and [address] with medication co-pay payments. Per request, CM scheduled transport to appointment with [facility] on 11/8/23 @ 3PM.  CM assessed need, arrangements as last resort. Pt is aware of Transportation Policy.  11/8/23 @ @2:30PM  Pt's residence  [address] [place] [id number]    Destination:  [address]    Request is for round trip.

## 2023-11-03 RX ORDER — SULFAMETHOXAZOLE AND TRIMETHOPRIM 800; 160 MG/1; MG/1
1 TABLET ORAL
Qty: 12 TABLET | Refills: 1 | Status: SHIPPED | OUTPATIENT
Start: 2023-11-03 | End: 2023-12-29

## 2023-11-07 ENCOUNTER — TELEPHONE (OUTPATIENT)
Dept: INFECTIOUS DISEASES | Age: 58
End: 2023-11-07

## 2023-11-07 PROBLEM — S61.519A LACERATION OF WRIST: Status: ACTIVE | Noted: 2023-11-07

## 2023-11-07 PROBLEM — G89.29 CHRONIC PAIN OF RIGHT KNEE: Status: ACTIVE | Noted: 2023-11-07

## 2023-11-07 PROBLEM — M25.511 RIGHT SHOULDER PAIN: Status: ACTIVE | Noted: 2023-11-07

## 2023-11-07 PROBLEM — R20.0 NUMBNESS AND TINGLING OF RIGHT LOWER EXTREMITY: Status: ACTIVE | Noted: 2023-11-07

## 2023-11-07 PROBLEM — M25.561 CHRONIC PAIN OF RIGHT KNEE: Status: ACTIVE | Noted: 2023-11-07

## 2023-11-07 PROBLEM — R45.89 SUICIDAL BEHAVIOR: Status: ACTIVE | Noted: 2023-11-07

## 2023-11-07 PROBLEM — R51.9 HEADACHE: Status: ACTIVE | Noted: 2023-11-07

## 2023-11-07 PROBLEM — F19.951: Status: ACTIVE | Noted: 2023-11-07

## 2023-11-07 PROBLEM — M79.672 INTRACTABLE LEFT HEEL PAIN: Status: ACTIVE | Noted: 2023-11-07

## 2023-11-07 PROBLEM — R45.850 HOMICIDAL IDEATION: Status: ACTIVE | Noted: 2023-11-07

## 2023-11-07 PROBLEM — R20.2 NUMBNESS AND TINGLING OF RIGHT LOWER EXTREMITY: Status: ACTIVE | Noted: 2023-11-07

## 2023-11-07 RX ORDER — SULFAMETHOXAZOLE AND TRIMETHOPRIM 800; 160 MG/1; MG/1
1 TABLET ORAL DAILY
COMMUNITY

## 2023-11-07 RX ORDER — ABACAVIR SULFATE, DOLUTEGRAVIR SODIUM, LAMIVUDINE 600; 50; 300 MG/1; MG/1; MG/1
1 TABLET, FILM COATED ORAL DAILY
COMMUNITY

## 2023-11-07 RX ORDER — TRAMADOL HYDROCHLORIDE 50 MG/1
50 TABLET ORAL EVERY 6 HOURS PRN
COMMUNITY

## 2023-11-07 RX ORDER — ZIPRASIDONE HYDROCHLORIDE 20 MG/1
CAPSULE ORAL
COMMUNITY
Start: 2023-09-19

## 2023-11-07 RX ORDER — OXCARBAZEPINE 150 MG/1
TABLET, FILM COATED ORAL
COMMUNITY
Start: 2023-09-19

## 2023-11-07 RX ORDER — DICLOFENAC SODIUM 10 MG/G
2 GEL TOPICAL 2 TIMES DAILY
COMMUNITY

## 2023-11-07 RX ORDER — PANTOPRAZOLE SODIUM 40 MG/1
40 TABLET, DELAYED RELEASE ORAL DAILY
COMMUNITY

## 2023-11-07 RX ORDER — ASPIRIN 325 MG
50000 TABLET, DELAYED RELEASE (ENTERIC COATED) ORAL
COMMUNITY

## 2023-11-07 RX ORDER — TOPIRAMATE 25 MG/1
50 TABLET ORAL NIGHTLY
COMMUNITY

## 2023-11-07 RX ORDER — ALBUTEROL SULFATE 90 UG/1
AEROSOL, METERED RESPIRATORY (INHALATION)
COMMUNITY

## 2023-11-07 RX ORDER — FLUTICASONE PROPIONATE 110 UG/1
2 AEROSOL, METERED RESPIRATORY (INHALATION) 2 TIMES DAILY PRN
COMMUNITY

## 2023-11-07 NOTE — TELEPHONE ENCOUNTER
CM called pt to remind him of scheduled transport on 11/8/23 @ 2:30PM for appointment with [facility].

## 2023-11-09 ENCOUNTER — TELEPHONE (OUTPATIENT)
Dept: INFECTIOUS DISEASES | Age: 58
End: 2023-11-09

## 2023-11-09 NOTE — TELEPHONE ENCOUNTER
CM received e-mail from Caryl Estrada at [address] [facility], pt eligible for [facility] assistance with medication co-pay payments. Pt informed of same.

## 2023-11-10 ENCOUNTER — TELEPHONE (OUTPATIENT)
Dept: INFECTIOUS DISEASES | Age: 58
End: 2023-11-10

## 2023-11-10 NOTE — TELEPHONE ENCOUNTER
Pt called, informed CM he was able to contact with staff member at [address] [place] [address] will be receiving Drug Counseling at Agency. Pt will consider receiving MH counseling at [facility]. CM provided support and encouragement. CM will follow up with pt as needed.

## 2023-11-10 NOTE — TELEPHONE ENCOUNTER
Pt called, informed CM he is ready to engage in Drug Rehab/Counseling. CM discussed resources available for assistance. CM referred pt to [facility].  CM will follow up with pt as needed.

## 2023-11-16 RX ORDER — ABACAVIR SULFATE, DOLUTEGRAVIR SODIUM, LAMIVUDINE 600; 50; 300 MG/1; MG/1; MG/1
TABLET, FILM COATED ORAL
Qty: 30 TABLET | Refills: 5 | Status: SHIPPED | OUTPATIENT
Start: 2023-11-16 | End: 2023-11-17 | Stop reason: SDUPTHER

## 2023-11-16 RX ORDER — SULFAMETHOXAZOLE AND TRIMETHOPRIM 800; 160 MG/1; MG/1
1 TABLET ORAL
Qty: 12 TABLET | Refills: 4 | Status: SHIPPED | OUTPATIENT
Start: 2023-11-17 | End: 2024-04-05

## 2023-11-17 ENCOUNTER — TELEPHONE (OUTPATIENT)
Dept: INFECTIOUS DISEASES | Age: 58
End: 2023-11-17

## 2023-11-17 RX ORDER — ABACAVIR SULFATE, DOLUTEGRAVIR SODIUM, LAMIVUDINE 600; 50; 300 MG/1; MG/1; MG/1
TABLET, FILM COATED ORAL
Qty: 30 TABLET | Refills: 5 | Status: SHIPPED | OUTPATIENT
Start: 2023-11-17

## 2023-11-17 NOTE — TELEPHONE ENCOUNTER
Pt called in stating he was having issues filling [employer].   ODAP approved.   Pt is to call insurance.   Script sent to [facility].   Will call with other issues and concerns.

## 2023-11-21 ENCOUNTER — TELEPHONE (OUTPATIENT)
Dept: INFECTIOUS DISEASES | Age: 58
End: 2023-11-21

## 2023-11-21 NOTE — TELEPHONE ENCOUNTER
CM called pt to let  him know he is scheduled for an appointment with Dr. Nicolas Blackburn 11/21/23. Pt has decided not to keep appointment. Pt has not had blood work nor has been taking his medication. CM provided active listening and support. CM encouraged follow up and discussed compliance. CM will inform RN of same. CM inquired about follow up with [facility].  Pt stated Agency does not accept Medicare, was told to participate in Groups. CM will explore other resources for pt.

## 2023-11-29 ENCOUNTER — TELEPHONE (OUTPATIENT)
Dept: INFECTIOUS DISEASES | Age: 58
End: 2023-11-29

## 2023-11-29 NOTE — TELEPHONE ENCOUNTER
Pt called in needing help with transport for upcoming appt. Rn and pt reviewed additional transport options and assistance. It is determined he will use [facility] as last report for help. Discussed transport policy. Discussed need to call 2 hours prior to apt time for cancellation. Pt verbalized all understanding. [facility] notified. Pt aware transport will be there for  about 30 mins prior to apt. All appts are related to medical treatment and are necessary for compliance  This transportation assistance will help patient remain in medical care by enabling them to access medical and support services. Note: pts phone may not be on. States he has it silenced a lot. MH concerns but refuses help. Denies S/I - requested pt keep phone on. Requesting transport for court issues [facility] per pt request,     location and  time: 130  Residency:   [address] [place] [address] [phone] (home)       Appt Location and apt time:   12.1.23 at 2 pm --[address] (Court/MH)   [address] [place] [address], [address] [place] [address] Road      Round trip    Denies any needs at this time. Denies any issues that need Doctor attention.  Will call with concerns

## 2023-12-25 ENCOUNTER — HOSPITAL ENCOUNTER (EMERGENCY)
Facility: HOSPITAL | Age: 58
Discharge: PSYCHIATRIC OR SUBSTANCE USE FACILITY | End: 2023-12-26
Attending: EMERGENCY MEDICINE
Payer: MEDICARE

## 2023-12-25 ENCOUNTER — APPOINTMENT (OUTPATIENT)
Dept: CARDIOLOGY | Facility: HOSPITAL | Age: 58
End: 2023-12-25
Payer: MEDICARE

## 2023-12-25 DIAGNOSIS — R44.0 AUDITORY HALLUCINATIONS: ICD-10-CM

## 2023-12-25 DIAGNOSIS — S61.511A LACERATION OF RIGHT WRIST, INITIAL ENCOUNTER: Primary | ICD-10-CM

## 2023-12-25 LAB
ALBUMIN SERPL BCP-MCNC: 4.1 G/DL (ref 3.4–5)
ALP SERPL-CCNC: 84 U/L (ref 33–120)
ALT SERPL W P-5'-P-CCNC: 16 U/L (ref 10–52)
AMPHETAMINES UR QL SCN: ABNORMAL
ANION GAP SERPL CALC-SCNC: 9 MMOL/L (ref 10–20)
APAP SERPL-MCNC: <10 UG/ML
APPEARANCE UR: CLEAR
AST SERPL W P-5'-P-CCNC: 16 U/L (ref 9–39)
BARBITURATES UR QL SCN: ABNORMAL
BASOPHILS # BLD AUTO: 0.03 X10*3/UL (ref 0–0.1)
BASOPHILS NFR BLD AUTO: 0.5 %
BENZODIAZ UR QL SCN: ABNORMAL
BILIRUB SERPL-MCNC: 0.6 MG/DL (ref 0–1.2)
BILIRUB UR STRIP.AUTO-MCNC: NEGATIVE MG/DL
BUN SERPL-MCNC: 22 MG/DL (ref 6–23)
BZE UR QL SCN: ABNORMAL
CALCIUM SERPL-MCNC: 9.3 MG/DL (ref 8.6–10.3)
CANNABINOIDS UR QL SCN: ABNORMAL
CHLORIDE SERPL-SCNC: 101 MMOL/L (ref 98–107)
CK SERPL-CCNC: 38 U/L (ref 0–325)
CO2 SERPL-SCNC: 29 MMOL/L (ref 21–32)
COLOR UR: YELLOW
CREAT SERPL-MCNC: 1.06 MG/DL (ref 0.5–1.3)
EOSINOPHIL # BLD AUTO: 0.17 X10*3/UL (ref 0–0.7)
EOSINOPHIL NFR BLD AUTO: 2.6 %
ERYTHROCYTE [DISTWIDTH] IN BLOOD BY AUTOMATED COUNT: 13.1 % (ref 11.5–14.5)
ETHANOL SERPL-MCNC: <10 MG/DL
FENTANYL+NORFENTANYL UR QL SCN: ABNORMAL
FLUAV RNA RESP QL NAA+PROBE: NOT DETECTED
FLUBV RNA RESP QL NAA+PROBE: NOT DETECTED
GFR SERPL CREATININE-BSD FRML MDRD: 82 ML/MIN/1.73M*2
GLUCOSE SERPL-MCNC: 81 MG/DL (ref 74–99)
GLUCOSE UR STRIP.AUTO-MCNC: NEGATIVE MG/DL
HCT VFR BLD AUTO: 46.2 % (ref 41–52)
HGB BLD-MCNC: 15.1 G/DL (ref 13.5–17.5)
HYALINE CASTS #/AREA URNS AUTO: ABNORMAL /LPF
IMM GRANULOCYTES # BLD AUTO: 0.02 X10*3/UL (ref 0–0.7)
IMM GRANULOCYTES NFR BLD AUTO: 0.3 % (ref 0–0.9)
KETONES UR STRIP.AUTO-MCNC: ABNORMAL MG/DL
LEUKOCYTE ESTERASE UR QL STRIP.AUTO: NEGATIVE
LYMPHOCYTES # BLD AUTO: 1.65 X10*3/UL (ref 1.2–4.8)
LYMPHOCYTES NFR BLD AUTO: 25 %
MCH RBC QN AUTO: 30.1 PG (ref 26–34)
MCHC RBC AUTO-ENTMCNC: 32.7 G/DL (ref 32–36)
MCV RBC AUTO: 92 FL (ref 80–100)
MONOCYTES # BLD AUTO: 0.54 X10*3/UL (ref 0.1–1)
MONOCYTES NFR BLD AUTO: 8.2 %
MUCOUS THREADS #/AREA URNS AUTO: ABNORMAL /LPF
NEUTROPHILS # BLD AUTO: 4.18 X10*3/UL (ref 1.2–7.7)
NEUTROPHILS NFR BLD AUTO: 63.4 %
NITRITE UR QL STRIP.AUTO: NEGATIVE
NRBC BLD-RTO: 0 /100 WBCS (ref 0–0)
OPIATES UR QL SCN: ABNORMAL
OXYCODONE+OXYMORPHONE UR QL SCN: ABNORMAL
PCP UR QL SCN: ABNORMAL
PH UR STRIP.AUTO: 6 [PH]
PLATELET # BLD AUTO: 187 X10*3/UL (ref 150–450)
POTASSIUM SERPL-SCNC: 3.9 MMOL/L (ref 3.5–5.3)
PROT SERPL-MCNC: 7 G/DL (ref 6.4–8.2)
PROT UR STRIP.AUTO-MCNC: NEGATIVE MG/DL
RBC # BLD AUTO: 5.02 X10*6/UL (ref 4.5–5.9)
RBC # UR STRIP.AUTO: ABNORMAL /UL
RBC #/AREA URNS AUTO: ABNORMAL /HPF
SALICYLATES SERPL-MCNC: <3 MG/DL
SARS-COV-2 RNA RESP QL NAA+PROBE: NOT DETECTED
SODIUM SERPL-SCNC: 135 MMOL/L (ref 136–145)
SP GR UR STRIP.AUTO: 1.02
UROBILINOGEN UR STRIP.AUTO-MCNC: 4 MG/DL
WBC # BLD AUTO: 6.6 X10*3/UL (ref 4.4–11.3)
WBC #/AREA URNS AUTO: ABNORMAL /HPF

## 2023-12-25 PROCEDURE — 85025 COMPLETE CBC W/AUTO DIFF WBC: CPT | Performed by: HOME HEALTH

## 2023-12-25 PROCEDURE — 80053 COMPREHEN METABOLIC PANEL: CPT | Performed by: HOME HEALTH

## 2023-12-25 PROCEDURE — 81001 URINALYSIS AUTO W/SCOPE: CPT | Performed by: HOME HEALTH

## 2023-12-25 PROCEDURE — 82550 ASSAY OF CK (CPK): CPT | Performed by: HOME HEALTH

## 2023-12-25 PROCEDURE — 36415 COLL VENOUS BLD VENIPUNCTURE: CPT | Performed by: HOME HEALTH

## 2023-12-25 PROCEDURE — 87636 SARSCOV2 & INF A&B AMP PRB: CPT | Performed by: HOME HEALTH

## 2023-12-25 PROCEDURE — 80307 DRUG TEST PRSMV CHEM ANLYZR: CPT | Performed by: HOME HEALTH

## 2023-12-25 PROCEDURE — 99285 EMERGENCY DEPT VISIT HI MDM: CPT | Performed by: EMERGENCY MEDICINE

## 2023-12-25 PROCEDURE — 80143 DRUG ASSAY ACETAMINOPHEN: CPT | Performed by: HOME HEALTH

## 2023-12-25 PROCEDURE — 93005 ELECTROCARDIOGRAM TRACING: CPT

## 2023-12-25 RX ORDER — LIDOCAINE HYDROCHLORIDE 10 MG/ML
20 INJECTION INFILTRATION; PERINEURAL ONCE
Status: DISCONTINUED | OUTPATIENT
Start: 2023-12-25 | End: 2023-12-26 | Stop reason: HOSPADM

## 2023-12-25 SDOH — HEALTH STABILITY: MENTAL HEALTH: SLEEP PATTERN: DIFFICULTY FALLING ASLEEP

## 2023-12-25 SDOH — HEALTH STABILITY: MENTAL HEALTH

## 2023-12-25 SDOH — SOCIAL STABILITY: SOCIAL INSECURITY: FAMILY BEHAVIORS: UNABLE TO ASSESS

## 2023-12-25 SDOH — HEALTH STABILITY: MENTAL HEALTH: BEHAVIORS/MOOD: SAD;UNCOOPERATIVE;TEARFUL

## 2023-12-25 SDOH — HEALTH STABILITY: MENTAL HEALTH: HAVE YOU EVER DONE ANYTHING, STARTED TO DO ANYTHING, OR PREPARED TO DO ANYTHING TO END YOUR LIFE?: YES

## 2023-12-25 SDOH — SOCIAL STABILITY: SOCIAL NETWORK: VISITOR BEHAVIORS: UNABLE TO ASSESS

## 2023-12-25 SDOH — HEALTH STABILITY: MENTAL HEALTH: HAVE YOU BEEN THINKING ABOUT HOW YOU MIGHT DO THIS?: YES

## 2023-12-25 SDOH — HEALTH STABILITY: MENTAL HEALTH: HAVE YOU WISHED YOU WERE DEAD OR WISHED YOU COULD GO TO SLEEP AND NOT WAKE UP?: YES

## 2023-12-25 SDOH — HEALTH STABILITY: MENTAL HEALTH: BEHAVIORS/MOOD: PARANOID;IMPULSIVE;AGITATED

## 2023-12-25 SDOH — HEALTH STABILITY: MENTAL HEALTH: IN THE PAST FEW WEEKS, HAVE YOU WISHED YOU WERE DEAD?: YES

## 2023-12-25 SDOH — HEALTH STABILITY: MENTAL HEALTH: DEPRESSION SYMPTOMS: NO PROBLEMS REPORTED OR OBSERVED.

## 2023-12-25 SDOH — HEALTH STABILITY: MENTAL HEALTH: SUICIDE ASSESSMENT: ADULT (C-SSRS)

## 2023-12-25 SDOH — HEALTH STABILITY: MENTAL HEALTH: SUICIDAL BEHAVIOR (3 MONTHS): YES

## 2023-12-25 SDOH — ECONOMIC STABILITY: HOUSING INSECURITY: FEELS SAFE LIVING IN HOME: YES

## 2023-12-25 SDOH — HEALTH STABILITY: MENTAL HEALTH: RISK OF SUICIDE: HIGH RISK

## 2023-12-25 SDOH — HEALTH STABILITY: MENTAL HEALTH: SLEEP PATTERN: UNABLE TO ASSESS

## 2023-12-25 SDOH — HEALTH STABILITY: MENTAL HEALTH
HAVE YOU STARTED TO WORK OUT OR WORKED OUT THE DETAILS OF HOW TO KILL YOURSELF? DO YOU INTENT TO CARRY OUT THIS PLAN?: YES

## 2023-12-25 SDOH — HEALTH STABILITY: MENTAL HEALTH: DESCRIBE YOUR THOUGHTS OF KILLING YOURSELF RIGHT NOW:: CUTTING

## 2023-12-25 SDOH — HEALTH STABILITY: MENTAL HEALTH: HAVE YOU EVER TRIED TO KILL YOURSELF?: YES

## 2023-12-25 SDOH — HEALTH STABILITY: MENTAL HEALTH: CONTENT: OTHER (COMMENT)

## 2023-12-25 SDOH — HEALTH STABILITY: MENTAL HEALTH: HAVE YOU ACTUALLY HAD ANY THOUGHTS OF KILLING YOURSELF?: YES

## 2023-12-25 SDOH — SOCIAL STABILITY: SOCIAL NETWORK: PARENT/GUARDIAN/SIGNIFICANT OTHER INVOLVEMENT: NO INVOLVEMENT

## 2023-12-25 SDOH — HEALTH STABILITY: MENTAL HEALTH: SUICIDAL BEHAVIOR (LIFETIME): YES

## 2023-12-25 SDOH — HEALTH STABILITY: MENTAL HEALTH: NON-SPECIFIC ACTIVE SUICIDAL THOUGHTS (PAST 1 MONTH): YES

## 2023-12-25 SDOH — HEALTH STABILITY: MENTAL HEALTH: WAS THIS WITHIN THE PAST THREE MONTHS?: YES

## 2023-12-25 SDOH — HEALTH STABILITY: MENTAL HEALTH: NEEDS EXPRESSED: DENIES

## 2023-12-25 SDOH — HEALTH STABILITY: MENTAL HEALTH: ARE YOU HAVING THOUGHTS OF KILLING YOURSELF RIGHT NOW?: YES

## 2023-12-25 SDOH — HEALTH STABILITY: MENTAL HEALTH: BEHAVIORS/MOOD: ANXIOUS;PARANOID

## 2023-12-25 SDOH — HEALTH STABILITY: MENTAL HEALTH: CONTENT: BLAMING OTHERS

## 2023-12-25 SDOH — HEALTH STABILITY: MENTAL HEALTH: ANXIETY SYMPTOMS: GENERALIZED

## 2023-12-25 SDOH — HEALTH STABILITY: MENTAL HEALTH: IN THE PAST FEW WEEKS, HAVE YOU FELT THAT YOU OR YOUR FAMILY WOULD BE BETTER OFF IF YOU WERE DEAD?: NO

## 2023-12-25 SDOH — HEALTH STABILITY: MENTAL HEALTH: HAVE YOU HAD THESE THOUGHTS AND HAD SOME INTENTION OF ACTING ON THEM?: YES

## 2023-12-25 SDOH — HEALTH STABILITY: MENTAL HEALTH: HALLUCINATION: AUDITORY;COMMAND

## 2023-12-25 SDOH — HEALTH STABILITY: MENTAL HEALTH: DELUSIONS: PARANOID

## 2023-12-25 SDOH — HEALTH STABILITY: MENTAL HEALTH: BEHAVIORS/MOOD: ANXIOUS;HALLUCINATIONS;PARANOID

## 2023-12-25 SDOH — HEALTH STABILITY: MENTAL HEALTH: HALLUCINATION: AUDITORY

## 2023-12-25 SDOH — HEALTH STABILITY: MENTAL HEALTH: ACTIVE SUICIDAL IDEATION WITH SOME INTENT TO ACT, WITHOUT SPECIFIC PLAN (PAST 1 MONTH): YES

## 2023-12-25 SDOH — HEALTH STABILITY: MENTAL HEALTH: CONTENT: UNREMARKABLE

## 2023-12-25 SDOH — HEALTH STABILITY: MENTAL HEALTH: BEHAVIORS/MOOD: HOSTILE;IMPULSIVE;IRRITABLE;PARANOID

## 2023-12-25 SDOH — HEALTH STABILITY: MENTAL HEALTH: WISH TO BE DEAD (PAST 1 MONTH): YES

## 2023-12-25 SDOH — HEALTH STABILITY: MENTAL HEALTH: IN THE PAST WEEK, HAVE YOU BEEN HAVING THOUGHTS ABOUT KILLING YOURSELF?: YES

## 2023-12-25 SDOH — SOCIAL STABILITY: SOCIAL NETWORK: PARENT/GUARDIAN/SIGNIFICANT OTHER INVOLVEMENT: OTHER (COMMENT)

## 2023-12-25 SDOH — HEALTH STABILITY: MENTAL HEALTH: ACTIVE SUICIDAL IDEATION WITH SPECIFIC PLAN AND INTENT (PAST 1 MONTH): YES

## 2023-12-25 ASSESSMENT — LIFESTYLE VARIABLES
EVER FELT BAD OR GUILTY ABOUT YOUR DRINKING: NO
REASON UNABLE TO ASSESS: NO
EVER HAD A DRINK FIRST THING IN THE MORNING TO STEADY YOUR NERVES TO GET RID OF A HANGOVER: NO
SUBSTANCE_ABUSE_PAST_12_MONTHS: YES
HAVE PEOPLE ANNOYED YOU BY CRITICIZING YOUR DRINKING: NO
PRESCIPTION_ABUSE_PAST_12_MONTHS: NO
HAVE YOU EVER FELT YOU SHOULD CUT DOWN ON YOUR DRINKING: NO

## 2023-12-25 ASSESSMENT — COLUMBIA-SUICIDE SEVERITY RATING SCALE - C-SSRS
2. HAVE YOU ACTUALLY HAD ANY THOUGHTS OF KILLING YOURSELF?: YES
6. HAVE YOU EVER DONE ANYTHING, STARTED TO DO ANYTHING, OR PREPARED TO DO ANYTHING TO END YOUR LIFE?: YES
6. HAVE YOU EVER DONE ANYTHING, STARTED TO DO ANYTHING, OR PREPARED TO DO ANYTHING TO END YOUR LIFE?: YES
4. HAVE YOU HAD THESE THOUGHTS AND HAD SOME INTENTION OF ACTING ON THEM?: YES
1. IN THE PAST MONTH, HAVE YOU WISHED YOU WERE DEAD OR WISHED YOU COULD GO TO SLEEP AND NOT WAKE UP?: YES
5. HAVE YOU STARTED TO WORK OUT OR WORKED OUT THE DETAILS OF HOW TO KILL YOURSELF? DO YOU INTEND TO CARRY OUT THIS PLAN?: YES

## 2023-12-25 ASSESSMENT — PAIN DESCRIPTION - DESCRIPTORS: DESCRIPTORS: ACHING

## 2023-12-25 ASSESSMENT — PAIN DESCRIPTION - PAIN TYPE: TYPE: ACUTE PAIN

## 2023-12-25 ASSESSMENT — PAIN - FUNCTIONAL ASSESSMENT: PAIN_FUNCTIONAL_ASSESSMENT: 0-10

## 2023-12-25 ASSESSMENT — PAIN SCALES - GENERAL: PAINLEVEL_OUTOF10: 2

## 2023-12-25 ASSESSMENT — PAIN DESCRIPTION - ORIENTATION: ORIENTATION: RIGHT;LEFT

## 2023-12-25 ASSESSMENT — PAIN DESCRIPTION - LOCATION: LOCATION: FOOT

## 2023-12-25 NOTE — PROGRESS NOTES
EPAT - Social Work Psychiatric Assessment    Arrival Details  Mode of Arrival: Ambulance  Admission Source: Home  Admission Type: Involuntary  EPAT Assessment Start Date: 12/25/23  EPAT Assessment Start Time: 1645  Name of : Iqra Jiménez. LPC    History of Present Illness  Admission Reason: SA  HPI: Pt is 58 years old  male who presented to the ED after SA. Pt was pink slipped by PD. Pt has a history of MANUEL, depression, and substance use disorder. Pt has a history of inpatient psychiatric admissions, with the most recent one at [facility] in September 2023. Pt’s chart, ED provider, and nursing notes were reviewed, which indicates that “earlier PD reports suicide attempt, this patient attempted to cut himself in his wrist in front of them. Patient cut himself this morning with a  knife and a razor in his right wrist.” Pt’s BAL was negative, and UDS was positive for cocaine.    SW Readmission Information   Readmission within 30 Days: No    Psychiatric Symptoms  Anxiety Symptoms: Generalized  Depression Symptoms: No problems reported or observed.  [place]ona Symptoms: Flight of ideas, Increased energy, Less need to sleep, Poor judgment, Pressured speech, Psychomotor agitation    Psychosis Symptoms  Hallucination Type: Auditory, Command  Delusion Type: No problems reported or observed.    Additional Symptoms - Adult  Generalized Anxiety Disorder: Difficult to control worry, Difficulity concentrating, Excessive anxiety/worry, Irritability, Sleep disturbance  Obsessive Compulsive Disorder: No problems reported or observed.  Panic Attack: No problems reported or observed.  Post Traumatic Stress Disorder: No problems reported or observed.  Delirium: No problems reported or observed.    Past Psychiatric History/Meds/Treatments  Past Psychiatric History: MANUEL, depression and substance use disorder  Past Psychiatric Meds/Treatments: [facility], 09/2023, [facility] 9/2023; Per chart- [facility] location 5/2022;  "[facility] 10/2016; one prior with unk details  Past Violence/Victimization History: unknown    Current Mental Health Contacts   Name/Phone Number: denied   Last Appointment Date: n/a  Provider Name/Phone Number: denied  Provider Last Appointment Date: n/a    Support System: Other (Comment) (nobody)    Living Arrangement: Apartment, Lives alone    Home Safety  Feels Safe Living in Home: Yes    Income Information  Employment Status for: Patient  Employment Status: Other (Comment) (unknown)    [place] Service/Education History  Current or Previous  Service: None         Legal  Legal Considerations: Patient/ Family Capacity to Make Sound Judgments  Criminal Activity/ Legal Involvement Pertinent to Current Situation/ Hospitalization: none reported  Legal Comments: Per chart- hx of theft and drug possession charges    Drug Screening  Have you used any substances (canabis, cocaine, heroin, hallucinogens, inhalants, etc.) in the past 12 months?: Yes  Have you used any prescription drugs other than prescribed in the past 12 months?: No  Is a toxicology screen needed?: Yes    Stage of Change  Stage of Change: Contemplation  History of Treatment: Inpatient, Other (Comment) (unknown location, \"sometime before 2016\")  Type of Treatment Offered: Inpatient  Treatment Offered: Declined  Duration of Substance Use: per chart, Crack- 20 years; Meth- 1-2 years  Frequency of Substance Use: per chart, weekly Meth and/ or crack use    Psychosocial  Psychosocial (WDL): Exceptions to WDL  Behaviors/Mood: Anxious, Appropriate for age, Cooperative, Flight of ideas, Hallucinations, Impulsive, Irritable, Pacing  Affect: Inconsistent with thought content  Parent/Guardian/Significant Other Involvement: No involvement    Orientation  Orientation Level: Oriented X4    General Appearance  Motor Activity: Restlessness  Speech Pattern: Pressured, Rapid  General Attitude: Cooperative  Appearance/Hygiene: " Disheveled    Thought Process  Coherency: Disorganized, Flight of ideas  Content: Blaming others  Perception: Hallucinations  Hallucination: Auditory, Command  Judgment/Insight: Poor  Confusion: None  Cognition: Appropriate for developmental age, Follows commands, Poor attention/concentration, Poor safety awareness, Poor judgement    Sleep Pattern  Sleep Pattern: Difficulty falling asleep, Disturbed/interrupted sleep    Risk Factors  Self Harm/Suicidal Ideation Plan: SA via cutting this morning  Previous Self Harm/Suicidal Plans: SA via cutting 09/2023 and via OD in 2022, 2016 and 2008  Risk Factors: Age < 19 years old, Command hallucinations, Lower socioeconomic status, Major mental illness, Male, Substance abuse    Violence Risk Assessment  Assessment of Violence: None noted  Thoughts of Harm to Others: No - not currently/within last 6 months    Ability to Assess Risk Screen  Risk Screen - Ability to Assess: Able to be screened  Ask Suicide-Screening Questions  1. In the past few weeks, have you wished you were dead?: Yes  2. In the past few weeks, have you felt that you or your family would be better off if you were dead?: No  3. In the past week, have you been having thoughts about killing yourself?: Yes  4. Have you ever tried to kill yourself?: Yes  How did you try to kill yourself?: SA via cutting 09/2023 and via OD in 2022, 2016 and 2008  When did you try to kill yourself?: SA via cutting 09/2023 and via OD in 2022, 2016 and 2008  5. Are you having thoughts of killing yourself right now?: Yes  Describe your thoughts of killing yourself right now:: cutting  Calculated Risk Score: Imminent Risk  [place] Suicide Severity Rating Scale (Screener/Recent Self-Report)  1. Wish to be Dead (Past 1 Month): Yes  2. Non-Specific Active Suicidal Thoughts (Past 1 Month): Yes  3. Active Suicidal Ideation with any Methods (Not Plan) Without Intent to Act (Past 1 Month): Yes  4. Active Suicidal Ideation with Some Intent to  Act, Without Specific Plan (Past 1 Month): Yes  5. Active Suicidal Ideation with Specific Plan and Intent (Past 1 Month): Yes  6. Suicidal Behavior (Lifetime): Yes  6. Suicidal Behavior (3 Months): Yes  6. Suicidal Behavior (Description): SA via cutting 09/2023 and via OD in 2022, 2016 and 2008  Calculated C-SSRS Risk Score (Lifetime/Recent): High Risk  Step 1: Risk Factors  Current & Past Psychiatric Dx: Mood disorder, Alcohol/substance abuse disorders  Presenting Symptoms: Impulsivity, Anxiety and/or panic, Command hallucinations, Psychosis  Precipitants/Stressors: Triggering events leading to humiliation, shame, and/or despair (e.g. loss of relationship, financial or health status) (real or anticipated), Substance intoxication or withdrawal, Inadequate social supports, Social isolation  Change in Treatment: Non-compliant or not receiving treatment  Access to Lethal Methods : No  Step 2: Protective Factors   Protective Factors Internal: Fear of death or the actual act of killing self, Identifies reasons for living  Protective Factors External: Other (Comment) (none)  Step 3: Suicidal Ideation Intensity  Most Severe Suicidal Ideation Identified: SA  How Many Times Have You Had These Thoughts: Daily or almost daily  When You Have the Thoughts How Long do They Last : Less than 1 hour/some of the time  Could/Can You Stop Thinking About Killing Yourself or Wanting to Die if You Want to: Unable to control thoughts  Are There Things - Anyone or Anything - That Stopped You From Wanting to Die or Acting on: Deterrents most definitely did not stop you  What Sort of Reasons Did You Have For Thinking About Wanting to Die or Killing Yourself: Does not apply  Total Score: 16  Step 5: Documentation  Risk Level: High suicide risk    Psychiatric Impression and Plan of Care  Assessment and Plan: Pt is 58 years old  male with a history of MANUEL, depression, and substance use disorder brought to the ED after SA. Pt has a  "history of inpatient psychiatric admissions, with the most recent one [facility]. During the assessment, pt was passing around the room, dressed in hospital attire. Pt was anxious, in an elevated mood, and cooperative. Pt had pressured speech and flight of ideas. Pt required redirection. Pt appears to be manic. Pt stated that he is \"in a good mood” today. Pt stated that “Salvador Mace” was accusing him of stealing money from him and that “he was sitting alone in his apartment whispering that he is going to go to a courthouse tomorrow and report that Salvador is lying. As soon as I said it, the psych unit was at my door. I think Salvador was listening by my door.\" In addition, pt reported “hearing voices.\" Those voices told him to “slit his wrists.\" Pt is not compliant with his medications. PT reported using “cocaine and meth 2 days ago.\" Pt’s UDS was positive for cocaine. Pt is high-risk on the [place] SSRS. Pt is still endorsing SI. Pt has a history of SA via cutting this morning and in 09/2023 and via OD in 2022, 2016 and 2008. Pt was not able to identify any support system. Pt was able to identify limited protective factors: reasons to stay alive (“because I have to and I want to”), and fear of dying. Pt was future-oriented: “I hope to move out of that place because I cannot live next to Salvador Mace.\" Pt denied access to firearms. Pt denied HI, VH.     At this time, pt meets the criteria for inpatient psychiatric admission for further evaluation, stability, treatment, and safety. Reviewed with Dr. Huizar, who is in agreement.    Specific Resources Provided to Patient: inpatient admission  CM Notified: n/a  PHP/IOP Recommended: none    Outcome/Disposition  Patient's Perception of Outcome Achieved: unknown  Assessment, Recommendations and Risk Level Reviewed with: Dr. Huizar  Contact Name: none provided  EPAT Assessment Completed Date: 12/25/23  EPAT Assessment Completed Time: 1710      "

## 2023-12-25 NOTE — ED PROVIDER NOTES
HPI   Chief Complaint   Patient presents with    Psychiatric Evaluation    Suicide Attempt     Pink slipped by [employer]  for using  knife and razor blades to slit wrists. EMS called for male coming down off crack. Pt admits to using crack but does not remember when he last used.        Patient is a 58-year-old male presenting to the ED for psychiatric evaluation.  Patient was pink slipped by Meag[employer] LEPE.  Earlier PD reports suicide attempt this patient attempted to cut himself in his wrist in front of them.  Patient cut himself this morning with a  knife and a razor in his right wrist.  Patient is not up-to-date on tetanus. Does have a history of previous suicide attempts which included the use of medications.  Patient does have a history of anxiety, depression, schizophrenia and bipolar.  States that he is not taking his medication because he does not want to.  States that he does hear voices in his hand but will not elaborate to the voices that he hears and begins to become emotional.  No visual hallucinations.  Most recent drug use including methamphetamine 2 days ago.  No alcohol use.  No homicidal ideation.  No chest pain or shortness of breath.  No abdominal pain, nausea, vomiting or diarrhea.  No fever or chills.                           No data recorded                Patient History   Past Medical History:   Diagnosis Date    Disorder of the skin and subcutaneous tissue, unspecified 06/28/2019    Skin sore    Encounter for examination of eyes and vision without abnormal findings 07/22/2021    Eye exam, routine    Encounter for general adult medical examination without abnormal findings 07/27/2022    Encounter for Medicare annual wellness exam    Encounter for immunization     Encounter for immunization    Encounter for screening for malignant neoplasm of colon 06/02/2021    Encounter for colorectal cancer screening    Encounter for screening for malignant neoplasm of prostate 07/22/2021    Encounter  for prostate cancer screening    Hiccough     Hiccups    Human immunodeficiency virus (HIV) disease (CMS/HCC)     HIV (human immunodeficiency virus infection)    Other spondylosis with radiculopathy, lumbar region     Osteoarthritis of spine with radiculopathy, lumbar region    Personal history of other diseases of the musculoskeletal system and connective tissue 06/28/2019    History of fibromyalgia    Personal history of other diseases of the respiratory system     History of asthma    Personal history of other endocrine, nutritional and metabolic disease     History of vitamin D deficiency    Personal history of other infectious and parasitic diseases 09/23/2020    History of candidiasis of mouth    Personal history of other specified conditions 06/28/2019    History of chronic pain    Personal history of other specified conditions 02/10/2021    History of insomnia     Past Surgical History:   Procedure Laterality Date    OTHER SURGICAL HISTORY  11/10/2020    Foot surgery     No family history on file.  Social History     Tobacco Use    Smoking status: Every Day     Packs/day: 2     Types: Cigarettes     Passive exposure: Current    Smokeless tobacco: Never   Vaping Use    Vaping Use: Never used   Substance Use Topics    Alcohol use: Not Currently    Drug use: Not Currently       Physical Exam   ED Triage Vitals [12/25/23 1528]   Temp Heart Rate Resp BP   36.1 °C (97 °F) 61 18 102/74      SpO2 Temp Source Heart Rate Source Patient Position   96 % Temporal Monitor --      BP Location FiO2 (%)     -- --       Physical Exam  Vitals reviewed.   Constitutional:       Appearance: Normal appearance.   HENT:      Head: Normocephalic.      Nose: Nose normal.      Mouth/Throat:      Mouth: Mucous membranes are moist.      Pharynx: Oropharynx is clear.   Eyes:      Extraocular Movements: Extraocular movements intact.      Pupils: Pupils are equal, round, and reactive to light.   Cardiovascular:      Rate and Rhythm: Normal  rate and regular rhythm.      Pulses: Normal pulses.      Heart sounds: Normal heart sounds.   Pulmonary:      Effort: Pulmonary effort is normal.      Breath sounds: Normal breath sounds.   Musculoskeletal:         General: Normal range of motion.      Cervical back: Normal range of motion.   Skin:     General: Skin is warm.      Capillary Refill: Capillary refill takes less than 2 seconds.      Comments: Two 2 cm lacerations along the ventral surface of the right wrist.  Partial-thickness involved in both lacerations.  Bleeding controlled.  No obvious tendon, muscle or fascia injury.  Wrist flexion extension and pronation supination intact.  Radial pulse 2+.  Cap refill less than 2 seconds.   Neurological:      General: No focal deficit present.      Mental Status: He is alert.   Psychiatric:         Attention and Perception: He perceives auditory hallucinations. He does not perceive visual hallucinations.         Mood and Affect: Mood is depressed. Affect is tearful. Affect is not blunt or angry.         Speech: Speech normal.         Behavior: Behavior is slowed.         Thought Content: Thought content includes suicidal ideation.         Judgment: Judgment is inappropriate.       Labs Reviewed   COMPREHENSIVE METABOLIC PANEL - Abnormal       Result Value    Glucose 81      Sodium 135 (*)     Potassium 3.9      Chloride 101      Bicarbonate 29      Anion Gap 9 (*)     Urea Nitrogen 22      Creatinine 1.06      eGFR 82      Calcium 9.3      Albumin 4.1      Alkaline Phosphatase 84      Total Protein 7.0      AST 16      Bilirubin, Total 0.6      ALT 16     DRUG SCREEN,URINE - Abnormal    Amphetamine Screen, Urine Presumptive Positive (*)     Barbiturate Screen, Urine Presumptive Negative      Benzodiazepines Screen, Urine Presumptive Negative      Cannabinoid Screen, Urine Presumptive Negative      Cocaine Metabolite Screen, Urine Presumptive Positive (*)     Fentanyl Screen, Urine Presumptive Negative       Opiate Screen, Urine Presumptive Negative      Oxycodone Screen, Urine Presumptive Negative      PCP Screen, Urine Presumptive Negative      Narrative:     Drug screen results are presumptive and should not be used to assess   compliance with prescribed medication. Contact the performing [facility] laboratory   to add-on definitive confirmatory testing if clinically indicated.    Toxicology screening results are reported qualitatively. The concentration must   be greater than or equal to the cutoff to be reported as positive. The concentration   at which the screening test can detect an individual drug or metabolite varies.   The absence of expected drug(s) and/or drug metabolite(s) may indicate non-compliance,   inappropriate timing of specimen collection relative to drug administration, poor drug   absorption, diluted/adulterated urine, or limitations of testing. For medical purposes   only; not valid for forensic use.    Interpretive questions should be directed to the laboratory medical directors.   URINALYSIS WITH REFLEX MICROSCOPIC - Abnormal    Color, Urine Yellow      Appearance, Urine Clear      Specific Gravity, Urine 1.020      pH, Urine 6.0      Protein, Urine NEGATIVE      Glucose, Urine NEGATIVE      Blood, Urine SMALL (1+) (*)     Ketones, Urine 5 (TRACE) (*)     Bilirubin, Urine NEGATIVE      Urobilinogen, Urine 4.0 (*)     Nitrite, Urine NEGATIVE      Leukocyte Esterase, Urine NEGATIVE     MICROSCOPIC ONLY, URINE - Abnormal    WBC, Urine 1-5      RBC, Urine 3-5      Mucus, Urine 1+      Hyaline Casts, Urine OCCASIONAL (*)    ACUTE TOXICOLOGY PANEL, BLOOD - Normal    Acetaminophen <10.0      Salicylate  <3      Alcohol <10     CREATINE KINASE - Normal    Creatine Kinase 38     SARS-COV-2 AND INFLUENZA A/B PCR - Normal    Flu A Result Not Detected      Flu B Result Not Detected      Coronavirus 2019, PCR Not Detected      Narrative:     This assay has received FDA Emergency Use Authorization (EUA) and  is  only authorized for the duration of time that circumstances exist to justify the authorization of the emergency use of in vitro diagnostic tests for the detection of SARS-CoV-2 virus and/or diagnosis of COVID-19 infection under section 564(b)(1) of the Act, 21 U.S.C. 360bbb-3(b)(1). Testing for SARS-CoV-2 is only recommended for patients who meet current clinical and/or epidemiological criteria as defined by federal, state, or local public health directives. This assay is an in vitro diagnostic nucleic acid amplification test for the qualitative detection of SARS-CoV-2, Influenza A, and Influenza B from nasopharyngeal specimens and has been validated for use at [facility]. Negative results do not preclude COVID-19 infections or Influenza A/B infections, and should not be used as the sole basis for diagnosis, treatment, or other management decisions. If Influenza A/B and RSV PCR results are negative, testing for Parainfluenza virus, Adenovirus and Metapneumovirus is routinely performed for [facility] [place] in[place], and is available on other patients by placing an add-on request.    CBC WITH AUTO DIFFERENTIAL    WBC 6.6      nRBC 0.0      RBC 5.02      Hemoglobin 15.1      Hematocrit 46.2      MCV 92      MCH 30.1      MCHC 32.7      RDW 13.1      Platelets 187      Neutrophils % 63.4      Immature Granulocytes %, Automated 0.3      Lymphocytes % 25.0      Monocytes % 8.2      Eosinophils % 2.6      Basophils % 0.5      Neutrophils Absolute 4.18      Immature Granulocytes Absolute, Automated 0.02      Lymphocytes Absolute 1.65      Monocytes Absolute 0.54      Eosinophils Absolute 0.17      Basophils Absolute 0.03         ED Course & MDM   Diagnoses as of 01/15/24 2211   Laceration of right wrist, initial encounter   Auditory hallucinations       Medical Decision Making  Independent Historians:  patient    MDM:   58-year-old male presenting to the ED for psychiatric  evaluation.  Patient was pink slipped by Meaghan LEPE after attempting self-harm in front of the police department.  Patient has a history of anxiety, depression, schizophrenia and bipolar.  Patient states that he does not want to take his medications because he does not feel like taking them.  He reports auditory hallucinations but when asking what those lesions are telling him he becomes tearful and does not elaborate.  No visible hallucinations.  Does have a previous history of suicidal attempts which include attempting to overdose on medications.  Today the patient cut his arm with a  knife and a razor.  He is up-to-date on tetanus.  Denies pain.  Bleeding is controlled.  On exam patient is resting comfortably.  He does have dried blood along his right wrist.  There is 2 2 cm lacerations with partial-thickness lacerations along the right lower wrist.  No obvious tendon, fascial or muscle injury.  Full active range of motion of the right wrist.  Radial pulse 2+.  Cap refill less than 2 seconds.  Discussed with the patient that his  lacerations will need to be repaired via suture.  The patient states that he does not want sutures and 5 placed sutures he will repeat sutures out.  I also offered the patient Steri-Strips in which she declined Steri-Strips.  After the nursing staff cleansed the wounds with normal saline and Hibiclens patient then proceeded to rip off the occlusive dressing and states that he does not want it to be wrapped.  Discussed with patient the complications if I do not close a laceration and potential for infection, poor wound healing or even scar and patient states that he is okay with this.    My interpretation of EKG performed at 1613 suggest normal sinus rhythm with a ventricular rate of 62 and no ST elevations.    Labs were ordered to further evaluate medically cleared the patient.  My interpretation of labs suggest CBC with no leukocytosis or anemia.  CMP suggest no [place]  abnormalities, RITA or liver injury.  Acute toxicology blood panel unremarkable.  CK unremarkable.  COVID and flu not detected.  Urinalysis suggest no signs of UTI.  Urine drug screen positive for protamines and cocaine.  At this time patient is medically cleared for EPAT evaluation.    EPAT did evaluate the patient after discussion with EPAT they believe that patient would benefit from hospital admission and further psychiatric placement.   Pending EPAT placement patient be handed off to Bakari Baker patient manage department at approximately 10 PM.  Under my care patient remained calm and cooperative.    DDx/Differential:   laceration, suicidal ideation, hallucinations    Diagnosis: laceration to right wrist, auditory hallucinations        Dictation Disclaimer: Due to voice recognition software, sound alike and misspelled words may be contained in documentation. If you have any questions please contact me.          HPI was reviewed, physical exam was performed, MDM was reviewed and the treatment plan  Procedure  Procedures     Mir Vega PA-C  12/25/23 6448       Nancy Huizar MD  01/15/24 0556

## 2023-12-26 VITALS
OXYGEN SATURATION: 97 % | RESPIRATION RATE: 14 BRPM | BODY MASS INDEX: 28.14 KG/M2 | HEART RATE: 75 BPM | HEIGHT: 69 IN | TEMPERATURE: 97.5 F | WEIGHT: 190 LBS | DIASTOLIC BLOOD PRESSURE: 70 MMHG | SYSTOLIC BLOOD PRESSURE: 103 MMHG

## 2023-12-26 SDOH — HEALTH STABILITY: MENTAL HEALTH: NEEDS EXPRESSED: DENIES

## 2023-12-26 SDOH — HEALTH STABILITY: MENTAL HEALTH: CONTENT: BLAMING OTHERS

## 2023-12-26 SDOH — HEALTH STABILITY: MENTAL HEALTH: HAVE YOU ACTUALLY HAD ANY THOUGHTS OF KILLING YOURSELF?: YES

## 2023-12-26 SDOH — HEALTH STABILITY: MENTAL HEALTH: RISK OF SUICIDE: HIGH RISK

## 2023-12-26 SDOH — SOCIAL STABILITY: SOCIAL NETWORK: VISITOR BEHAVIORS: UNABLE TO ASSESS

## 2023-12-26 SDOH — HEALTH STABILITY: MENTAL HEALTH: HAVE YOU EVER DONE ANYTHING, STARTED TO DO ANYTHING, OR PREPARED TO DO ANYTHING TO END YOUR LIFE?: YES

## 2023-12-26 SDOH — SOCIAL STABILITY: SOCIAL NETWORK: PARENT/GUARDIAN/SIGNIFICANT OTHER INVOLVEMENT: NO INVOLVEMENT

## 2023-12-26 SDOH — HEALTH STABILITY: MENTAL HEALTH: BEHAVIORS/MOOD: FLIGHT OF IDEAS;HALLUCINATIONS;IRRITABLE;PARANOID;TEARFUL

## 2023-12-26 SDOH — HEALTH STABILITY: MENTAL HEALTH: HAVE YOU HAD THESE THOUGHTS AND HAD SOME INTENTION OF ACTING ON THEM?: YES

## 2023-12-26 SDOH — HEALTH STABILITY: MENTAL HEALTH: BEHAVIORS/MOOD: SLEEPING

## 2023-12-26 SDOH — HEALTH STABILITY: MENTAL HEALTH: CONTENT: UNREMARKABLE

## 2023-12-26 SDOH — HEALTH STABILITY: MENTAL HEALTH: HAVE YOU WISHED YOU WERE DEAD OR WISHED YOU COULD GO TO SLEEP AND NOT WAKE UP?: YES

## 2023-12-26 SDOH — SOCIAL STABILITY: SOCIAL INSECURITY: FAMILY BEHAVIORS: UNABLE TO ASSESS

## 2023-12-26 SDOH — HEALTH STABILITY: MENTAL HEALTH: HAVE YOU BEEN THINKING ABOUT HOW YOU MIGHT DO THIS?: YES

## 2023-12-26 SDOH — HEALTH STABILITY: MENTAL HEALTH: HALLUCINATION: AUDITORY

## 2023-12-26 SDOH — HEALTH STABILITY: MENTAL HEALTH: SUICIDE ASSESSMENT: ADULT (C-SSRS)

## 2023-12-26 SDOH — HEALTH STABILITY: MENTAL HEALTH: WAS THIS WITHIN THE PAST THREE MONTHS?: YES

## 2023-12-26 SDOH — HEALTH STABILITY: MENTAL HEALTH: DELUSIONS: CONTROLLED

## 2023-12-26 SDOH — SOCIAL STABILITY: SOCIAL NETWORK: EMOTIONAL SUPPORT GIVEN: REASSURE

## 2023-12-26 SDOH — HEALTH STABILITY: MENTAL HEALTH: NEEDS EXPRESSED: EMOTIONAL

## 2023-12-26 SDOH — SOCIAL STABILITY: SOCIAL INSECURITY: FAMILY BEHAVIORS: UNABLE TO ASSESS;VERBAL

## 2023-12-26 SDOH — HEALTH STABILITY: MENTAL HEALTH: SLEEP PATTERN: SLEEPS ALL NIGHT

## 2023-12-26 SDOH — HEALTH STABILITY: MENTAL HEALTH

## 2023-12-26 NOTE — SIGNIFICANT EVENT
Application for Emergency Admission      Ready for Transfer?  Is the patient medically cleared for transfer to inpatient psychiatry: Yes  Has the patient been accepted to an inpatient psychiatric hospital: Yes    Application for Emergency Admission  IN ACCORDANCE WITH SE[place] 5122[place]10 [place]  The Chief Clinical Officer of: Cassandra Delvalle 12/26/2023 .12:57 AM    Reason for Hospitalization  The undersigned has reason to believe that: Christofer Walter Is a mentally ill person subject to hospitalization by court order under division B Section 5122.01 of the Revised Code, i.e., this person:    1.Yes  Represents a substantial risk of physical harm to self as manifested by evidence of threats of, or attempts at, suicide or serious self-inflicted bodily harm    2.No Represents a substantial risk of physical harm to others as manifested by evidence of recent homicidal or other violent behavior, evidence of recent threats that place another in reasonable fear of violent behavior and serious physical harm, or other evidence of present dangerousness    3.No Represents a substantial and immediate risk of serious physical impairment or injury to self as manifested by  evidence that the person is unable to provide for and is not providing for the person's basic physical needs because of the person's mental illness and that appropriate provision for those needs cannot be made  immediately available in the community    4.Yes Would benefit from treatment in a hospital for his mental illness and is in need of such treatment as manifested by evidence of behavior that creates a grave and imminent risk to substantial rights of others or  himself.    5.Yes Would benefit from treatment as manifested by evidence of behavior that indicates all of the following:       (a) The person is unlikely to survive safely in the community without supervision, based on a clinical determination.       (b) The person has a history of lack of compliance with  treatment for mental illness and one of the following applies:      (i) At least twice within the thirty-six months prior to the filing of an affidavit seeking court-ordered treatment of the person under section 5122.111 of the Revised Code, the lack of compliance has been a significant factor in necessitating hospitalization in a hospital or receipt of services in a forensic or other mental health unit of a correctional facility, provided that the thirty-six-month period shall be extended by the length of any hospitalization or incarceration of the person that occurred within the thirty-six-month period.      (ii) Within the forty-eight months prior to the filing of an affidavit seeking court-ordered treatment of the person under section 5122.111 of the Revised Code, the lack of compliance resulted in one or more acts of serious violent behavior toward self or others or threats of, or attempts at, serious physical harm to self or others, provided that the forty-eight-month period shall be extended by the length of any hospitalization or incarceration of the person that occurred within the forty-eight-month period.      (c) The person, as a result of mental illness, is unlikely to voluntarily participate in necessary treatment.       (d) In view of the person's treatment history and current behavior, the person is in need of treatment in order to prevent a relapse or deterioration that would be likely to result in substantial risk of serious harm to the person or others.    (e) Represents a substantial risk of physical harm to self or others if allowed to remain at liberty pending examination.    Therefore, it is requested that said person be admitted to the above named facility.    STATEMENT OF BELIEF    Must be filled out by one of the following: a psychiatrist, licensed physician, licensed clinical psychologist, health or ,  or .  (Statement shall include the circumstances under  which the individual was taken into custody and the reason for the person's belief that hospitalization is necessary. The statement shall also include a reference to efforts made to secure the individual's property at his residence if he was taken into custody there. Every reasonable and appropriate effort should be made to take this person into custody in the least conspicuous manner possible.)    Suicidal with intent and would benefit from inpatient psychiatric management     Mitul Michelle MD 12/26/2023     _____________________________________________________________   Place of Employment: [facility]     STATEMENT OF OBSERVATION BY PSYCHIATRIST, LICENSED PHYSICIAN, OR LICENSED CLINICAL PSYCHOLOGIST, IF APPLICABLE    Place of Observation (e.g., [facility] mental health center, general hospital, office, emergency facility)  (If applicable, please complete)    Mitul Michelle MD 12/26/2023    _____________________________________________________________

## 2023-12-26 NOTE — PROGRESS NOTES
Emergency Medicine Transition of Care Note.    I received Christofer Walter in signout from Mir MATIAS.  Please see the previous ED provider note for all HPI, PE and MDM up to the time of signout at 2200. This is in addition to the primary record.    In brief Christofer Walter is an 58 y.o. male presenting for   Chief Complaint   Patient presents with    Psychiatric Evaluation    Suicide Attempt     Pink slipped by [employer]PD  for using  knife and razor blades to slit wrists. EMS called for male coming down off crack. Pt admits to using crack but does not remember when he last used.      At the time of signout we were awaiting: EPAT placement    Diagnoses as of 01/15/24 2213   Laceration of right wrist, initial encounter   Auditory hallucinations       Medical Decision Making  The patient is to be transferred to [facility] awaiting a bed.  Care turned over to Chivo Stone PA-C. Waiting for bed assignment    Final diagnoses:   [S61.511A] Laceration of right wrist, initial encounter   [R44.0] Auditory hallucinations       HPI was reviewed physical exam and labs MDM per Dr. Hall    Procedure  Procedures    Nancy Huizar MD

## 2023-12-26 NOTE — ED PROVIDER NOTES
Seen by previous provider and medically cleared. Pink slipped now with plans to transfer to [facility]      Mitul Michelle MD  12/26/23 0052

## 2024-01-02 ENCOUNTER — TELEPHONE (OUTPATIENT)
Dept: INFECTIOUS DISEASES | Age: 59
End: 2024-01-02

## 2024-01-02 NOTE — TELEPHONE ENCOUNTER
Pt called CM, informed her that he is at [facility].  Pt was talking about hearing people talk about him and that he has a Court hearing on 1/2/24 so that he is mandated to stay at facility.  Pt's speech was difficult to understand.  Pt was naming different people and what they have said or done.  CM provided active listening.  CM will follow up with pt as needed.  Cm will inform RN of placement.

## 2024-01-03 ENCOUNTER — TELEPHONE (OUTPATIENT)
Dept: INFECTIOUS DISEASES | Age: 59
End: 2024-01-03

## 2024-01-03 NOTE — TELEPHONE ENCOUNTER
Per request, Cm scheduled transport from [facility] to place of residence on 1/4/24 @ 12:30PM.  CM assessed need, arrangements as last resort.  Pt is aware of Transportation Policy.     1/4/24 @ 12:30PM  [facility]  [address] [address]    Destination  Pt's residence  [address] [place] [address], [address] [place] [id number]

## 2024-01-10 ENCOUNTER — APPOINTMENT (OUTPATIENT)
Dept: RADIOLOGY | Facility: HOSPITAL | Age: 59
End: 2024-01-10
Payer: MEDICARE

## 2024-01-10 ENCOUNTER — HOSPITAL ENCOUNTER (EMERGENCY)
Facility: HOSPITAL | Age: 59
Discharge: HOME | End: 2024-01-10
Payer: MEDICARE

## 2024-01-10 VITALS
TEMPERATURE: 97.3 F | SYSTOLIC BLOOD PRESSURE: 118 MMHG | BODY MASS INDEX: 26.07 KG/M2 | HEIGHT: 69 IN | HEART RATE: 82 BPM | WEIGHT: 176 LBS | OXYGEN SATURATION: 99 % | RESPIRATION RATE: 18 BRPM | DIASTOLIC BLOOD PRESSURE: 72 MMHG

## 2024-01-10 DIAGNOSIS — L03.115 CELLULITIS OF RIGHT FOOT: Primary | ICD-10-CM

## 2024-01-10 LAB
ALBUMIN SERPL BCP-MCNC: 4.2 G/DL (ref 3.4–5)
ALP SERPL-CCNC: 91 U/L (ref 33–120)
ALT SERPL W P-5'-P-CCNC: 16 U/L (ref 10–52)
ANION GAP SERPL CALC-SCNC: 10 MMOL/L (ref 10–20)
AST SERPL W P-5'-P-CCNC: 15 U/L (ref 9–39)
ATRIAL RATE: 62 BPM
BASOPHILS # BLD AUTO: 0.01 X10*3/UL (ref 0–0.1)
BASOPHILS NFR BLD AUTO: 0.2 %
BILIRUB SERPL-MCNC: 0.5 MG/DL (ref 0–1.2)
BUN SERPL-MCNC: 18 MG/DL (ref 6–23)
CALCIUM SERPL-MCNC: 9.8 MG/DL (ref 8.6–10.3)
CHLORIDE SERPL-SCNC: 102 MMOL/L (ref 98–107)
CO2 SERPL-SCNC: 27 MMOL/L (ref 21–32)
CREAT SERPL-MCNC: 1.3 MG/DL (ref 0.5–1.3)
CRP SERPL-MCNC: 6.53 MG/DL
EGFRCR SERPLBLD CKD-EPI 2021: 64 ML/MIN/1.73M*2
EOSINOPHIL # BLD AUTO: 0.15 X10*3/UL (ref 0–0.7)
EOSINOPHIL NFR BLD AUTO: 2.5 %
ERYTHROCYTE [DISTWIDTH] IN BLOOD BY AUTOMATED COUNT: 13.2 % (ref 11.5–14.5)
GLUCOSE SERPL-MCNC: 106 MG/DL (ref 74–99)
HCT VFR BLD AUTO: 41.6 % (ref 41–52)
HGB BLD-MCNC: 13.7 G/DL (ref 13.5–17.5)
IMM GRANULOCYTES # BLD AUTO: 0.01 X10*3/UL (ref 0–0.7)
IMM GRANULOCYTES NFR BLD AUTO: 0.2 % (ref 0–0.9)
LACTATE SERPL-SCNC: 0.5 MMOL/L (ref 0.4–2)
LYMPHOCYTES # BLD AUTO: 1.31 X10*3/UL (ref 1.2–4.8)
LYMPHOCYTES NFR BLD AUTO: 21.9 %
MCH RBC QN AUTO: 30.4 PG (ref 26–34)
MCHC RBC AUTO-ENTMCNC: 32.9 G/DL (ref 32–36)
MCV RBC AUTO: 92 FL (ref 80–100)
MONOCYTES # BLD AUTO: 0.52 X10*3/UL (ref 0.1–1)
MONOCYTES NFR BLD AUTO: 8.7 %
NEUTROPHILS # BLD AUTO: 3.98 X10*3/UL (ref 1.2–7.7)
NEUTROPHILS NFR BLD AUTO: 66.5 %
NRBC BLD-RTO: 0 /100 WBCS (ref 0–0)
P AXIS: 53 DEGREES
P OFFSET: 217 MS
P ONSET: 158 MS
PLATELET # BLD AUTO: 201 X10*3/UL (ref 150–450)
POTASSIUM SERPL-SCNC: 3.7 MMOL/L (ref 3.5–5.3)
PR INTERVAL: 138 MS
PROT SERPL-MCNC: 8 G/DL (ref 6.4–8.2)
Q ONSET: 227 MS
QRS COUNT: 10 BEATS
QRS DURATION: 84 MS
QT INTERVAL: 416 MS
QTC CALCULATION(BAZETT): 422 MS
QTC FREDERICIA: 420 MS
R AXIS: 69 DEGREES
RBC # BLD AUTO: 4.5 X10*6/UL (ref 4.5–5.9)
SODIUM SERPL-SCNC: 135 MMOL/L (ref 136–145)
T AXIS: 65 DEGREES
T OFFSET: 435 MS
VENTRICULAR RATE: 62 BPM
WBC # BLD AUTO: 6 X10*3/UL (ref 4.4–11.3)

## 2024-01-10 PROCEDURE — 73630 X-RAY EXAM OF FOOT: CPT | Mod: RT

## 2024-01-10 PROCEDURE — 73630 X-RAY EXAM OF FOOT: CPT | Mod: RIGHT SIDE | Performed by: RADIOLOGY

## 2024-01-10 PROCEDURE — 80053 COMPREHEN METABOLIC PANEL: CPT | Performed by: PHYSICIAN ASSISTANT

## 2024-01-10 PROCEDURE — 99284 EMERGENCY DEPT VISIT MOD MDM: CPT

## 2024-01-10 PROCEDURE — 83605 ASSAY OF LACTIC ACID: CPT | Performed by: PHYSICIAN ASSISTANT

## 2024-01-10 PROCEDURE — 2500000001 HC RX 250 WO HCPCS SELF ADMINISTERED DRUGS (ALT 637 FOR MEDICARE OP): Performed by: PHYSICIAN ASSISTANT

## 2024-01-10 PROCEDURE — 36415 COLL VENOUS BLD VENIPUNCTURE: CPT | Performed by: PHYSICIAN ASSISTANT

## 2024-01-10 PROCEDURE — 86140 C-REACTIVE PROTEIN: CPT | Performed by: PHYSICIAN ASSISTANT

## 2024-01-10 PROCEDURE — 85025 COMPLETE CBC W/AUTO DIFF WBC: CPT | Performed by: PHYSICIAN ASSISTANT

## 2024-01-10 PROCEDURE — 99283 EMERGENCY DEPT VISIT LOW MDM: CPT | Mod: 25

## 2024-01-10 RX ORDER — CEPHALEXIN 500 MG/1
500 CAPSULE ORAL EVERY 6 HOURS
Qty: 28 CAPSULE | Refills: 0 | Status: SHIPPED | OUTPATIENT
Start: 2024-01-10 | End: 2024-01-17

## 2024-01-10 RX ORDER — CEPHALEXIN 500 MG/1
500 CAPSULE ORAL ONCE
Status: COMPLETED | OUTPATIENT
Start: 2024-01-10 | End: 2024-01-10

## 2024-01-10 RX ADMIN — CEPHALEXIN 500 MG: 500 CAPSULE ORAL at 05:01

## 2024-01-10 ASSESSMENT — LIFESTYLE VARIABLES
HAVE PEOPLE ANNOYED YOU BY CRITICIZING YOUR DRINKING: NO
HAVE YOU EVER FELT YOU SHOULD CUT DOWN ON YOUR DRINKING: NO
REASON UNABLE TO ASSESS: NO
EVER FELT BAD OR GUILTY ABOUT YOUR DRINKING: NO
EVER HAD A DRINK FIRST THING IN THE MORNING TO STEADY YOUR NERVES TO GET RID OF A HANGOVER: NO

## 2024-01-10 ASSESSMENT — COLUMBIA-SUICIDE SEVERITY RATING SCALE - C-SSRS
6. HAVE YOU EVER DONE ANYTHING, STARTED TO DO ANYTHING, OR PREPARED TO DO ANYTHING TO END YOUR LIFE?: NO
2. HAVE YOU ACTUALLY HAD ANY THOUGHTS OF KILLING YOURSELF?: NO
1. IN THE PAST MONTH, HAVE YOU WISHED YOU WERE DEAD OR WISHED YOU COULD GO TO SLEEP AND NOT WAKE UP?: NO

## 2024-01-10 ASSESSMENT — PAIN DESCRIPTION - ORIENTATION: ORIENTATION: RIGHT

## 2024-01-10 ASSESSMENT — PAIN DESCRIPTION - LOCATION: LOCATION: FOOT

## 2024-01-10 ASSESSMENT — PAIN - FUNCTIONAL ASSESSMENT: PAIN_FUNCTIONAL_ASSESSMENT: 0-10

## 2024-01-10 ASSESSMENT — PAIN SCALES - GENERAL: PAINLEVEL_OUTOF10: 8

## 2024-01-10 ASSESSMENT — PAIN DESCRIPTION - PAIN TYPE: TYPE: ACUTE PAIN

## 2024-01-10 NOTE — ED PROVIDER NOTES
HPI   Chief Complaint   Patient presents with    Foot Injury       58-year-old male with a history of COPD, HIV and psychiatric illness presenting to the ED today with some redness and pain and swelling to his right foot that he noticed a few days ago.  There was no fall or injury and he did nothing harm himself, denies any injection drug use.  He states that his foot has been a little painful to walk on and that is when he noticed it was red and swollen.  He denies fevers chills body aches, nausea or vomiting.  He is not having numbness or weakness in the foot, only endorsing pain worse when he puts weight on it.  He does have some swelling in the foot but no swelling or redness going up to the ankle without the rest of his right leg.  No further complaints at this time.      History provided by:  Patient                      Bahman Coma Scale Score: 15                  Patient History   Past Medical History:   Diagnosis Date    Disorder of the skin and subcutaneous tissue, unspecified 06/28/2019    Skin sore    Encounter for examination of eyes and vision without abnormal findings 07/22/2021    Eye exam, routine    Encounter for general adult medical examination without abnormal findings 07/27/2022    Encounter for Medicare annual wellness exam    Encounter for immunization     Encounter for immunization    Encounter for screening for malignant neoplasm of colon 06/02/2021    Encounter for colorectal cancer screening    Encounter for screening for malignant neoplasm of prostate 07/22/2021    Encounter for prostate cancer screening    Hiccoswapna     Hiccups    Human immunodeficiency virus (HIV) disease (CMS/[facility])     HIV (human immunodeficiency virus infection)    Other spondylosis with radiculopathy, lumbar region     Osteoarthritis of spine with radiculopathy, lumbar region    Personal history of other diseases of the musculoskeletal system and connective tissue 06/28/2019    History of fibromyalgia    Personal  history of other diseases of the respiratory system     History of asthma    Personal history of other endocrine, nutritional and metabolic disease     History of vitamin D deficiency    Personal history of other infectious and parasitic diseases 09/23/2020    History of candidiasis of mouth    Personal history of other specified conditions 06/28/2019    History of chronic pain    Personal history of other specified conditions 02/10/2021    History of insomnia     Past Surgical History:   Procedure Laterality Date    OTHER SURGICAL HISTORY  11/10/2020    Foot surgery     No family history on file.  Social History     Tobacco Use    Smoking status: Every Day     Packs/day: 2     Types: Cigarettes     Passive exposure: Current    Smokeless tobacco: Never   Vaping Use    Vaping Use: Never used   Substance Use Topics    Alcohol use: Not Currently    Drug use: Not Currently       Physical Exam   ED Triage Vitals [01/10/24 0315]   Temp Heart Rate Resp BP   36.3 °C (97.3 °F) 77 18 122/87      SpO2 Temp Source Heart Rate Source Patient Position   99 % Temporal Monitor Sitting      BP Location FiO2 (%)     Right arm --       Physical Exam  Constitutional:       General: He is not in acute distress.  Eyes:      Conjunctiva/sclera: Conjunctivae normal.   Cardiovascular:      Rate and Rhythm: Normal rate and regular rhythm.      Pulses: Normal pulses.      Heart sounds: Normal heart sounds.      Comments: Distal pulses intact and symmetric, cap refill less than 2 seconds  Pulmonary:      Effort: Pulmonary effort is normal.      Breath sounds: Normal breath sounds.   Musculoskeletal:      Cervical back: Normal range of motion.      Comments: FROM right ankle and digits of right foot without bony tenderness or bony deformity. NVI. No calf tenderness or swelling bilaterally.    Skin:     General: Skin is warm.      Capillary Refill: Capillary refill takes less than 2 seconds.      Comments: Mild blanching erythema to the dorsal  surface of the right foot and some mild swelling.  No crepitus or ecchymosis, no wounds or discharge or bleeding.  Scab over back of heel without surrounding erythema but there is mild edema without crepitus.  No discharge or bleeding.  No erythema to the dorsal surface of the foot, no edema erythema streaking into the ankle or calf.   Neurological:      Mental Status: He is alert and oriented to person, place, and time.         ED Course & MDM   Diagnoses as of 01/10/24 0440   Cellulitis of right foot       Medical Decision Making  58-year-old male with history of COPD, HIV and psychiatric illness presenting to the ED today with redness pain and swelling to his right foot that he noticed over the past few days.  There was no fall or injury and he does not use any IV drugs.  Patient states otherwise he is feeling well denies fevers chills body aches, nausea or vomiting.  He arrives afebrile with stable vital signs.  He is resting comfortably on exam without signs of acute distress.  He has good distal pulses, cap refill less than 2 seconds and is neurovascularly intact.  He is able to move his ankle and the digits of his right foot without any difficulty and there is no bony tenderness of bony deformity.  He does have some mild blanching erythema to the plantar surface of the foot but there are no wounds visualized and I did check between his toes as well.  There is some edema to the bottom of the foot as well as to the back of the ankle and there is a scab over the back of the ankle which patient tells me is a chronic wound, there is no erythema surrounding the scab and no discharge or bleeding.  There is no erythema streaking up the leg and no erythema on the dorsal surface of the right foot.  IV labs and x-ray are ordered.    X-ray of the right foot shows no acute abnormality.  CBC with stable H&H and no leukocytosis and lactate is 0.5.  CMP without acute electrolyte abnormality or renal insufficiency and his CRP  is elevated today at 6.5.  On reassessment patient is resting comfortably.  He tells me he takes Bactrim every other day chronically and he is on medication for his HIV as well that he is compliant with.  I discussed with the patient that due to his infection, has history of HIV I would like to admit him to the hospital for some IV antibiotics and monitoring however patient declined this.  He states that he would rather try going home on antibiotics first to see if it improves.  I discussed risks of not being admitted and closely monitored and he expressed understanding with these but still like to go home today.  He is given a dose of Keflex in the ER and will be discharged home on Keflex and I instructed him to take his Bactrim once a day for the next week instead of every other day.  I discussed he will need close follow-up with his primary care physician and I carefully outlined all warning signs to return to the ER and he expressed understanding.      Labs Reviewed   COMPREHENSIVE METABOLIC PANEL - Abnormal       Result Value    Glucose 106 (*)     Sodium 135 (*)     Potassium 3.7      Chloride 102      Bicarbonate 27      Anion Gap 10      Urea Nitrogen 18      Creatinine 1.30      eGFR 64      Calcium 9.8      Albumin 4.2      Alkaline Phosphatase 91      Total Protein 8.0      AST 15      Bilirubin, Total 0.5      ALT 16     C-REACTIVE PROTEIN - Abnormal    C-Reactive Protein 6.53 (*)    LACTATE - Normal    Lactate 0.5      Narrative:     Venipuncture immediately after or during the administration of Metamizole may lead to falsely low results. Testing should be performed immediately  prior to Metamizole dosing.   CBC WITH AUTO DIFFERENTIAL    WBC 6.0      nRBC 0.0      RBC 4.50      Hemoglobin 13.7      Hematocrit 41.6      MCV 92      MCH 30.4      MCHC 32.9      RDW 13.2      Platelets 201      Neutrophils % 66.5      Immature Granulocytes %, Automated 0.2      Lymphocytes % 21.9      Monocytes % 8.7       Eosinophils % 2.5      Basophils % 0.2      Neutrophils Absolute 3.98      Immature Granulocytes Absolute, Automated 0.01      Lymphocytes Absolute 1.31      Monocytes Absolute 0.52      Eosinophils Absolute 0.15      Basophils Absolute 0.01         XR foot right 3+ views   Final Result   No fracture or dislocation.   Signed by Christofer Hu MD            Procedure  Procedures     Alicia La PA-C  01/10/24 0442

## 2024-01-12 ENCOUNTER — HOSPITAL ENCOUNTER (EMERGENCY)
Facility: HOSPITAL | Age: 59
Discharge: HOME | End: 2024-01-13
Attending: STUDENT IN AN ORGANIZED HEALTH CARE EDUCATION/TRAINING PROGRAM
Payer: MEDICARE

## 2024-01-12 DIAGNOSIS — L03.119 CELLULITIS OF FOOT: Primary | ICD-10-CM

## 2024-01-12 LAB
ANION GAP SERPL CALC-SCNC: 10 MMOL/L (ref 10–20)
BASOPHILS # BLD AUTO: 0.03 X10*3/UL (ref 0–0.1)
BASOPHILS NFR BLD AUTO: 0.6 %
BUN SERPL-MCNC: 11 MG/DL (ref 6–23)
CALCIUM SERPL-MCNC: 9.1 MG/DL (ref 8.6–10.3)
CHLORIDE SERPL-SCNC: 103 MMOL/L (ref 98–107)
CO2 SERPL-SCNC: 30 MMOL/L (ref 21–32)
CREAT SERPL-MCNC: 0.9 MG/DL (ref 0.5–1.3)
CRP SERPL-MCNC: 2.07 MG/DL
EGFRCR SERPLBLD CKD-EPI 2021: >90 ML/MIN/1.73M*2
EOSINOPHIL # BLD AUTO: 0.17 X10*3/UL (ref 0–0.7)
EOSINOPHIL NFR BLD AUTO: 3.1 %
ERYTHROCYTE [DISTWIDTH] IN BLOOD BY AUTOMATED COUNT: 13.2 % (ref 11.5–14.5)
GLUCOSE SERPL-MCNC: 88 MG/DL (ref 74–99)
HCT VFR BLD AUTO: 43.1 % (ref 41–52)
HGB BLD-MCNC: 14.2 G/DL (ref 13.5–17.5)
IMM GRANULOCYTES # BLD AUTO: 0.02 X10*3/UL (ref 0–0.7)
IMM GRANULOCYTES NFR BLD AUTO: 0.4 % (ref 0–0.9)
LACTATE SERPL-SCNC: 1.4 MMOL/L (ref 0.4–2)
LYMPHOCYTES # BLD AUTO: 1.96 X10*3/UL (ref 1.2–4.8)
LYMPHOCYTES NFR BLD AUTO: 36.2 %
MCH RBC QN AUTO: 30.7 PG (ref 26–34)
MCHC RBC AUTO-ENTMCNC: 32.9 G/DL (ref 32–36)
MCV RBC AUTO: 93 FL (ref 80–100)
MONOCYTES # BLD AUTO: 0.52 X10*3/UL (ref 0.1–1)
MONOCYTES NFR BLD AUTO: 9.6 %
NEUTROPHILS # BLD AUTO: 2.72 X10*3/UL (ref 1.2–7.7)
NEUTROPHILS NFR BLD AUTO: 50.1 %
NRBC BLD-RTO: 0 /100 WBCS (ref 0–0)
PLATELET # BLD AUTO: 220 X10*3/UL (ref 150–450)
POTASSIUM SERPL-SCNC: 4 MMOL/L (ref 3.5–5.3)
RBC # BLD AUTO: 4.63 X10*6/UL (ref 4.5–5.9)
SODIUM SERPL-SCNC: 139 MMOL/L (ref 136–145)
WBC # BLD AUTO: 5.4 X10*3/UL (ref 4.4–11.3)

## 2024-01-12 PROCEDURE — 83605 ASSAY OF LACTIC ACID: CPT | Performed by: STUDENT IN AN ORGANIZED HEALTH CARE EDUCATION/TRAINING PROGRAM

## 2024-01-12 PROCEDURE — 2500000004 HC RX 250 GENERAL PHARMACY W/ HCPCS (ALT 636 FOR OP/ED): Performed by: PHARMACIST

## 2024-01-12 PROCEDURE — A4217 STERILE WATER/SALINE, 500 ML: HCPCS | Performed by: PHARMACIST

## 2024-01-12 PROCEDURE — 85025 COMPLETE CBC W/AUTO DIFF WBC: CPT | Performed by: STUDENT IN AN ORGANIZED HEALTH CARE EDUCATION/TRAINING PROGRAM

## 2024-01-12 PROCEDURE — 86140 C-REACTIVE PROTEIN: CPT | Performed by: STUDENT IN AN ORGANIZED HEALTH CARE EDUCATION/TRAINING PROGRAM

## 2024-01-12 PROCEDURE — 96374 THER/PROPH/DIAG INJ IV PUSH: CPT | Mod: 59

## 2024-01-12 PROCEDURE — 96365 THER/PROPH/DIAG IV INF INIT: CPT

## 2024-01-12 PROCEDURE — 2500000004 HC RX 250 GENERAL PHARMACY W/ HCPCS (ALT 636 FOR OP/ED): Performed by: STUDENT IN AN ORGANIZED HEALTH CARE EDUCATION/TRAINING PROGRAM

## 2024-01-12 PROCEDURE — 99284 EMERGENCY DEPT VISIT MOD MDM: CPT | Performed by: STUDENT IN AN ORGANIZED HEALTH CARE EDUCATION/TRAINING PROGRAM

## 2024-01-12 PROCEDURE — 80048 BASIC METABOLIC PNL TOTAL CA: CPT | Performed by: STUDENT IN AN ORGANIZED HEALTH CARE EDUCATION/TRAINING PROGRAM

## 2024-01-12 PROCEDURE — 36415 COLL VENOUS BLD VENIPUNCTURE: CPT | Performed by: STUDENT IN AN ORGANIZED HEALTH CARE EDUCATION/TRAINING PROGRAM

## 2024-01-12 RX ORDER — KETOROLAC TROMETHAMINE 30 MG/ML
30 INJECTION, SOLUTION INTRAMUSCULAR; INTRAVENOUS ONCE
Status: DISCONTINUED | OUTPATIENT
Start: 2024-01-12 | End: 2024-01-12

## 2024-01-12 RX ORDER — DOXYCYCLINE 100 MG/1
100 CAPSULE ORAL 2 TIMES DAILY
Qty: 14 CAPSULE | Refills: 0 | Status: SHIPPED | OUTPATIENT
Start: 2024-01-12 | End: 2024-01-19

## 2024-01-12 RX ORDER — LIDOCAINE 560 MG/1
2 PATCH PERCUTANEOUS; TOPICAL; TRANSDERMAL DAILY
Status: DISCONTINUED | OUTPATIENT
Start: 2024-01-12 | End: 2024-01-12

## 2024-01-12 RX ORDER — CEFTRIAXONE 1 G/50ML
1 INJECTION, SOLUTION INTRAVENOUS ONCE
Status: COMPLETED | OUTPATIENT
Start: 2024-01-12 | End: 2024-01-12

## 2024-01-12 RX ORDER — CYCLOBENZAPRINE HCL 10 MG
10 TABLET ORAL ONCE
Status: DISCONTINUED | OUTPATIENT
Start: 2024-01-12 | End: 2024-01-12

## 2024-01-12 RX ADMIN — VANCOMYCIN HYDROCHLORIDE 2000 MG: 10 INJECTION, POWDER, LYOPHILIZED, FOR SOLUTION INTRAVENOUS at 22:23

## 2024-01-12 RX ADMIN — CEFTRIAXONE SODIUM 1 G: 1 INJECTION, SOLUTION INTRAVENOUS at 22:44

## 2024-01-12 ASSESSMENT — COLUMBIA-SUICIDE SEVERITY RATING SCALE - C-SSRS
1. IN THE PAST MONTH, HAVE YOU WISHED YOU WERE DEAD OR WISHED YOU COULD GO TO SLEEP AND NOT WAKE UP?: NO
1. IN THE PAST MONTH, HAVE YOU WISHED YOU WERE DEAD OR WISHED YOU COULD GO TO SLEEP AND NOT WAKE UP?: NO
2. HAVE YOU ACTUALLY HAD ANY THOUGHTS OF KILLING YOURSELF?: NO
6. HAVE YOU EVER DONE ANYTHING, STARTED TO DO ANYTHING, OR PREPARED TO DO ANYTHING TO END YOUR LIFE?: NO
6. HAVE YOU EVER DONE ANYTHING, STARTED TO DO ANYTHING, OR PREPARED TO DO ANYTHING TO END YOUR LIFE?: NO
2. HAVE YOU ACTUALLY HAD ANY THOUGHTS OF KILLING YOURSELF?: NO

## 2024-01-12 ASSESSMENT — PAIN SCALES - GENERAL: PAINLEVEL_OUTOF10: 3

## 2024-01-12 ASSESSMENT — PAIN - FUNCTIONAL ASSESSMENT: PAIN_FUNCTIONAL_ASSESSMENT: 0-10

## 2024-01-12 ASSESSMENT — PAIN DESCRIPTION - LOCATION: LOCATION: FOOT

## 2024-01-13 VITALS
SYSTOLIC BLOOD PRESSURE: 117 MMHG | DIASTOLIC BLOOD PRESSURE: 69 MMHG | OXYGEN SATURATION: 98 % | HEART RATE: 90 BPM | RESPIRATION RATE: 18 BRPM | BODY MASS INDEX: 25.18 KG/M2 | TEMPERATURE: 97 F | WEIGHT: 170 LBS | HEIGHT: 69 IN

## 2024-01-13 NOTE — ED PROVIDER NOTES
"HPI   Chief Complaint   Patient presents with    Cellulitis     Was seen Tuesday, worsening       Pt seen in the ED on Tuesday for recurrent cellulitis of the right foot. Reports 8 years ago he had surgery on \"the bone of my heel. Then I just kept getting infections. It just keeps happening. They have me on bactrim every other day. A few days ago they started me on keflex. It's actually getting  better but not all the way gone\".     Pt denies fever/chills/sweats, n/v, chest pain, sob or abdominal pain.                           No data recorded                Patient History   Past Medical History:   Diagnosis Date    Disorder of the skin and subcutaneous tissue, unspecified 06/28/2019    Skin sore    Encounter for examination of eyes and vision without abnormal findings 07/22/2021    Eye exam, routine    Encounter for general adult medical examination without abnormal findings 07/27/2022    Encounter for Medicare annual wellness exam    Encounter for immunization     Encounter for immunization    Encounter for screening for malignant neoplasm of colon 06/02/2021    Encounter for colorectal cancer screening    Encounter for screening for malignant neoplasm of prostate 07/22/2021    Encounter for prostate cancer screening    Hiccough     Hiccups    Human immunodeficiency virus (HIV) disease (CMS/[facility])     HIV (human immunodeficiency virus infection)    Other spondylosis with radiculopathy, lumbar region     Osteoarthritis of spine with radiculopathy, lumbar region    Personal history of other diseases of the musculoskeletal system and connective tissue 06/28/2019    History of fibromyalgia    Personal history of other diseases of the respiratory system     History of asthma    Personal history of other endocrine, nutritional and metabolic disease     History of vitamin D deficiency    Personal history of other infectious and parasitic diseases 09/23/2020    History of candidiasis of mouth    Personal history of other " specified conditions 06/28/2019    History of chronic pain    Personal history of other specified conditions 02/10/2021    History of insomnia     Past Surgical History:   Procedure Laterality Date    OTHER SURGICAL HISTORY  11/10/2020    Foot surgery     No family history on file.  Social History     Tobacco Use    Smoking status: Every Day     Packs/day: 2     Types: Cigarettes     Passive exposure: Current    Smokeless tobacco: Never   Vaping Use    Vaping Use: Never used   Substance Use Topics    Alcohol use: Not Currently    Drug use: Not Currently       Physical Exam   ED Triage Vitals [01/12/24 2121]   Temp Heart Rate Resp BP   36.1 °C (97 °F) 86 20 137/83      SpO2 Temp Source Heart Rate Source Patient Position   96 % Temporal -- --      BP Location FiO2 (%)     Right arm --       Physical Exam  Constitutional:       Appearance: He is normal weight.   HENT:      Head: Normocephalic and atraumatic.      Right Ear: External ear normal.      Left Ear: External ear normal.   Abdominal:      Tenderness: There is no abdominal tenderness.   Musculoskeletal:      Comments: There is erythema with minimal blistering limited to the distal posterior aspect of the right lower extremity at the ankle. No sloughing of skin. Neurovascularly intact    Skin:     Capillary Refill: Capillary refill takes less than 2 seconds.   Neurological:      General: No focal deficit present.      Mental Status: He is alert. Mental status is at baseline.         ED Course & MDM        Medical Decision Making  There is erythema with minimal blistering limited to the distal posterior aspect of the right lower extremity at the ankle. No sloughing of skin. Neurovascularly intact     Pt started on keflex on Tuesday and is on maintenance bactrim every other day    He actually tells me it's getting better but not completely healed  Pt denies hx of MRSA  Will repeat his labs again today to compare crp and wbc  Had imaging of the ankle several days  ago that showed  FINDINGS:    The visualized portions of the ankle showing no acute process. The  calcaneus and talus showing no fracture or dislocation. The forefoot  of normal appearance. No metatarsal fracture or dislocation. No  discernible toe fracture or dislocation. No foreign body.  IMPRESSION:  No fracture or dislocation.    Don't see indication for repeat imaging as there's been no new injury and he tells me the infection is actually improving    Will give IV rocephin and vanco in the meantime     Patient deferred admission today and also several days ago for his infection.  I do not believe it is unreasonable to do given the fact that he has a normal white blood cell count of 5.4 and lactic acid of 1.4 and his CRP is actually downtrending significantly    I added on doxycycline for the patient to take at home in addition to the Keflex to cover him for MRSA and advised him to follow-up with his family doctor on Monday.  Patient agreeable and comfortable with the current plan of care.    Procedure  Procedures     Mitul Michelle MD  01/12/24 8777

## 2024-01-13 NOTE — CONSULTS
Aldair Walter, Age 58 is being initiated on pharmacy to dose vancomycin for Cellulitis, Skin and soft tissue in the ED.  Patient is only ordered 1 dose of vancomycin therapy, as ED orders are not valid to continue when pt is transferred to a floor. Renal function is currently stable.    Patient will be given an initial dose of 2000mg x1 dose.    Attending or ID Services must reorder if Vancomycin is to be continued.    Please contact the pharmacy at extension [phone] with any questions.    Thank you  Clarence Thomson [facility]

## 2024-01-18 ENCOUNTER — TELEPHONE (OUTPATIENT)
Dept: INFECTIOUS DISEASES | Age: 59
End: 2024-01-18

## 2024-01-18 DIAGNOSIS — Z00.00 ENCOUNTER FOR MEDICAL EXAMINATION TO ESTABLISH CARE: Primary | ICD-10-CM

## 2024-01-18 DIAGNOSIS — B20 CURRENTLY ASYMPTOMATIC HIV INFECTION, WITH HISTORY OF HIV-RELATED ILLNESS (HCC): Primary | ICD-10-CM

## 2024-01-18 NOTE — TELEPHONE ENCOUNTER
Pt called in wanting to talk  Currently at [facility]  Possibly moving but no definite plan. States he was made to take his Triumeq. Stated he did not want to. States he will stop meds when he moves.   Provided active listening as pt discussed recent ER visits and general health.   On doxy for cellulitis. States much better now. Will resume bactrim when done with doxy.   Reviewed schedule. Pt prefers to only see Dr Craven and in March not now.       Labs rodriguez 2.15.24-- discussed transport/policy     March 3.14.24 at 1245-- is not sure where he will be living. Stated he will call once moved and discuss further.       Rn and pt reviewed additional transport options and assistance. It is determined he will use [facility] as last report for help.   Discussed transport policy. Discussed need to call 2 hours prior to apt time for cancellation. Pt verbalized all understanding.   [facility] notified. Pt aware transport will be there for  about 30 mins prior to apt.     All appts are related to medical treatment and are necessary for compliance  This transportation assistance will help patient remain in medical care by enabling them to access medical and support services.    RN scheduled transportation per pt request,     location and  time: 10  Residency:   [address]  [address]  [facility] [id number] [phone] (home)       Appt Location and apt time:   2.15.24 at 1030 [facility] out pt lab.   [address], [place] [address]   Round trip    Denies any needs at this time. Denies any issues that need Doctor attention. Will call with concerns

## 2024-01-18 NOTE — TELEPHONE ENCOUNTER
Pt called back asking to help with new PCP.   Discussed multiple options.   Pt would like to have a [facility] PCP  Referral placed. Pt is asking for help scheduling apt.   Apt scheduled for 2.2.24 at 10    Policy reviewed     Rn and pt reviewed additional transport options and assistance. It is determined that pt will need transport assistance as last resort.     Pt states he is Aware of transport policy and  time. [facility] notified.   All appts are related to medical treatment and are necessary for compliance    This transportation assistance will help patient remain in medical care by enabling them to access medical and support services    RN scheduled transportation per pt request,     location and  time: 930  Residency:   [address] [place] [address]  [address]  [facility] [id number] [phone] (home)       Appt Location, date and apt time:   2.2.24 at 10 - [facility]  [address], [place] [address]       Round trip    Denies any needs at this time. Denies any issues that need Doctor attention. Will call with concern

## 2024-01-19 DIAGNOSIS — B20 HUMAN IMMUNODEFICIENCY VIRUS (HIV) DISEASE (MULTI): Primary | ICD-10-CM

## 2024-01-26 ENCOUNTER — TELEPHONE (OUTPATIENT)
Dept: INFECTIOUS DISEASES | Age: 59
End: 2024-01-26

## 2024-01-26 NOTE — TELEPHONE ENCOUNTER
Pt called into office in need of help with transport for up coming apt. .     Rn and pt reviewed additional transport options and assistance. It is determined that pt will need transport assistance as last resort.     Pt states he/she** is Aware of transport policy and  time. [facility] notified.   All appts are related to medical treatment and are necessary for compliance    This transportation assistance will help patient remain in medical care by enabling them to access medical and support services    RN scheduled transportation per pt request,     location and  time: 830  Residency:   [address] [facility]  [address]  [facility] [address] [phone] (home)       Appt Location, date and apt time:   2.1.24 at [address] [place] [address] ---- [address], [place] [address]       Round trip    Denies any needs at this time. Denies any issues that need Doctor attention. Will call with concern

## 2024-01-30 ENCOUNTER — TELEPHONE (OUTPATIENT)
Dept: INFECTIOUS DISEASES | Age: 59
End: 2024-01-30

## 2024-01-30 NOTE — TELEPHONE ENCOUNTER
Pt called in needing help with transport for upcoming appt.   Noted this to be an add on.   We reviewed each apt one by one.     2[facility]24 [facility]   2.2.24 P/U 930 -- PCP, then add on (as below)   2.15.24 P/U 10 -- taylor/labs       Rn and pt reviewed additional transport options and assistance. It is determined he will use [facility] as last report for help.   Discussed transport policy. Discussed need to call 2 hours prior to apt time for cancellation. Pt verbalized all understanding.   [facility] notified. Pt aware transport will be there for  about 30 mins prior to apt.     All appts are related to medical treatment and are necessary for compliance  This transportation assistance will help patient remain in medical care by enabling them to access medical and support services.    RN scheduled transportation per pt request,     location and  time: already scheduled 930  Residency:   [address] [place] [address]  [address]  [place] [address]  [phone] ([place])       Appt Location and apt time:   AFTER PCP APT-   2.2.24 -- (income)   [address]     Round trip --- from PCP [facility]-- [address] [place]     Denies any needs at this time. Denies any issues that need Doctor attention. Will call with concerns

## 2024-02-01 ENCOUNTER — TELEPHONE (OUTPATIENT)
Dept: INFECTIOUS DISEASES | Age: 59
End: 2024-02-01

## 2024-02-12 ENCOUNTER — TELEPHONE (OUTPATIENT)
Dept: INFECTIOUS DISEASES | Age: 59
End: 2024-02-12

## 2024-02-12 NOTE — TELEPHONE ENCOUNTER
Per request, CM scheduled transport to appointment with PCP 2/14/24 @ 1PM.  CM assessed need, arrangements as last resort.  Pt is aware of Transportation Policy.     2/14/24 @ 12:15P  Pt's residence  [address] [place] [address], #  [place] [address]    Destination  [facility]  [address] [place] [address] Dave.  [place] [phone]

## 2024-02-14 ENCOUNTER — TELEPHONE (OUTPATIENT)
Dept: INFECTIOUS DISEASES | Age: 59
End: 2024-02-14

## 2024-02-14 ENCOUNTER — OFFICE VISIT (OUTPATIENT)
Dept: FAMILY MEDICINE CLINIC | Age: 59
End: 2024-02-14
Payer: MEDICARE

## 2024-02-14 VITALS
TEMPERATURE: 97.4 F | OXYGEN SATURATION: 98 % | WEIGHT: 175.8 LBS | DIASTOLIC BLOOD PRESSURE: 81 MMHG | HEIGHT: 69 IN | HEART RATE: 72 BPM | BODY MASS INDEX: 26.04 KG/M2 | SYSTOLIC BLOOD PRESSURE: 109 MMHG

## 2024-02-14 DIAGNOSIS — F33.3 MDD (MAJOR DEPRESSIVE DISORDER), RECURRENT, SEVERE, WITH PSYCHOSIS (HCC): Chronic | ICD-10-CM

## 2024-02-14 DIAGNOSIS — E89.0 HX OF THYROIDECTOMY: ICD-10-CM

## 2024-02-14 DIAGNOSIS — M25.521 RIGHT ELBOW PAIN: ICD-10-CM

## 2024-02-14 DIAGNOSIS — M25.512 ACUTE PAIN OF LEFT SHOULDER: Primary | ICD-10-CM

## 2024-02-14 DIAGNOSIS — E78.5 DYSLIPIDEMIA: ICD-10-CM

## 2024-02-14 DIAGNOSIS — L98.9 SKIN LESION: ICD-10-CM

## 2024-02-14 DIAGNOSIS — F41.1 GAD (GENERALIZED ANXIETY DISORDER): ICD-10-CM

## 2024-02-14 DIAGNOSIS — Z87.891 PERSONAL HISTORY OF TOBACCO USE: ICD-10-CM

## 2024-02-14 DIAGNOSIS — Z12.5 PROSTATE CANCER SCREENING: ICD-10-CM

## 2024-02-14 DIAGNOSIS — F14.90 CRACK COCAINE USE: ICD-10-CM

## 2024-02-14 PROCEDURE — G0296 VISIT TO DETERM LDCT ELIG: HCPCS | Performed by: NURSE PRACTITIONER

## 2024-02-14 PROCEDURE — 99214 OFFICE O/P EST MOD 30 MIN: CPT | Performed by: NURSE PRACTITIONER

## 2024-02-14 SDOH — ECONOMIC STABILITY: FOOD INSECURITY: WITHIN THE PAST 12 MONTHS, YOU WORRIED THAT YOUR FOOD WOULD RUN OUT BEFORE YOU GOT MONEY TO BUY MORE.: NEVER TRUE

## 2024-02-14 SDOH — ECONOMIC STABILITY: FOOD INSECURITY: WITHIN THE PAST 12 MONTHS, THE FOOD YOU BOUGHT JUST DIDN'T LAST AND YOU DIDN'T HAVE MONEY TO GET MORE.: NEVER TRUE

## 2024-02-14 SDOH — ECONOMIC STABILITY: INCOME INSECURITY: HOW HARD IS IT FOR YOU TO PAY FOR THE VERY BASICS LIKE FOOD, HOUSING, MEDICAL CARE, AND HEATING?: NOT HARD AT ALL

## 2024-02-14 SDOH — ECONOMIC STABILITY: HOUSING INSECURITY
IN THE LAST 12 MONTHS, WAS THERE A TIME WHEN YOU DID NOT HAVE A STEADY PLACE TO SLEEP OR SLEPT IN A SHELTER (INCLUDING NOW)?: NO

## 2024-02-14 ASSESSMENT — PATIENT HEALTH QUESTIONNAIRE - PHQ9
3. TROUBLE FALLING OR STAYING ASLEEP: 0
6. FEELING BAD ABOUT YOURSELF - OR THAT YOU ARE A FAILURE OR HAVE LET YOURSELF OR YOUR FAMILY DOWN: 0
9. THOUGHTS THAT YOU WOULD BE BETTER OFF DEAD, OR OF HURTING YOURSELF: 0
SUM OF ALL RESPONSES TO PHQ QUESTIONS 1-9: 4
1. LITTLE INTEREST OR PLEASURE IN DOING THINGS: 1
8. MOVING OR SPEAKING SO SLOWLY THAT OTHER PEOPLE COULD HAVE NOTICED. OR THE OPPOSITE, BEING SO FIGETY OR RESTLESS THAT YOU HAVE BEEN MOVING AROUND A LOT MORE THAN USUAL: 0
SUM OF ALL RESPONSES TO PHQ9 QUESTIONS 1 & 2: 1
SUM OF ALL RESPONSES TO PHQ QUESTIONS 1-9: 4
SUM OF ALL RESPONSES TO PHQ QUESTIONS 1-9: 4
5. POOR APPETITE OR OVEREATING: 2
4. FEELING TIRED OR HAVING LITTLE ENERGY: 1
10. IF YOU CHECKED OFF ANY PROBLEMS, HOW DIFFICULT HAVE THESE PROBLEMS MADE IT FOR YOU TO DO YOUR WORK, TAKE CARE OF THINGS AT HOME, OR GET ALONG WITH OTHER PEOPLE: 0
2. FEELING DOWN, DEPRESSED OR HOPELESS: 0
7. TROUBLE CONCENTRATING ON THINGS, SUCH AS READING THE NEWSPAPER OR WATCHING TELEVISION: 0
SUM OF ALL RESPONSES TO PHQ QUESTIONS 1-9: 4

## 2024-02-14 NOTE — TELEPHONE ENCOUNTER
Pt called in stating he/ needed to cancel transport for 2.15.2024. he has his own transport at this time.   Call placed to [facility]. Transport canceled with in appropriate time frame.       Pt did not another apt where is is unable to secure transport.   2.22.24 neuro    Rn and pt reviewed additional transport options and assistance. It is determined he will use [facility] as last report for help.   Discussed transport policy. Discussed need to call 2 hours prior to apt time for cancellation. Pt verbalized all understanding.   [facility] notified. Pt aware transport will be there for  about 30 mins prior to apt.     All appts are related to medical treatment and are necessary for compliance  This transportation assistance will help patient remain in medical care by enabling them to access medical and support services.    RN scheduled transportation per pt request,     location and  time: 1215  Residency:   [address] [place] [address].  [address]  [facility] [address]  [phone] (home)       Appt Location and apt time:   2.22.24 at 1245 - Dr Morfin   [address] [place] [address]   Round trip    Denies any needs at this time. Denies any issues that need Doctor attention. Will call with concerns

## 2024-02-14 NOTE — PROGRESS NOTES
Subjective:      Patient ID: Adelso Cruz is a 58 y.o. male who presents today for:     Chief Complaint   Patient presents with    Shoulder Pain     Patient presents today c/o Lt shoulder pain x 2 months.    Arm Pain     Patient c/o Rt arm pain x 2 months.    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 He reports that he thinks he hurt his shoulder pulling the wagon. Has been hurting for a couple of months. Also has broken right elbow twice as a child and recently it has been hurting medially recently. He reports his elbow also started about 2 months ago.    He currently has a hover round which he uses to get around places and currently needs new tires and a battery.     Reports hx of partial thyroidectomy but thyroid compensates.    Pt reports long hx of drug addiction. He reports that he is interested in therapy to help. He reports that he would like to do in house. He has tried [facility] and it did not work well for him. He reports that he     Past Medical History:   Diagnosis Date    Anxiety     Cancer (HCC)     skin  L arm    Cellulitis of heel, right 2016    after surgery x 3 on tendion     CHF (congestive heart failure) (HCC)     COPD (chronic obstructive pulmonary disease) (HCC)     Depression     Erectile dysfunction     Headache(784.0)     HIV (human immunodeficiency virus infection) (HCC)     Hyperlipidemia     Liver disease     Osteoarthritis      Past Surgical History:   Procedure Laterality Date    CHOLECYSTECTOMY      CYST REMOVAL      R wrist x2    FOOT SURGERY      bone extraction    FRACTURE SURGERY      UPPER GASTROINTESTINAL ENDOSCOPY N/A 1/16/2019    EGD ESOPHAGOGASTRODUODENOSCOPY performed by Karl Will MD at [facility]     Family History   Problem Relation Age of Onset    High Blood Pressure Mother     Cancer Father      Social History     Socioeconomic History    Marital status: Single     Spouse name: Not on file

## 2024-02-14 NOTE — PATIENT INSTRUCTIONS
follow-up, he or she will help you understand what to do next.  After a lung cancer screening, you can go back to your usual activities right away.  A lung cancer screening test can't tell if you have lung cancer. If your results are positive, your doctor can't tell whether an abnormal finding is a harmless nodule, cancer, or something else without doing more tests.  What can you do to help prevent lung cancer?  Some lung cancers can't be prevented. But if you smoke, quitting smoking is the best step you can take to prevent lung cancer. If you want to quit, your doctor can recommend medicines or other ways to help.  Follow-up care is a key part of your treatment and safety. Be sure to make and go to all appointments, and call your doctor if you are having problems. It's also a good idea to know your test results and keep a list of the medicines you take.  Where can you learn more?  Go to https://www.[employer].net/patientEd and enter Q940 to learn more about \"Learning About Lung Cancer Screening.\"  Current as of: February 28, 2023               Content Version: 13.9  © 7595-9161 [employer].   Care instructions adapted under license by [employer]. If you have questions about a medical condition or this instruction, always ask your healthcare professional. [employer] disclaims any warranty or liability for your use of this information.

## 2024-02-14 NOTE — TELEPHONE ENCOUNTER
Pt called, per request, Cm faxed copies of insurance cards.  Pt has an appointment with PCP today, cannot find his wallet.  CM will follow up with pt as needed.

## 2024-02-15 ENCOUNTER — LAB (OUTPATIENT)
Dept: LAB | Facility: LAB | Age: 59
End: 2024-02-15
Payer: MEDICARE

## 2024-02-15 ENCOUNTER — TELEPHONE (OUTPATIENT)
Dept: INFECTIOUS DISEASES | Age: 59
End: 2024-02-15

## 2024-02-15 DIAGNOSIS — E89.0 POSTPROCEDURAL HYPOTHYROIDISM: ICD-10-CM

## 2024-02-15 DIAGNOSIS — Z12.5 ENCOUNTER FOR SCREENING FOR MALIGNANT NEOPLASM OF PROSTATE: Primary | ICD-10-CM

## 2024-02-15 DIAGNOSIS — B20 HUMAN IMMUNODEFICIENCY VIRUS (HIV) DISEASE (MULTI): ICD-10-CM

## 2024-02-15 DIAGNOSIS — E78.5 HYPERLIPIDEMIA, UNSPECIFIED: ICD-10-CM

## 2024-02-15 LAB
ALBUMIN SERPL BCP-MCNC: 3.9 G/DL (ref 3.4–5)
ALP SERPL-CCNC: 98 U/L (ref 33–120)
ALT SERPL W P-5'-P-CCNC: 15 U/L (ref 10–52)
ANION GAP SERPL CALC-SCNC: 12 MMOL/L (ref 10–20)
AST SERPL W P-5'-P-CCNC: 16 U/L (ref 9–39)
BASOPHILS # BLD AUTO: 0.04 X10*3/UL (ref 0–0.1)
BASOPHILS NFR BLD AUTO: 0.7 %
BILIRUB SERPL-MCNC: 0.3 MG/DL (ref 0–1.2)
BUN SERPL-MCNC: 18 MG/DL (ref 6–23)
CALCIUM SERPL-MCNC: 9.3 MG/DL (ref 8.6–10.3)
CD3+CD4+ CELLS # BLD: 0.35 X10E9/L
CD3+CD4+ CELLS # BLD: 354 /MM3
CD3+CD4+ CELLS NFR BLD: 20 %
CD3+CD4+ CELLS/CD3+CD8+ CLL BLD: 0.41 %
CD3+CD8+ CELLS # BLD: 0.87 X10E9/L
CD3+CD8+ CELLS NFR BLD: 49 %
CHLORIDE SERPL-SCNC: 103 MMOL/L (ref 98–107)
CHOLEST SERPL-MCNC: 173 MG/DL (ref 0–199)
CHOLESTEROL/HDL RATIO: 4.1
CO2 SERPL-SCNC: 30 MMOL/L (ref 21–32)
CREAT SERPL-MCNC: 1.1 MG/DL (ref 0.5–1.3)
EGFRCR SERPLBLD CKD-EPI 2021: 79 ML/MIN/1.73M*2
EOSINOPHIL # BLD AUTO: 0.24 X10*3/UL (ref 0–0.7)
EOSINOPHIL NFR BLD AUTO: 3.9 %
ERYTHROCYTE [DISTWIDTH] IN BLOOD BY AUTOMATED COUNT: 14.5 % (ref 11.5–14.5)
ERYTHROCYTE [DISTWIDTH] IN BLOOD BY AUTOMATED COUNT: 14.5 % (ref 11.5–14.5)
GLUCOSE SERPL-MCNC: 122 MG/DL (ref 74–99)
HCT VFR BLD AUTO: 49.2 % (ref 41–52)
HCT VFR BLD AUTO: 49.2 % (ref 41–52)
HDLC SERPL-MCNC: 42.3 MG/DL
HGB BLD-MCNC: 16.1 G/DL (ref 13.5–17.5)
HGB BLD-MCNC: 16.1 G/DL (ref 13.5–17.5)
IMM GRANULOCYTES # BLD AUTO: 0.02 X10*3/UL (ref 0–0.7)
IMM GRANULOCYTES NFR BLD AUTO: 0.3 % (ref 0–0.9)
LDLC SERPL CALC-MCNC: 114 MG/DL
LYMPHOCYTES # BLD AUTO: 1.77 X10*3/UL (ref 1.2–4.8)
LYMPHOCYTES # SPEC AUTO: 1.77 X10*3/UL
LYMPHOCYTES NFR BLD AUTO: 29 %
MCH RBC QN AUTO: 30.6 PG (ref 26–34)
MCH RBC QN AUTO: 30.6 PG (ref 26–34)
MCHC RBC AUTO-ENTMCNC: 32.7 G/DL (ref 32–36)
MCHC RBC AUTO-ENTMCNC: 32.7 G/DL (ref 32–36)
MCV RBC AUTO: 94 FL (ref 80–100)
MCV RBC AUTO: 94 FL (ref 80–100)
MONOCYTES # BLD AUTO: 0.39 X10*3/UL (ref 0.1–1)
MONOCYTES NFR BLD AUTO: 6.4 %
NEUTROPHILS # BLD AUTO: 3.65 X10*3/UL (ref 1.2–7.7)
NEUTROPHILS NFR BLD AUTO: 59.7 %
NON HDL CHOLESTEROL: 131 MG/DL (ref 0–149)
NRBC BLD-RTO: 0 /100 WBCS (ref 0–0)
NRBC BLD-RTO: 0 /100 WBCS (ref 0–0)
PLATELET # BLD AUTO: 197 X10*3/UL (ref 150–450)
PLATELET # BLD AUTO: 197 X10*3/UL (ref 150–450)
POTASSIUM SERPL-SCNC: 4.8 MMOL/L (ref 3.5–5.3)
PROT SERPL-MCNC: 7.1 G/DL (ref 6.4–8.2)
PSA SERPL-MCNC: 0.43 NG/ML
RBC # BLD AUTO: 5.26 X10*6/UL (ref 4.5–5.9)
RBC # BLD AUTO: 5.26 X10*6/UL (ref 4.5–5.9)
SODIUM SERPL-SCNC: 140 MMOL/L (ref 136–145)
TREPONEMA PALLIDUM IGG+IGM AB [PRESENCE] IN SERUM OR PLASMA BY IMMUNOASSAY: NONREACTIVE
TRIGL SERPL-MCNC: 86 MG/DL (ref 0–149)
TSH SERPL-ACNC: 1.54 MIU/L (ref 0.44–3.98)
VLDL: 17 MG/DL (ref 0–40)
WBC # BLD AUTO: 6.1 X10*3/UL (ref 4.4–11.3)
WBC # BLD AUTO: 6.1 X10*3/UL (ref 4.4–11.3)

## 2024-02-15 PROCEDURE — 87536 HIV-1 QUANT&REVRSE TRNSCRPJ: CPT

## 2024-02-15 PROCEDURE — 80053 COMPREHEN METABOLIC PANEL: CPT

## 2024-02-15 PROCEDURE — 80061 LIPID PANEL: CPT

## 2024-02-15 PROCEDURE — 88185 FLOWCYTOMETRY/TC ADD-ON: CPT

## 2024-02-15 PROCEDURE — 88184 FLOWCYTOMETRY/ TC 1 MARKER: CPT

## 2024-02-15 PROCEDURE — 85025 COMPLETE CBC W/AUTO DIFF WBC: CPT

## 2024-02-15 PROCEDURE — G0103 PSA SCREENING: HCPCS

## 2024-02-15 PROCEDURE — 36415 COLL VENOUS BLD VENIPUNCTURE: CPT

## 2024-02-15 PROCEDURE — 86780 TREPONEMA PALLIDUM: CPT

## 2024-02-15 PROCEDURE — 84443 ASSAY THYROID STIM HORMONE: CPT

## 2024-02-15 NOTE — TELEPHONE ENCOUNTER
Pt called into office in need of help with transport for up coming apt. .     Rn and pt reviewed additional transport options and assistance. It is determined that pt will need transport assistance as last resort.     Pt states he is Aware of transport policy and  time. Flora notified.   All appts are related to medical treatment and are necessary for compliance    This transportation assistance will help patient remain in medical care by enabling them to access medical and support services    RN scheduled transportation per pt request,     location and  time: 1145  Residency:   [facility] [address] [facility]  [address] [place], [place] [id number]  [phone] (home)       Appt Location, date and apt time:   2.26.24 at [facility]  [address]    Round trip    Denies any needs at this time. Denies any issues that need Doctor attention. Will call with concern

## 2024-02-16 ENCOUNTER — TELEPHONE (OUTPATIENT)
Dept: FAMILY MEDICINE CLINIC | Age: 59
End: 2024-02-16

## 2024-02-16 ENCOUNTER — TELEPHONE (OUTPATIENT)
Dept: INFECTIOUS DISEASES | Age: 59
End: 2024-02-16

## 2024-02-16 LAB
HIV1 RNA # PLAS NAA DL=20: NOT DETECTED {COPIES}/ML
HIV1 RNA SPEC NAA+PROBE-LOG#: NORMAL {LOG_COPIES}/ML

## 2024-02-16 NOTE — TELEPHONE ENCOUNTER
[facility] calling in regarding referral to psychology for patient.  They do not schedule patients for psychology.    LOV 2/14/24    Brandi from [facility] - [phone]

## 2024-02-16 NOTE — TELEPHONE ENCOUNTER
Pt called into office in need of help with transport for up coming apt. .     Rn and pt reviewed additional transport options and assistance. It is determined that pt will need transport assistance as last resort.     Pt states he is Aware of transport policy and  time. Flora notified.   All appts are related to medical treatment and are necessary for compliance    This transportation assistance will help patient remain in medical care by enabling them to access medical and support services    RN scheduled transportation per pt request,     location and  time: 1245  Residency:   [address] [place] [address]  [address]  [facility] [address]  [phone] (home)       Appt Location, date and apt time:   4.8.24 at 115 Dr Parikh (ENT)   [address]       Round trip      Saw PCP.   Right arm lump- CT per pcp. Had IV when in hosp/ and thinks vein blew but \"CT just in case\"   Discussed compliance -- states  \"trying, on and off, forgets a lot. History of non compliance.   Reviewed all scheduled apts.   MH- states is better. Working on getting better family relationships  Labs completed yesterday and  claudia     Denies any needs at this time. Denies any issues that need Doctor attention. Will call with concern

## 2024-02-20 ASSESSMENT — ENCOUNTER SYMPTOMS
WHEEZING: 0
ABDOMINAL PAIN: 0
SHORTNESS OF BREATH: 0
DIARRHEA: 0
COUGH: 0
CONSTIPATION: 0

## 2024-02-26 ENCOUNTER — HOSPITAL ENCOUNTER (OUTPATIENT)
Dept: CT IMAGING | Age: 59
Discharge: HOME | End: 2024-02-28
Payer: MEDICARE

## 2024-02-26 DIAGNOSIS — Z87.891 PERSONAL HISTORY OF TOBACCO USE: ICD-10-CM

## 2024-02-26 PROCEDURE — 71271 CT THORAX LUNG CANCER SCR C-: CPT

## 2024-02-27 ENCOUNTER — TELEPHONE (OUTPATIENT)
Dept: INFECTIOUS DISEASES | Age: 59
End: 2024-02-27

## 2024-02-27 NOTE — TELEPHONE ENCOUNTER
Pt called, requested appointment for [facility].  CM scheduled appointment on 3/13/24 @ 10AM.  CM will also complete Annual Review at that time.  Per request, CM will schedule transport for appointment, afterwards, pt will be transported to [facility] for [facility].  CM assessed need, arrangements as last resort.  Pt is aware of Transportation Policy.     3/13/24 @ 9:30AM  Pt's residence  [address] [facility]  [place] [address]    Destination  [facility]  [address]  [place] [address]    [facility]  [address] [place] [address]

## 2024-03-04 ENCOUNTER — TELEPHONE (OUTPATIENT)
Dept: INFECTIOUS DISEASES | Age: 59
End: 2024-03-04

## 2024-03-04 NOTE — TELEPHONE ENCOUNTER
Pt called in needing help with transport for upcoming appt.       Rn and pt reviewed additional transport options and assistance. It is determined he will use [facility] as last report for help.   Discussed transport policy. Discussed need to call 2 hours prior to apt time for cancellation. Pt verbalized all understanding.   [facility] notified. Pt aware transport will be there for  about 30 mins prior to apt.     All appts are related to medical treatment and are necessary for compliance  This transportation assistance will help patient remain in medical care by enabling them to access medical and support services.    RN scheduled transportation per pt request,     location and  time: 12  Residency:   [address] [facility]  [address]  [facility] [address]  [phone] (home)       Appt Location and apt time:   5.1.24 at 1230 Dermatology   [address]     Round trip    Denies any needs at this time. Denies any issues that need Doctor attention. Will call with concerns

## 2024-03-08 ENCOUNTER — TELEPHONE (OUTPATIENT)
Dept: INFECTIOUS DISEASES | Age: 59
End: 2024-03-08

## 2024-03-08 DIAGNOSIS — E04.1 THYROID NODULE: ICD-10-CM

## 2024-03-08 NOTE — TELEPHONE ENCOUNTER
Pt called in stating he/she needed to cancel transport 4.8.24  Call placed to [facility]. Transport canceled with in appropriate time frame.     ENT apt R/S      Rn and pt reviewed additional transport options and assistance. It is determined that pt will need transport assistance as last resort.     Pt states he is Aware of transport policy and  time. [facility] notified.   All appts are related to medical treatment and are necessary for compliance    This transportation assistance will help patient remain in medical care by enabling them to access medical and support services    RN scheduled transportation per pt request,     location and  time: 1215  Residency:   [address] [place] [address]  [address]  [place] [id number]  [phone] (home)       Appt Location, date and apt time:   4.22.24 at 1245 -- ENT  [address] [facility]       Round trip    Denies any needs at this time. Denies any issues that need Doctor attention. Will call with concern

## 2024-03-15 ENCOUNTER — TELEPHONE (OUTPATIENT)
Dept: INFECTIOUS DISEASES | Age: 59
End: 2024-03-15

## 2024-03-15 NOTE — TELEPHONE ENCOUNTER
Call placed to pt to reschedule   Cx last apt.   Canceled transport 3.22.24   Transport canceled late as  TIME: 830. This is considered a now show. Transportation Policy review and pt verbalized understanding of policy and sanctions if continued no shows.         Rn and pt reviewed additional transport options and assistance. It is determined he will use [facility] as last report for help.   Discussed transport policy. Discussed need to call 2 hours prior to apt time for cancellation. Pt verbalized all understanding.   [facility] notified. Pt aware transport will be there for  about 30 mins prior to apt.     All appts are related to medical treatment and are necessary for compliance  This transportation assistance will help patient remain in medical care by enabling them to access medical and support services.    RN scheduled transportation per pt request,     location and  time: 1015  Residency:   [facility] [address] [facility]  [address]  [facility] [address]  [phone] (home)       Appt Location and apt time:   4.2.24 at 1045 - Dr Post  [address]   Round trip    Denies any needs at this time. Denies any issues that need Doctor attention. Will call with concerns

## 2024-04-01 ENCOUNTER — TELEPHONE (OUTPATIENT)
Dept: INFECTIOUS DISEASES | Age: 59
End: 2024-04-01

## 2024-04-01 NOTE — TELEPHONE ENCOUNTER
CM called pt to remind him of transport scheduled 4/2/24 for appointment with FIORELLA and Dr. Post.  Per request, CM added transport to [facility] after appointment.    Destination  [facility]   [address] [place], [place] [address]

## 2024-04-02 ENCOUNTER — NURSE ONLY (OUTPATIENT)
Dept: INFECTIOUS DISEASES | Age: 59
End: 2024-04-02

## 2024-04-02 ENCOUNTER — OFFICE VISIT (OUTPATIENT)
Dept: INFECTIOUS DISEASES | Age: 59
End: 2024-04-02
Payer: MEDICARE

## 2024-04-02 ENCOUNTER — TELEPHONE (OUTPATIENT)
Dept: INFECTIOUS DISEASES | Age: 59
End: 2024-04-02

## 2024-04-02 VITALS
HEART RATE: 100 BPM | TEMPERATURE: 98.1 F | SYSTOLIC BLOOD PRESSURE: 122 MMHG | WEIGHT: 170 LBS | DIASTOLIC BLOOD PRESSURE: 82 MMHG | BODY MASS INDEX: 25.1 KG/M2

## 2024-04-02 DIAGNOSIS — F17.200 NICOTINE DEPENDENCE, UNCOMPLICATED, UNSPECIFIED NICOTINE PRODUCT TYPE: ICD-10-CM

## 2024-04-02 DIAGNOSIS — R13.12 OROPHARYNGEAL DYSPHAGIA: ICD-10-CM

## 2024-04-02 DIAGNOSIS — B20 HIV INFECTION, UNSPECIFIED SYMPTOM STATUS (HCC): Primary | ICD-10-CM

## 2024-04-02 DIAGNOSIS — F33.3 MDD (MAJOR DEPRESSIVE DISORDER), RECURRENT, SEVERE, WITH PSYCHOSIS (HCC): Chronic | ICD-10-CM

## 2024-04-02 DIAGNOSIS — F19.10 POLYSUBSTANCE ABUSE (HCC): ICD-10-CM

## 2024-04-02 DIAGNOSIS — Z91.148 NONCOMPLIANCE WITH MEDICATION REGIMEN: ICD-10-CM

## 2024-04-02 PROCEDURE — 99215 OFFICE O/P EST HI 40 MIN: CPT | Performed by: INTERNAL MEDICINE

## 2024-04-02 ASSESSMENT — PATIENT HEALTH QUESTIONNAIRE - PHQ9
2. FEELING DOWN, DEPRESSED OR HOPELESS: NEARLY EVERY DAY
SUM OF ALL RESPONSES TO PHQ QUESTIONS 1-9: 8
6. FEELING BAD ABOUT YOURSELF - OR THAT YOU ARE A FAILURE OR HAVE LET YOURSELF OR YOUR FAMILY DOWN: NOT AT ALL
1. LITTLE INTEREST OR PLEASURE IN DOING THINGS: SEVERAL DAYS
SUM OF ALL RESPONSES TO PHQ9 QUESTIONS 1 & 2: 4
5. POOR APPETITE OR OVEREATING: NOT AT ALL
SUM OF ALL RESPONSES TO PHQ QUESTIONS 1-9: 8
9. THOUGHTS THAT YOU WOULD BE BETTER OFF DEAD, OR OF HURTING YOURSELF: NOT AT ALL
SUM OF ALL RESPONSES TO PHQ QUESTIONS 1-9: 8
3. TROUBLE FALLING OR STAYING ASLEEP: NEARLY EVERY DAY
8. MOVING OR SPEAKING SO SLOWLY THAT OTHER PEOPLE COULD HAVE NOTICED. OR THE OPPOSITE, BEING SO FIGETY OR RESTLESS THAT YOU HAVE BEEN MOVING AROUND A LOT MORE THAN USUAL: NOT AT ALL
SUM OF ALL RESPONSES TO PHQ QUESTIONS 1-9: 8
10. IF YOU CHECKED OFF ANY PROBLEMS, HOW DIFFICULT HAVE THESE PROBLEMS MADE IT FOR YOU TO DO YOUR WORK, TAKE CARE OF THINGS AT HOME, OR GET ALONG WITH OTHER PEOPLE: SOMEWHAT DIFFICULT
7. TROUBLE CONCENTRATING ON THINGS, SUCH AS READING THE NEWSPAPER OR WATCHING TELEVISION: SEVERAL DAYS
4. FEELING TIRED OR HAVING LITTLE ENERGY: NOT AT ALL

## 2024-04-02 ASSESSMENT — ENCOUNTER SYMPTOMS
CONSTIPATION: 1
CHOKING: 1

## 2024-04-02 NOTE — PROGRESS NOTES
Adelso Cruz (:  1965) is a 59 y.o. male,Established patient, here for evaluation of the following chief complaint(s):  No chief complaint on file.         ASSESSMENT/PLAN:  HIV, suppressed virus despite noncompliance with medications  Dysphagia to solids  Non compliance with meds  Drug abuse  Nicotine dependence  Skin cancer, recurrent  MDD, recurrent and severe      Reinforce 100% compliance with HIV.  Resume Triumeq  Follow-up HIV viral load and CD4  Follow-up CBC complete metabolic profile  F/U with Psych  Colonoscopy and cologard, refused   F/U with derm for skin cancer  F/U with ENT for dysphagia  F/U with PCP for L shoulder pain  Advise smoking cessation and rehab    Subjective   SUBJECTIVE/OBJECTIVE:  HPI  Follow-up HIV, supposed to be on Triumeq, currently not taking it.   Last viral load was undetectable.   Currently is not taking any medications.  Stopped psych medications too.   Has been at [facility] seeing mental health.  Frequent mental health facility for H/O suicide, hallucination  Right foot chronic nonhealing ulcer  Noncompliant with medications  History of crack cocaine and meth abuse  No alcohol abuse  Smokes cigarettes 1 to 2 packs daily  Refuses psych meds  +ve auditory hallucinations    Review of Systems   Respiratory:  Positive for choking.    Gastrointestinal:  Positive for constipation.   Musculoskeletal:  Positive for arthralgias (L shoulder pain and limited ROM).   Skin:  Positive for rash (skin lesions cailin RUE and behind L ear).   Psychiatric/Behavioral:  Positive for dysphoric mood (severe). Negative for agitation, confusion, hallucinations and suicidal ideas. The patient is not nervous/anxious and is not hyperactive.    All other systems reviewed and are negative.         Objective   Physical Exam     Vitals:    24 1102   BP: 122/82   Site: Right Upper Arm   Position: Sitting   Cuff Size: Medium Adult   Pulse: 100   Temp: 98.1 °F (36.7 °C)   Weight: 77.1

## 2024-04-02 NOTE — PROGRESS NOTES
No                  If YES: 65a. Please explain. NA        66. Have you been affected by domestic violence? No                    If YES: 66a. Please explain. NA      67. Is anyone hurting and/or threatening you, make you feel afraid, or forcing you to do something against your will? No               If YES: 67a. Who and how? NA      68. Are you being forced by another person to engage in sexual acts to receive needs (e.g., food, clothing, shelter, drugs, money, protection, etc.)? No      69. Are your IDs or passports unwillingly being held by another person? No                If YES: 69a. Please explain. NA    70. Have you ever been involved with Child Protective Services? No    71. Have you ever been involved with Adult Protective Services? No    72. Are there any firearms in your home? No      Annual Notes:     Semi- Annual Notes:    Annual Referrals Needed/Made:    Semi-Annual Referrals Needed/Made:                       Self-Management                                          Basic Need                                               Moderate Need                                             Intensive Need  Annual Review Score=0 Annual Review Score=3 Annual Review Score=4 Annual Review Score=5      [x]    [] No history or current instances of abuse or domestic violence    []    [] History, past relationships with violence    []     [] Agency(ies) involved due to signs of potential abuse(emotional, sexual, physical)    []    [] Medical, legal or outside intervention has occurred      [x]    [] Client feels safe       []     [] Reports current violent episodes    []    [] Life-threatening violence and/or abuse chronically and presently occurring            []     [] Unsafe history and pattern in current relationship    []    [] Volatile home environment            []     [] Involvement with Child Protective Services   *Refer to domestic violence resources         14. SUPPORT SYSTEM

## 2024-04-02 NOTE — TELEPHONE ENCOUNTER
In office.   PCP apt upcoming     Rn and pt reviewed additional transport options and assistance. It is determined he will use [facility] as last report for help.   Discussed transport policy. Discussed need to call 2 hours prior to apt time for cancellation. Pt verbalized all understanding.   [facility] notified. Pt aware transport will be there for  about 30 mins prior to apt.     All appts are related to medical treatment and are necessary for compliance  This transportation assistance will help patient remain in medical care by enabling them to access medical and support services.    RN scheduled transportation per pt request,     location and  time: 12  Residency:   [facility] [address] [facility]  [address]  [facility] [address]  [phone] (home)       Appt Location and apt time:   5.4.24 at 1230 - PCP  [address] [facility]  Round trip    Denies any needs at this time. Denies any issues that need Doctor attention. Will call with concerns

## 2024-04-02 NOTE — PROGRESS NOTES
B20 follow up    Flagged [facility] issues. Housing is at [place]  housing.   [place] slipped at some point.   Denies H/S  Drug use-- meth/crack yesterday   Dec -- Feb in patient admits - multiple times (3)     Not taking any meds at all. History of non compliance.     Reviewed all lab work.     Lost 20 lbs     Skin CA per pt. Next derm apt is \"sometime this month\"     Reviewed scheduled apts.     Right foot continues issues.   \"No flare up now\"   Not taking bactrim.     Dentist -- needs follow up. [facility] in [facility] past. Denies wanting to go to dentist at all. Encouraged follow up    Colonoscopy 2011-- WNL - refuses to do another.

## 2024-04-05 ENCOUNTER — TELEPHONE (OUTPATIENT)
Dept: INFECTIOUS DISEASES | Age: 59
End: 2024-04-05

## 2024-04-05 NOTE — TELEPHONE ENCOUNTER
Pt called in needing help with transport for upcoming appt.   Pt needs [place] [employer] [facility] on Tuesday, 04/09/24.      LPN and pt reviewed additional transport options and assistance. It is determined he will use [facility] as last report for help.   Discussed transport policy. Discussed need to call 2 hours prior to apt time for cancellation. Pt verbalized all understanding.   [facility] notified. Pt aware transport will be there for  about 30 mins prior to apt.     All appts are related to medical treatment and are necessary for compliance  This transportation assistance will help patient remain in medical care by enabling them to access medical and support services.    LPN scheduled transportation per pt request,     location and  time: 9A  Residency:   [address] [facility]  [address] [place] [id number] [phone] (home)       Appt Location and apt time:   [address] [place] [address]  Round trip     Denies any needs at this time. Denies any issues that need Doctor attention. Will call with concerns    LPN and pt reviewed additional transport options and assistance. It is determined that pt will need transport assistance as last resort.     Pt states he is Aware of transport policy and  time. [facility] notified.   All appts are related to support services and are necessary for compliance.    This transportation assistance will help patient remain in medical care by enabling them to access medical and support services.

## 2024-04-08 ENCOUNTER — APPOINTMENT (OUTPATIENT)
Dept: OTOLARYNGOLOGY | Facility: CLINIC | Age: 59
End: 2024-04-08
Payer: MEDICARE

## 2024-04-08 ENCOUNTER — TELEPHONE (OUTPATIENT)
Dept: INFECTIOUS DISEASES | Age: 59
End: 2024-04-08

## 2024-04-08 NOTE — TELEPHONE ENCOUNTER
Per request, CM added transport after going to  Office  4/9/34 to this CM's office to provide income verification.     [facility]  [address]  [place] [address]    Per request, CM scheduled transport to appointment with Dermatology 5/1/24 @ 12:30PM; PCP 5/14/24 @ 12:30PM and ENT 6/4/24 @ 3PM. CM assessed need, arrangements as last resort.  Pt aware of Transportation Policy.     for appointments  Pt's residence   [address] [place] [address], [address]  [place] [address]    Destination  5/1/24- [address] [address]    5/14/24- [address] [address]    6/4/24- [address] [address]

## 2024-04-09 ENCOUNTER — TELEPHONE (OUTPATIENT)
Dept: INFECTIOUS DISEASES | Age: 59
End: 2024-04-09

## 2024-04-09 NOTE — TELEPHONE ENCOUNTER
Pt called in asking for AC letter. This will allow him to obtain AC unit for his room with out cost.     Reviewed with Dr Post    Per pt request - fax to: [phone]  Jones Root: FIORELLA for pt - [place]     Faxed with confirmation

## 2024-04-18 ENCOUNTER — TELEPHONE (OUTPATIENT)
Dept: INFECTIOUS DISEASES | Age: 59
End: 2024-04-18

## 2024-04-18 DIAGNOSIS — E04.1 THYROID NODULE: ICD-10-CM

## 2024-04-18 DIAGNOSIS — D48.5 NEOPLASM OF UNCERTAIN BEHAVIOR OF SKIN: Primary | ICD-10-CM

## 2024-04-18 NOTE — TELEPHONE ENCOUNTER
Pt called in stating he needed to cancel transport for 2 scheduled apts.  Cancel 4.22.24  Cancel 5.1.24    Pt states he can no longer see Dr King (derm) or   (ENT) due to insurance. States [facility] is not acctped.     [facility]. He will call office if there are issues with scheduling apts. He will reach out if transportation is needed.     Call placed to [facility]. Transport canceled with in appropriate time frame.

## 2024-04-22 ENCOUNTER — APPOINTMENT (OUTPATIENT)
Dept: OTOLARYNGOLOGY | Facility: CLINIC | Age: 59
End: 2024-04-22
Payer: MEDICARE

## 2024-04-29 RX ORDER — SULFAMETHOXAZOLE AND TRIMETHOPRIM 800; 160 MG/1; MG/1
1 TABLET ORAL
Qty: 12 TABLET | Refills: 1 | Status: SHIPPED | OUTPATIENT
Start: 2024-04-29

## 2024-04-29 RX ORDER — SULFAMETHOXAZOLE AND TRIMETHOPRIM 800; 160 MG/1; MG/1
1 TABLET ORAL
COMMUNITY
End: 2024-04-29 | Stop reason: SDUPTHER

## 2024-04-29 RX ORDER — ABACAVIR SULFATE, DOLUTEGRAVIR SODIUM, LAMIVUDINE 600; 50; 300 MG/1; MG/1; MG/1
TABLET, FILM COATED ORAL
Qty: 30 TABLET | Refills: 2 | Status: ACTIVE | OUTPATIENT
Start: 2024-04-29

## 2024-05-02 ENCOUNTER — TELEPHONE (OUTPATIENT)
Dept: FAMILY MEDICINE CLINIC | Age: 59
End: 2024-05-02

## 2024-05-02 NOTE — TELEPHONE ENCOUNTER
[facility] called to have pt two last visit notes faxed for pt to get a wheel chair please. If you have any questions please call Raj at [phone].    Fax: [phone]

## 2024-05-07 DIAGNOSIS — J44.1 COPD EXACERBATION (HCC): ICD-10-CM

## 2024-05-07 RX ORDER — ALBUTEROL SULFATE 90 UG/1
AEROSOL, METERED RESPIRATORY (INHALATION)
Qty: 6.7 G | Refills: 1 | Status: SHIPPED | OUTPATIENT
Start: 2024-05-07

## 2024-05-07 NOTE — PROGRESS NOTES
Pt asking for inhaler refill. X 1   PCP apt upcoming.   Ok to refill per Dr Benavides and give 1 RF

## 2024-05-09 ENCOUNTER — TELEPHONE (OUTPATIENT)
Dept: INFECTIOUS DISEASES | Age: 59
End: 2024-05-09

## 2024-05-09 NOTE — TELEPHONE ENCOUNTER
Pt called in needing help with transport for upcoming appt.       Rn and pt reviewed additional transport options and assistance. It is determined he will use [facility] as last report for help.   Discussed transport policy. Discussed need to call 2 hours prior to apt time for cancellation. Pt verbalized all understanding.   [facility] notified. Pt aware transport will be there for  about 30 mins prior to apt.     All appts are related to medical treatment and are necessary for compliance  This transportation assistance will help patient remain in medical care by enabling them to access medical and support services.    RN scheduled transportation per pt request,    1st transport:    location and  time: 12  Residency:   [address]  [address]  [facility] [address]  [phone] (home)     Appt Location and apt time:    5.14.24 at 1230 PCP   [address]  [place]      2nd transport:   Residency -  time: 2  [address]  [address]  [place] [address]  [phone] (home)      Appt Location date and time:    5.15.24 at 230 Derm  [address] [facility] [address]     3rd transport:    location and  time: 11   Residency:   [address]  [address]  [place] [address]  [phone] (home)   Apt location date and time:   5.23.24 at 1130 ENT  [address] [place]        Denies any needs at this time. Denies any issues that need Doctor attention. Will call with concerns

## 2024-05-13 ENCOUNTER — TELEPHONE (OUTPATIENT)
Dept: INFECTIOUS DISEASES | Age: 59
End: 2024-05-13

## 2024-05-13 NOTE — TELEPHONE ENCOUNTER
CM attempted to reach pt to remind him of transportation scheduled for 5/14/24 @ 12N for appointment with PCP.  Number is invalid.  CM also attempted to reach pt via text message.

## 2024-05-14 ENCOUNTER — OFFICE VISIT (OUTPATIENT)
Dept: FAMILY MEDICINE CLINIC | Age: 59
End: 2024-05-14
Payer: COMMERCIAL

## 2024-05-14 ENCOUNTER — TELEPHONE (OUTPATIENT)
Dept: INFECTIOUS DISEASES | Age: 59
End: 2024-05-14

## 2024-05-14 VITALS
HEIGHT: 69 IN | SYSTOLIC BLOOD PRESSURE: 113 MMHG | DIASTOLIC BLOOD PRESSURE: 81 MMHG | HEART RATE: 60 BPM | BODY MASS INDEX: 25.77 KG/M2 | OXYGEN SATURATION: 98 % | WEIGHT: 174 LBS | RESPIRATION RATE: 14 BRPM

## 2024-05-14 DIAGNOSIS — G89.29 CHRONIC LEFT SHOULDER PAIN: Primary | ICD-10-CM

## 2024-05-14 DIAGNOSIS — K21.9 GASTROESOPHAGEAL REFLUX DISEASE, UNSPECIFIED WHETHER ESOPHAGITIS PRESENT: ICD-10-CM

## 2024-05-14 DIAGNOSIS — M25.512 CHRONIC LEFT SHOULDER PAIN: Primary | ICD-10-CM

## 2024-05-14 DIAGNOSIS — L97.412 NEUROPATHIC ULCER OF RIGHT HEEL WITH FAT LAYER EXPOSED (HCC): ICD-10-CM

## 2024-05-14 DIAGNOSIS — M15.9 PRIMARY OSTEOARTHRITIS INVOLVING MULTIPLE JOINTS: ICD-10-CM

## 2024-05-14 DIAGNOSIS — J44.9 CHRONIC OBSTRUCTIVE PULMONARY DISEASE, UNSPECIFIED COPD TYPE (HCC): ICD-10-CM

## 2024-05-14 PROCEDURE — 99214 OFFICE O/P EST MOD 30 MIN: CPT | Performed by: NURSE PRACTITIONER

## 2024-05-14 PROCEDURE — G8427 DOCREV CUR MEDS BY ELIG CLIN: HCPCS | Performed by: NURSE PRACTITIONER

## 2024-05-14 PROCEDURE — 3017F COLORECTAL CA SCREEN DOC REV: CPT | Performed by: NURSE PRACTITIONER

## 2024-05-14 PROCEDURE — 3023F SPIROM DOC REV: CPT | Performed by: NURSE PRACTITIONER

## 2024-05-14 PROCEDURE — G8419 CALC BMI OUT NRM PARAM NOF/U: HCPCS | Performed by: NURSE PRACTITIONER

## 2024-05-14 PROCEDURE — 4004F PT TOBACCO SCREEN RCVD TLK: CPT | Performed by: NURSE PRACTITIONER

## 2024-05-14 RX ORDER — PANTOPRAZOLE SODIUM 40 MG/1
40 TABLET, DELAYED RELEASE ORAL DAILY
Qty: 90 TABLET | Refills: 1 | Status: SHIPPED | OUTPATIENT
Start: 2024-05-14

## 2024-05-14 NOTE — TELEPHONE ENCOUNTER
Pt called in needing help with transport for upcoming appt.       Rn and pt reviewed additional transport options and assistance. It is determined he will use [facility] as last report for help.   Discussed transport policy. Discussed need to call 2 hours prior to apt time for cancellation. Pt verbalized all understanding.   [facility] notified. Pt aware transport will be there for  about 30 mins prior to apt.     All appts are related to medical treatment and are necessary for compliance  This transportation assistance will help patient remain in medical care by enabling them to access medical and support services.    RN scheduled transportation per pt request,     location and  time: 12  Residency:   [address] [facility]  [address]  [place] [facility] [address]  [phone] (home)       Appt Location and apt time:   8.14.24 at 1230- [facility]  [address] [place]     Round trip    Denies any needs at this time. Denies any issues that need Doctor attention. Will call with concerns

## 2024-05-14 NOTE — PROGRESS NOTES
normal. No respiratory distress.      Breath sounds: Normal breath sounds.   Musculoskeletal:      Left shoulder: Tenderness and bony tenderness present. No swelling or deformity. Decreased range of motion.        Arms:       Cervical back: Normal range of motion.   Lymphadenopathy:      Cervical: No cervical adenopathy.   Skin:     General: Skin is warm and dry.   Neurological:      Mental Status: He is alert and oriented to person, place, and time.   Psychiatric:         Mood and Affect: Mood normal.         Behavior: Behavior normal.         Assessment:          Diagnosis Orders   1. Chronic left shoulder pain  External Referral - Josemanuel Salazar MD - Arthroscopy, Sports Med      2. Gastroesophageal reflux disease, unspecified whether esophagitis present  pantoprazole (PROTONIX) 40 MG tablet      3. Primary osteoarthritis involving multiple joints        4. Neuropathic ulcer of right heel with fat layer exposed (HCC)        5. Chronic obstructive pulmonary disease, unspecified COPD type (HCC)            Plan:      Orders Placed This Encounter   Procedures    External Referral - Josemanuel Salazar MD - Arthroscopy, Sports Med     Referral Priority:   Routine     Referral Type:   Eval and Treat     Referral Reason:   Specialty Services Required     Referred to Provider:   Josemanuel Salazar MD     Requested Specialty:   Orthopedic Surgery     Number of Visits Requested:   1          Orders Placed This Encounter   Medications    pantoprazole (PROTONIX) 40 MG tablet     Sig: Take 1 tablet by mouth daily     Dispense:  90 tablet     Refill:  1     1. Chronic left shoulder pain  Chronic, flaring.  - External Referral - Josemanuel Salazar MD - Arthroscopy, Sports Med    2. Gastroesophageal reflux disease, unspecified whether esophagitis present  Chronic, stable.  - pantoprazole (PROTONIX) 40 MG tablet; Take 1 tablet by mouth daily  Dispense: 90 tablet; Refill: 1    3. Primary osteoarthritis involving multiple joints  Chronic,

## 2024-05-20 ASSESSMENT — ENCOUNTER SYMPTOMS
COUGH: 1
SHORTNESS OF BREATH: 1
CONSTIPATION: 0
DIARRHEA: 0
WHEEZING: 1
ABDOMINAL PAIN: 0

## 2024-06-03 ENCOUNTER — TELEPHONE (OUTPATIENT)
Dept: INFECTIOUS DISEASES | Age: 59
End: 2024-06-03

## 2024-06-03 NOTE — TELEPHONE ENCOUNTER
CM called pt to remind him of transport scheduled 6/4/24 @ 3PM for appointment with ENT.  CM left VM.

## 2024-06-04 ENCOUNTER — TELEPHONE (OUTPATIENT)
Dept: INFECTIOUS DISEASES | Age: 59
End: 2024-06-04

## 2024-06-04 ENCOUNTER — APPOINTMENT (OUTPATIENT)
Dept: OTOLARYNGOLOGY | Facility: CLINIC | Age: 59
End: 2024-06-04
Payer: COMMERCIAL

## 2024-06-04 NOTE — TELEPHONE ENCOUNTER
Pt called in needing help with transport for upcoming appt.       Rn and pt reviewed additional transport options and assistance. It is determined he will use [facility] as last report for help.   Discussed transport policy. Discussed need to call 2 hours prior to apt time for cancellation. Pt verbalized all understanding.   [facility] notified. Pt aware transport will be there for  about 30 mins prior to apt.     All appts are related to medical treatment and are necessary for compliance  This transportation assistance will help patient remain in medical care by enabling them to access medical and support services.    RN scheduled transportation per pt request,    1st transport:    location and  time: 2  Residency:   [address]  [place] [id number]  [phone] (home)     Appt Location and apt time:    6.17.24 at 230 -- ortho  [address] [place]     2nd transport:   Residency -  time: 10  [address] [facility]  [address]  [place] [id number]  [phone] (home)      Appt Location date and time:    6.28.24 at 1030 - ENT  [address]     ALL round trips          Denies any needs at this time. Denies any issues that need Doctor attention. Will call with concerns

## 2024-06-17 ENCOUNTER — APPOINTMENT (OUTPATIENT)
Dept: ORTHOPEDIC SURGERY | Facility: CLINIC | Age: 59
End: 2024-06-17
Payer: COMMERCIAL

## 2024-06-21 ENCOUNTER — HOSPITAL ENCOUNTER (OUTPATIENT)
Dept: CARDIOLOGY | Facility: HOSPITAL | Age: 59
Discharge: HOME | End: 2024-06-21
Payer: COMMERCIAL

## 2024-06-21 ENCOUNTER — HOSPITAL ENCOUNTER (EMERGENCY)
Facility: HOSPITAL | Age: 59
Discharge: OTHER INSTITUTION | End: 2024-06-22
Attending: STUDENT IN AN ORGANIZED HEALTH CARE EDUCATION/TRAINING PROGRAM
Payer: COMMERCIAL

## 2024-06-21 DIAGNOSIS — F29 PSYCHOSIS, UNSPECIFIED PSYCHOSIS TYPE (MULTI): ICD-10-CM

## 2024-06-21 DIAGNOSIS — Z00.00 EVALUATION BY MEDICAL SERVICE REQUIRED: ICD-10-CM

## 2024-06-21 DIAGNOSIS — Z76.89 ENCOUNTER FOR PSYCHIATRIC ASSESSMENT: Primary | ICD-10-CM

## 2024-06-21 LAB
ALBUMIN SERPL BCP-MCNC: 4.4 G/DL (ref 3.4–5)
ALP SERPL-CCNC: 76 U/L (ref 33–120)
ALT SERPL W P-5'-P-CCNC: 17 U/L (ref 10–52)
ANION GAP SERPL CALC-SCNC: 12 MMOL/L (ref 10–20)
APAP SERPL-MCNC: <10 UG/ML
AST SERPL W P-5'-P-CCNC: 19 U/L (ref 9–39)
BASOPHILS # BLD AUTO: 0.02 X10*3/UL (ref 0–0.1)
BASOPHILS NFR BLD AUTO: 0.3 %
BILIRUB SERPL-MCNC: 0.7 MG/DL (ref 0–1.2)
BUN SERPL-MCNC: 18 MG/DL (ref 6–23)
CALCIUM SERPL-MCNC: 10.5 MG/DL (ref 8.6–10.3)
CHLORIDE SERPL-SCNC: 98 MMOL/L (ref 98–107)
CO2 SERPL-SCNC: 30 MMOL/L (ref 21–32)
CREAT SERPL-MCNC: 1.16 MG/DL (ref 0.5–1.3)
EGFRCR SERPLBLD CKD-EPI 2021: 73 ML/MIN/1.73M*2
EOSINOPHIL # BLD AUTO: 0.04 X10*3/UL (ref 0–0.7)
EOSINOPHIL NFR BLD AUTO: 0.6 %
ERYTHROCYTE [DISTWIDTH] IN BLOOD BY AUTOMATED COUNT: 13.5 % (ref 11.5–14.5)
ETHANOL SERPL-MCNC: <10 MG/DL
GLUCOSE SERPL-MCNC: 96 MG/DL (ref 74–99)
HCT VFR BLD AUTO: 45.2 % (ref 41–52)
HGB BLD-MCNC: 15.2 G/DL (ref 13.5–17.5)
HOLD SPECIMEN: NORMAL
IMM GRANULOCYTES # BLD AUTO: 0.06 X10*3/UL (ref 0–0.7)
IMM GRANULOCYTES NFR BLD AUTO: 0.9 % (ref 0–0.9)
LYMPHOCYTES # BLD AUTO: 1.63 X10*3/UL (ref 1.2–4.8)
LYMPHOCYTES NFR BLD AUTO: 23.3 %
MCH RBC QN AUTO: 29.5 PG (ref 26–34)
MCHC RBC AUTO-ENTMCNC: 33.6 G/DL (ref 32–36)
MCV RBC AUTO: 88 FL (ref 80–100)
MONOCYTES # BLD AUTO: 0.82 X10*3/UL (ref 0.1–1)
MONOCYTES NFR BLD AUTO: 11.7 %
NEUTROPHILS # BLD AUTO: 4.44 X10*3/UL (ref 1.2–7.7)
NEUTROPHILS NFR BLD AUTO: 63.2 %
NRBC BLD-RTO: 0 /100 WBCS (ref 0–0)
PLATELET # BLD AUTO: 282 X10*3/UL (ref 150–450)
POTASSIUM SERPL-SCNC: 4 MMOL/L (ref 3.5–5.3)
PROT SERPL-MCNC: 8.8 G/DL (ref 6.4–8.2)
RBC # BLD AUTO: 5.15 X10*6/UL (ref 4.5–5.9)
SALICYLATES SERPL-MCNC: <3 MG/DL
SARS-COV-2 RNA RESP QL NAA+PROBE: NOT DETECTED
SODIUM SERPL-SCNC: 136 MMOL/L (ref 136–145)
WBC # BLD AUTO: 7 X10*3/UL (ref 4.4–11.3)

## 2024-06-21 PROCEDURE — 99285 EMERGENCY DEPT VISIT HI MDM: CPT | Mod: CS

## 2024-06-21 PROCEDURE — 36415 COLL VENOUS BLD VENIPUNCTURE: CPT | Performed by: STUDENT IN AN ORGANIZED HEALTH CARE EDUCATION/TRAINING PROGRAM

## 2024-06-21 PROCEDURE — 87635 SARS-COV-2 COVID-19 AMP PRB: CPT | Performed by: STUDENT IN AN ORGANIZED HEALTH CARE EDUCATION/TRAINING PROGRAM

## 2024-06-21 PROCEDURE — 85025 COMPLETE CBC W/AUTO DIFF WBC: CPT | Performed by: STUDENT IN AN ORGANIZED HEALTH CARE EDUCATION/TRAINING PROGRAM

## 2024-06-21 PROCEDURE — 93005 ELECTROCARDIOGRAM TRACING: CPT

## 2024-06-21 PROCEDURE — 84075 ASSAY ALKALINE PHOSPHATASE: CPT | Performed by: STUDENT IN AN ORGANIZED HEALTH CARE EDUCATION/TRAINING PROGRAM

## 2024-06-21 PROCEDURE — 80143 DRUG ASSAY ACETAMINOPHEN: CPT | Performed by: STUDENT IN AN ORGANIZED HEALTH CARE EDUCATION/TRAINING PROGRAM

## 2024-06-21 SDOH — HEALTH STABILITY: MENTAL HEALTH: BEHAVIORS/MOOD: COOPERATIVE;PARANOID

## 2024-06-21 SDOH — HEALTH STABILITY: MENTAL HEALTH: BEHAVIORS/MOOD: SLEEPING

## 2024-06-21 SDOH — HEALTH STABILITY: MENTAL HEALTH: SLEEP PATTERN: UNABLE TO ASSESS

## 2024-06-21 SDOH — HEALTH STABILITY: MENTAL HEALTH: HALLUCINATION: AUDITORY;VISUAL

## 2024-06-21 SDOH — SOCIAL STABILITY: SOCIAL NETWORK: VISITOR BEHAVIORS: UNABLE TO ASSESS

## 2024-06-21 SDOH — HEALTH STABILITY: MENTAL HEALTH: NEEDS EXPRESSED: EMOTIONAL

## 2024-06-21 SDOH — HEALTH STABILITY: MENTAL HEALTH: DEPRESSION SYMPTOMS: NO PROBLEMS REPORTED OR OBSERVED.

## 2024-06-21 SDOH — SOCIAL STABILITY: SOCIAL INSECURITY: FAMILY BEHAVIORS: UNABLE TO ASSESS

## 2024-06-21 SDOH — SOCIAL STABILITY: SOCIAL NETWORK: EMOTIONAL SUPPORT GIVEN: REASSURE

## 2024-06-21 SDOH — HEALTH STABILITY: MENTAL HEALTH: SUICIDE ASSESSMENT: ADULT (C-SSRS)

## 2024-06-21 SDOH — HEALTH STABILITY: MENTAL HEALTH: HAVE YOU WISHED YOU WERE DEAD OR WISHED YOU COULD GO TO SLEEP AND NOT WAKE UP?: NO

## 2024-06-21 SDOH — SOCIAL STABILITY: SOCIAL NETWORK: PARENT/GUARDIAN/SIGNIFICANT OTHER INVOLVEMENT: NO INVOLVEMENT

## 2024-06-21 SDOH — HEALTH STABILITY: MENTAL HEALTH: CONTENT: BLAMING OTHERS

## 2024-06-21 SDOH — HEALTH STABILITY: MENTAL HEALTH: BEHAVIORS/MOOD: RESTLESS;COOPERATIVE;HALLUCINATIONS;IMPULSIVE;PARANOID

## 2024-06-21 SDOH — HEALTH STABILITY: MENTAL HEALTH: DELUSIONS: PARANOID

## 2024-06-21 SDOH — ECONOMIC STABILITY: HOUSING INSECURITY

## 2024-06-21 SDOH — HEALTH STABILITY: MENTAL HEALTH: HAVE YOU ACTUALLY HAD ANY THOUGHTS OF KILLING YOURSELF?: NO

## 2024-06-21 SDOH — HEALTH STABILITY: MENTAL HEALTH: NEEDS EXPRESSED: DENIES

## 2024-06-21 SDOH — HEALTH STABILITY: MENTAL HEALTH: HAVE YOU EVER DONE ANYTHING, STARTED TO DO ANYTHING, OR PREPARED TO DO ANYTHING TO END YOUR LIFE?: NO

## 2024-06-21 SDOH — SOCIAL STABILITY: SOCIAL NETWORK: EMOTIONAL SUPPORT GIVEN: OTHER (COMMENT)

## 2024-06-21 SDOH — HEALTH STABILITY: MENTAL HEALTH: ANXIETY SYMPTOMS: NO PROBLEMS REPORTED OR OBSERVED.

## 2024-06-21 ASSESSMENT — LIFESTYLE VARIABLES
EVER HAD A DRINK FIRST THING IN THE MORNING TO STEADY YOUR NERVES TO GET RID OF A HANGOVER: NO
HAVE PEOPLE ANNOYED YOU BY CRITICIZING YOUR DRINKING: NO
TOTAL SCORE: 0
EVER FELT BAD OR GUILTY ABOUT YOUR DRINKING: NO
HAVE YOU EVER FELT YOU SHOULD CUT DOWN ON YOUR DRINKING: NO

## 2024-06-21 ASSESSMENT — PAIN SCALES - GENERAL: PAINLEVEL_OUTOF10: 0 - NO PAIN

## 2024-06-21 ASSESSMENT — COLUMBIA-SUICIDE SEVERITY RATING SCALE - C-SSRS
2. HAVE YOU ACTUALLY HAD ANY THOUGHTS OF KILLING YOURSELF?: NO
6. HAVE YOU EVER DONE ANYTHING, STARTED TO DO ANYTHING, OR PREPARED TO DO ANYTHING TO END YOUR LIFE?: NO
1. IN THE PAST MONTH, HAVE YOU WISHED YOU WERE DEAD OR WISHED YOU COULD GO TO SLEEP AND NOT WAKE UP?: NO

## 2024-06-21 ASSESSMENT — PAIN - FUNCTIONAL ASSESSMENT: PAIN_FUNCTIONAL_ASSESSMENT: 0-10

## 2024-06-21 NOTE — ED PROVIDER NOTES
HPI   No chief complaint on file.      Patient is a 59-year-old male with history of schizophrenia, HIV not currently on his medications for either, who presents for psychiatric valuation.  Patient was pink slipped by [employer] after he reportedly called his  and stated there was a bomb in the building.  Patient denies any SI, HI, visual or sedations.  States he is hearing voices coming from the air conditioner in his apartment.  No recent illnesses, fevers, chills, vomiting, diarrhea.                        No data recorded                   Patient History   Past Medical History:   Diagnosis Date   • Disorder of the skin and subcutaneous tissue, unspecified 06/28/2019    Skin sore   • Encounter for examination of eyes and vision without abnormal findings 07/22/2021    Eye exam, routine   • Encounter for general adult medical examination without abnormal findings 07/27/2022    Encounter for Medicare annual wellness exam   • Encounter for immunization     Encounter for immunization   • Encounter for screening for malignant neoplasm of colon 06/02/2021    Encounter for colorectal cancer screening   • Encounter for screening for malignant neoplasm of prostate 07/22/2021    Encounter for prostate cancer screening   • Hiccough     Hiccups   • Human immunodeficiency virus (HIV) disease (Multi)     HIV (human immunodeficiency virus infection)   • Other spondylosis with radiculopathy, lumbar region     Osteoarthritis of spine with radiculopathy, lumbar region   • Personal history of other diseases of the musculoskeletal system and connective tissue 06/28/2019    History of fibromyalgia   • Personal history of other diseases of the respiratory system     History of asthma   • Personal history of other endocrine, nutritional and metabolic disease     History of vitamin D deficiency   • Personal history of other infectious and parasitic diseases 09/23/2020    History of candidiasis of mouth   • Personal history of  other specified conditions 06/28/2019    History of chronic pain   • Personal history of other specified conditions 02/10/2021    History of insomnia     Past Surgical History:   Procedure Laterality Date   • OTHER SURGICAL HISTORY  11/10/2020    Foot surgery     No family history on file.  Social History     Tobacco Use   • Smoking status: Every Day     Current packs/day: 2.00     Types: Cigarettes     Passive exposure: Current   • Smokeless tobacco: Never   Vaping Use   • Vaping status: Never Used   Substance Use Topics   • Alcohol use: Not Currently   • Drug use: Not Currently       Physical Exam   ED Triage Vitals   Temp Pulse Resp BP   -- -- -- --      SpO2 Temp src Heart Rate Source Patient Position   -- -- -- --      BP Location FiO2 (%)     -- --       Physical Exam  Constitutional:       General: He is not in acute distress.  HENT:      Head: Normocephalic.   Eyes:      Extraocular Movements: Extraocular movements intact.      Conjunctiva/sclera: Conjunctivae normal.      Pupils: Pupils are equal, round, and reactive to light.   Cardiovascular:      Rate and Rhythm: Normal rate and regular rhythm.      Pulses: Normal pulses.      Heart sounds: Normal heart sounds.   Pulmonary:      Effort: Pulmonary effort is normal.      Breath sounds: Normal breath sounds.   Abdominal:      General: There is no distension.      Palpations: Abdomen is soft. There is no mass.      Tenderness: There is no abdominal tenderness. There is no guarding.   Musculoskeletal:         General: No deformity.      Cervical back: Normal range of motion and neck supple.      Right lower leg: No edema.      Left lower leg: No edema.   Skin:     General: Skin is warm and dry.      Findings: No lesion or rash.   Neurological:      General: No focal deficit present.      Mental Status: He is alert and oriented to person, place, and time. Mental status is at baseline.      Cranial Nerves: No cranial nerve deficit.      Sensory: No sensory  deficit.      Motor: No weakness.   Psychiatric:         Mood and Affect: Mood normal.       ED Course & MDM   Diagnoses as of 06/21/24 1945   Encounter for psychiatric assessment       Medical Decision Making  EKG my interpretation: Rate 72, rhythm regular, axis normal, , QRS 80, QTc 435, T waves: Unremarkable, ST segments: No elevations or depressions, interpretation: Normal sinus rhythm, no STEMI    Patient is a 59-year-old male with above-stated past medical history presents for psychiatric evaluation.  Patient's laboratory work is grossly unremarkable, urinalysis and urine drug screen are pending.  Patient has been clinically stable and has had no acute events.  Patient was handed off pending EPAT  placement.    Disclaimer: This note was dictated using speech recognition software. Minor errors in transcription may be present. Please call if questions.     Lauri Edwrads MD  [facility]  Contact on [employer]        Problems Addressed:  Encounter for psychiatric assessment: acute illness or injury    Amount and/or Complexity of Data Reviewed  Labs: ordered.        Procedure  Procedures     Lauri Edwards MD  06/21/24 3152

## 2024-06-22 VITALS
BODY MASS INDEX: 24.73 KG/M2 | HEART RATE: 77 BPM | OXYGEN SATURATION: 99 % | SYSTOLIC BLOOD PRESSURE: 120 MMHG | WEIGHT: 167 LBS | TEMPERATURE: 98.3 F | DIASTOLIC BLOOD PRESSURE: 83 MMHG | RESPIRATION RATE: 15 BRPM | HEIGHT: 69 IN

## 2024-06-22 LAB
AMPHETAMINES UR QL SCN: ABNORMAL
APPEARANCE UR: CLEAR
BARBITURATES UR QL SCN: ABNORMAL
BENZODIAZ UR QL SCN: ABNORMAL
BILIRUB UR STRIP.AUTO-MCNC: ABNORMAL MG/DL
BZE UR QL SCN: ABNORMAL
CANNABINOIDS UR QL SCN: ABNORMAL
CK SERPL-CCNC: 44 U/L (ref 0–325)
COLOR UR: YELLOW
FENTANYL+NORFENTANYL UR QL SCN: ABNORMAL
GLUCOSE UR STRIP.AUTO-MCNC: NORMAL MG/DL
HOLD SPECIMEN: NORMAL
HYALINE CASTS #/AREA URNS AUTO: ABNORMAL /LPF
KETONES UR STRIP.AUTO-MCNC: ABNORMAL MG/DL
LEUKOCYTE ESTERASE UR QL STRIP.AUTO: ABNORMAL
METHADONE UR QL SCN: ABNORMAL
MUCOUS THREADS #/AREA URNS AUTO: ABNORMAL /LPF
NITRITE UR QL STRIP.AUTO: NEGATIVE
OPIATES UR QL SCN: ABNORMAL
OXYCODONE+OXYMORPHONE UR QL SCN: ABNORMAL
PCP UR QL SCN: ABNORMAL
PH UR STRIP.AUTO: 6 [PH]
PROT UR STRIP.AUTO-MCNC: ABNORMAL MG/DL
RBC # UR STRIP.AUTO: NEGATIVE /UL
RBC #/AREA URNS AUTO: ABNORMAL /HPF
SP GR UR STRIP.AUTO: 1.03
UROBILINOGEN UR STRIP.AUTO-MCNC: ABNORMAL MG/DL
WBC #/AREA URNS AUTO: ABNORMAL /HPF

## 2024-06-22 PROCEDURE — 36415 COLL VENOUS BLD VENIPUNCTURE: CPT | Performed by: EMERGENCY MEDICINE

## 2024-06-22 PROCEDURE — 81001 URINALYSIS AUTO W/SCOPE: CPT | Performed by: STUDENT IN AN ORGANIZED HEALTH CARE EDUCATION/TRAINING PROGRAM

## 2024-06-22 PROCEDURE — 82550 ASSAY OF CK (CPK): CPT | Performed by: EMERGENCY MEDICINE

## 2024-06-22 PROCEDURE — 96372 THER/PROPH/DIAG INJ SC/IM: CPT | Performed by: EMERGENCY MEDICINE

## 2024-06-22 PROCEDURE — 80307 DRUG TEST PRSMV CHEM ANLYZR: CPT | Performed by: STUDENT IN AN ORGANIZED HEALTH CARE EDUCATION/TRAINING PROGRAM

## 2024-06-22 PROCEDURE — 87086 URINE CULTURE/COLONY COUNT: CPT | Mod: ELYLAB | Performed by: STUDENT IN AN ORGANIZED HEALTH CARE EDUCATION/TRAINING PROGRAM

## 2024-06-22 PROCEDURE — 2500000004 HC RX 250 GENERAL PHARMACY W/ HCPCS (ALT 636 FOR OP/ED): Performed by: EMERGENCY MEDICINE

## 2024-06-22 RX ORDER — ZIPRASIDONE MESYLATE 20 MG/ML
20 INJECTION, POWDER, LYOPHILIZED, FOR SOLUTION INTRAMUSCULAR EVERY 12 HOURS PRN
Status: DISCONTINUED | OUTPATIENT
Start: 2024-06-22 | End: 2024-06-22 | Stop reason: HOSPADM

## 2024-06-22 RX ADMIN — ZIPRASIDONE MESYLATE 20 MG: 20 INJECTION, POWDER, LYOPHILIZED, FOR SOLUTION INTRAMUSCULAR at 08:35

## 2024-06-22 SDOH — HEALTH STABILITY: MENTAL HEALTH: BEHAVIORS/MOOD: SLEEPING

## 2024-06-22 SDOH — HEALTH STABILITY: MENTAL HEALTH: DELUSIONS: PARANOID

## 2024-06-22 SDOH — HEALTH STABILITY: MENTAL HEALTH: BEHAVIORS/MOOD: PARANOID

## 2024-06-22 SDOH — HEALTH STABILITY: MENTAL HEALTH: HALLUCINATION: AUDITORY

## 2024-06-22 SDOH — HEALTH STABILITY: MENTAL HEALTH: BEHAVIORS/MOOD: CALM

## 2024-06-22 SDOH — HEALTH STABILITY: MENTAL HEALTH: BEHAVIORS/MOOD: ANXIOUS

## 2024-06-22 SDOH — SOCIAL STABILITY: SOCIAL INSECURITY: FAMILY BEHAVIORS: UNABLE TO ASSESS

## 2024-06-22 SDOH — HEALTH STABILITY: MENTAL HEALTH: HAVE YOU EVER DONE ANYTHING, STARTED TO DO ANYTHING, OR PREPARED TO DO ANYTHING TO END YOUR LIFE?: NO

## 2024-06-22 SDOH — HEALTH STABILITY: MENTAL HEALTH: HAVE YOU WISHED YOU WERE DEAD OR WISHED YOU COULD GO TO SLEEP AND NOT WAKE UP?: NO

## 2024-06-22 SDOH — HEALTH STABILITY: MENTAL HEALTH: SLEEP PATTERN: SLEEPS ALL NIGHT

## 2024-06-22 SDOH — HEALTH STABILITY: MENTAL HEALTH: SUICIDE ASSESSMENT: ADULT (C-SSRS)

## 2024-06-22 SDOH — HEALTH STABILITY: MENTAL HEALTH: CONTENT: UNABLE TO ASSESS

## 2024-06-22 SDOH — SOCIAL STABILITY: SOCIAL NETWORK: EMOTIONAL SUPPORT GIVEN: REASSURE

## 2024-06-22 SDOH — HEALTH STABILITY: MENTAL HEALTH: NEEDS EXPRESSED: EMOTIONAL

## 2024-06-22 SDOH — HEALTH STABILITY: MENTAL HEALTH: HAVE YOU ACTUALLY HAD ANY THOUGHTS OF KILLING YOURSELF?: NO

## 2024-06-22 SDOH — SOCIAL STABILITY: SOCIAL NETWORK: VISITOR BEHAVIORS: UNABLE TO ASSESS

## 2024-06-22 SDOH — SOCIAL STABILITY: SOCIAL NETWORK: PARENT/GUARDIAN/SIGNIFICANT OTHER INVOLVEMENT: NO INVOLVEMENT

## 2024-06-22 ASSESSMENT — PAIN SCALES - GENERAL: PAINLEVEL_OUTOF10: 0 - NO PAIN

## 2024-06-22 NOTE — PROGRESS NOTES
"Emergency Medicine Transition of Care Note.    I received Christofer Walter in signout from Dr. Huizar.  Please see the previous ED provider note for all HPI, PE and MDM up to the time of signout at 0700. This is in addition to the primary record.    In brief Christofer Walter is an 59 y.o. male presenting for   Chief Complaint   Patient presents with    Psychiatric Evaluation     Pt arrives via EMS with a pink slip from [facility], pt threatened to bomb his apartment building. Pt states that \"the management allows the residents to run the place, its a mess.\" He also states \"I will never take my  meds\" Patient is internally stimulated, talking out loud.     At the time of signout we were awaiting: Placement    Diagnoses as of 06/22/24 0745   Encounter for psychiatric assessment   Psychosis, unspecified psychosis type (Multi)       Medical Decision Making      Final diagnoses:   [Z76.89] Encounter for psychiatric assessment   [F29] Psychosis, unspecified psychosis type (Multi)     Patient was excepted for transfer to [facility].  They did request a CK.  His CK is 44.  Therefore he is medically cleared.  He did request to be medicated.  He states has been off his medications for some time.  After looking at the EKG and noted that he has a normal QTc he was given IM Zyprexa      Procedure  Procedures    Veronica Villatoro MD   "

## 2024-06-22 NOTE — SIGNIFICANT EVENT
Application for Emergency Admission      Ready for Transfer?  Is the patient medically cleared for transfer to inpatient psychiatry: Yes  Has the patient been accepted to an inpatient psychiatric hospital: Yes    Application for Emergency Admission  IN ACCORDANCE WITH [place]122[place]10 [place]  The Chief Clinical Officer of: [employer] 6/22/2024 .1:10 PM    Reason for Hospitalization  The undersigned has reason to believe that: Christofer Walter Is a mentally ill person subject to hospitalization by court order under division B Section 5122.01 of the Revised Code, i.e., this person:    1.Yes  Represents a substantial risk of physical harm to self as manifested by evidence of threats of, or attempts at, suicide or serious self-inflicted bodily harm    2.Yes Represents a substantial risk of physical harm to others as manifested by evidence of recent homicidal or other violent behavior, evidence of recent threats that place another in reasonable fear of violent behavior and serious physical harm, or other evidence of present dangerousness    3.Yes Represents a substantial and immediate risk of serious physical impairment or injury to self as manifested by  evidence that the person is unable to provide for and is not providing for the person's basic physical needs because of the person's mental illness and that appropriate provision for those needs cannot be made  immediately available in the community    4.Yes Would benefit from treatment in a hospital for his mental illness and is in need of such treatment as manifested by evidence of behavior that creates a grave and imminent risk to substantial rights of others or  himself.    5.Yes Would benefit from treatment as manifested by evidence of behavior that indicates all of the following:       (a) The person is unlikely to survive safely in the community without supervision, based on a clinical determination.       (b) The person has a history of lack of compliance with  treatment for mental illness and one of the following applies:      (i) At least twice within the thirty-six months prior to the filing of an affidavit seeking court-ordered treatment of the person under section 5122.111 of the Revised Code, the lack of compliance has been a significant factor in necessitating hospitalization in a hospital or receipt of services in a forensic or other mental health unit of a correctional facility, provided that the thirty-six-month period shall be extended by the length of any hospitalization or incarceration of the person that occurred within the thirty-six-month period.      (ii) Within the forty-eight months prior to the filing of an affidavit seeking court-ordered treatment of the person under section 5122.111 of the Revised Code, the lack of compliance resulted in one or more acts of serious violent behavior toward self or others or threats of, or attempts at, serious physical harm to self or others, provided that the forty-eight-month period shall be extended by the length of any hospitalization or incarceration of the person that occurred within the forty-eight-month period.      (c) The person, as a result of mental illness, is unlikely to voluntarily participate in necessary treatment.       (d) In view of the person's treatment history and current behavior, the person is in need of treatment in order to prevent a relapse or deterioration that would be likely to result in substantial risk of serious harm to the person or others.    (e) Represents a substantial risk of physical harm to self or others if allowed to remain at liberty pending examination.    Therefore, it is requested that said person be admitted to the above named facility.    STATEMENT OF BELIEF    Must be filled out by one of the following: a psychiatrist, licensed physician, licensed clinical psychologist, health or ,  or .  (Statement shall include the circumstances under  which the individual was taken into custody and the reason for the person's belief that hospitalization is necessary. The statement shall also include a reference to efforts made to secure the individual's property at his residence if he was taken into custody there. Every reasonable and appropriate effort should be made to take this person into custody in the least conspicuous manner possible.)    Psychosis     Veronica Villatoro MD 6/22/2024     _____________________________________________________________   Place of Employment: [facility]    STATEMENT OF OBSERVATION BY PSYCHIATRIST, LICENSED PHYSICIAN, OR LICENSED CLINICAL PSYCHOLOGIST, IF APPLICABLE    Place of Observation (e.g., [facility], general hospital, office, emergency facility)  (If applicable, please complete)    Veronica Villatoro MD 6/22/2024    _____________________________________________________________
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
## 2024-06-23 LAB — BACTERIA UR CULT: NORMAL

## 2024-06-24 PROCEDURE — 93005 ELECTROCARDIOGRAM TRACING: CPT

## 2024-06-25 ENCOUNTER — TELEPHONE (OUTPATIENT)
Dept: INFECTIOUS DISEASES | Age: 59
End: 2024-06-25

## 2024-06-25 DIAGNOSIS — B20 HIV INFECTION, UNSPECIFIED SYMPTOM STATUS (HCC): Primary | ICD-10-CM

## 2024-06-25 DIAGNOSIS — E55.9 VITAMIN D DEFICIENCY: ICD-10-CM

## 2024-06-25 LAB
ATRIAL RATE: 72 BPM
P AXIS: 65 DEGREES
P OFFSET: 217 MS
P ONSET: 159 MS
PR INTERVAL: 132 MS
Q ONSET: 225 MS
QRS COUNT: 12 BEATS
QRS DURATION: 88 MS
QT INTERVAL: 398 MS
QTC CALCULATION(BAZETT): 435 MS
QTC FREDERICIA: 423 MS
R AXIS: 84 DEGREES
T AXIS: 65 DEGREES
T OFFSET: 424 MS
VENTRICULAR RATE: 72 BPM

## 2024-07-17 ENCOUNTER — TELEPHONE (OUTPATIENT)
Dept: INFECTIOUS DISEASES | Age: 59
End: 2024-07-17

## 2024-07-17 DIAGNOSIS — B20 HUMAN IMMUNODEFICIENCY VIRUS (HIV) DISEASE (MULTI): Primary | ICD-10-CM

## 2024-07-17 DIAGNOSIS — E55.9 VITAMIN D DEFICIENCY, UNSPECIFIED: ICD-10-CM

## 2024-07-18 ENCOUNTER — LAB (OUTPATIENT)
Dept: LAB | Facility: LAB | Age: 59
End: 2024-07-18
Payer: COMMERCIAL

## 2024-07-18 DIAGNOSIS — E55.9 VITAMIN D DEFICIENCY, UNSPECIFIED: ICD-10-CM

## 2024-07-18 DIAGNOSIS — B20 HUMAN IMMUNODEFICIENCY VIRUS (HIV) DISEASE (MULTI): ICD-10-CM

## 2024-07-18 LAB
25(OH)D3 SERPL-MCNC: 31 NG/ML (ref 30–100)
ALBUMIN SERPL BCP-MCNC: 4.7 G/DL (ref 3.4–5)
ALP SERPL-CCNC: 88 U/L (ref 33–120)
ALT SERPL W P-5'-P-CCNC: 16 U/L (ref 10–52)
ANION GAP SERPL CALC-SCNC: 11 MMOL/L (ref 10–20)
AST SERPL W P-5'-P-CCNC: 19 U/L (ref 9–39)
BASOPHILS # BLD AUTO: 0.05 X10*3/UL (ref 0–0.1)
BASOPHILS NFR BLD AUTO: 0.7 %
BILIRUB SERPL-MCNC: 0.5 MG/DL (ref 0–1.2)
BUN SERPL-MCNC: 19 MG/DL (ref 6–23)
CALCIUM SERPL-MCNC: 9.7 MG/DL (ref 8.6–10.3)
CHLORIDE SERPL-SCNC: 101 MMOL/L (ref 98–107)
CO2 SERPL-SCNC: 29 MMOL/L (ref 21–32)
CREAT SERPL-MCNC: 1.02 MG/DL (ref 0.5–1.3)
EGFRCR SERPLBLD CKD-EPI 2021: 86 ML/MIN/1.73M*2
EOSINOPHIL # BLD AUTO: 0.08 X10*3/UL (ref 0–0.7)
EOSINOPHIL NFR BLD AUTO: 1.1 %
ERYTHROCYTE [DISTWIDTH] IN BLOOD BY AUTOMATED COUNT: 14 % (ref 11.5–14.5)
ERYTHROCYTE [DISTWIDTH] IN BLOOD BY AUTOMATED COUNT: 14 % (ref 11.5–14.5)
GLUCOSE SERPL-MCNC: 87 MG/DL (ref 74–99)
HCT VFR BLD AUTO: 50.8 % (ref 41–52)
HCT VFR BLD AUTO: 50.8 % (ref 41–52)
HGB BLD-MCNC: 16.4 G/DL (ref 13.5–17.5)
HGB BLD-MCNC: 16.4 G/DL (ref 13.5–17.5)
IMM GRANULOCYTES # BLD AUTO: 0.03 X10*3/UL (ref 0–0.7)
IMM GRANULOCYTES NFR BLD AUTO: 0.4 % (ref 0–0.9)
LYMPHOCYTES # BLD AUTO: 1.78 X10*3/UL (ref 1.2–4.8)
LYMPHOCYTES NFR BLD AUTO: 25.1 %
MCH RBC QN AUTO: 30 PG (ref 26–34)
MCH RBC QN AUTO: 30 PG (ref 26–34)
MCHC RBC AUTO-ENTMCNC: 32.3 G/DL (ref 32–36)
MCHC RBC AUTO-ENTMCNC: 32.3 G/DL (ref 32–36)
MCV RBC AUTO: 93 FL (ref 80–100)
MCV RBC AUTO: 93 FL (ref 80–100)
MONOCYTES # BLD AUTO: 0.66 X10*3/UL (ref 0.1–1)
MONOCYTES NFR BLD AUTO: 9.3 %
NEUTROPHILS # BLD AUTO: 4.5 X10*3/UL (ref 1.2–7.7)
NEUTROPHILS NFR BLD AUTO: 63.4 %
NRBC BLD-RTO: 0 /100 WBCS (ref 0–0)
NRBC BLD-RTO: 0 /100 WBCS (ref 0–0)
PLATELET # BLD AUTO: 243 X10*3/UL (ref 150–450)
PLATELET # BLD AUTO: 243 X10*3/UL (ref 150–450)
POTASSIUM SERPL-SCNC: 4 MMOL/L (ref 3.5–5.3)
PROT SERPL-MCNC: 8.2 G/DL (ref 6.4–8.2)
RBC # BLD AUTO: 5.46 X10*6/UL (ref 4.5–5.9)
RBC # BLD AUTO: 5.46 X10*6/UL (ref 4.5–5.9)
SODIUM SERPL-SCNC: 137 MMOL/L (ref 136–145)
WBC # BLD AUTO: 7.1 X10*3/UL (ref 4.4–11.3)
WBC # BLD AUTO: 7.1 X10*3/UL (ref 4.4–11.3)

## 2024-07-18 PROCEDURE — 88185 FLOWCYTOMETRY/TC ADD-ON: CPT

## 2024-07-18 PROCEDURE — 87536 HIV-1 QUANT&REVRSE TRNSCRPJ: CPT

## 2024-07-18 PROCEDURE — 85025 COMPLETE CBC W/AUTO DIFF WBC: CPT

## 2024-07-18 PROCEDURE — 80053 COMPREHEN METABOLIC PANEL: CPT

## 2024-07-18 PROCEDURE — 82306 VITAMIN D 25 HYDROXY: CPT

## 2024-07-18 PROCEDURE — 36415 COLL VENOUS BLD VENIPUNCTURE: CPT

## 2024-07-18 PROCEDURE — 86481 TB AG RESPONSE T-CELL SUSP: CPT

## 2024-07-18 PROCEDURE — 86780 TREPONEMA PALLIDUM: CPT

## 2024-07-18 PROCEDURE — 88184 FLOWCYTOMETRY/ TC 1 MARKER: CPT

## 2024-07-19 ENCOUNTER — TELEPHONE (OUTPATIENT)
Dept: INFECTIOUS DISEASES | Age: 59
End: 2024-07-19

## 2024-07-19 LAB
HIV1 RNA # PLAS NAA DL=20: 66 COPIES/ML
HIV1 RNA # PLAS NAA DL=20: DETECTED {COPIES}/ML
HIV1 RNA SPEC NAA+PROBE-LOG#: 1.82 LOG10 CPY/ML
TREPONEMA PALLIDUM IGG+IGM AB [PRESENCE] IN SERUM OR PLASMA BY IMMUNOASSAY: NONREACTIVE

## 2024-07-19 NOTE — TELEPHONE ENCOUNTER
Pt called in needing help with transport for upcoming appt.     Reviewed resulted lab work. Completed labs at [place]  VL and CD4 not back yet. Pt is not on ART. Discussed at length. Pt is aware of importance. Refuses ART at this time.         Rn and pt reviewed additional transport options and assistance. It is determined he will use [facility] as last report for help.   Discussed transport policy. Discussed need to call 2 hours prior to apt time for cancellation. Pt verbalized all understanding.   [facility] notified. Pt aware transport will be there for  about 30 mins prior to apt.     All appts are related to medical treatment and are necessary for compliance  This transportation assistance will help patient remain in medical care by enabling them to access medical and support services.    RN scheduled transportation per pt request,     location and  time: 245  Residency:   [address] [facility]  [address] [place] [address]  [phone] (home)       Appt Location and apt time:   10.17.24 at 315 -- [facility]  [address] [facility]     Round trip    Denies any needs at this time. Denies any issues that need Doctor attention. Will call with concerns

## 2024-07-20 LAB
NIL(NEG) CONTROL SPOT COUNT: NORMAL
PANEL A SPOT COUNT: 2
PANEL B SPOT COUNT: 0
POS CONTROL SPOT COUNT: NORMAL
T-SPOT. TB INTERPRETATION: NEGATIVE

## 2024-07-21 LAB
CD3+CD4+ CELLS # BLD: 0.34 X10E9/L
CD3+CD4+ CELLS # BLD: 338 /MM3
CD3+CD4+ CELLS NFR BLD: 19 %
CD3+CD4+ CELLS/CD3+CD8+ CLL BLD: 0.37 %
CD3+CD8+ CELLS # BLD: 0.93 X10E9/L
CD3+CD8+ CELLS NFR BLD: 52 %
LYMPHOCYTES # SPEC AUTO: 1.78 X10*3/UL

## 2024-07-22 ENCOUNTER — TELEPHONE (OUTPATIENT)
Dept: INFECTIOUS DISEASES | Age: 59
End: 2024-07-22

## 2024-07-22 NOTE — TELEPHONE ENCOUNTER
Pt called in requesting to review recent lab work.    Reviewed resulted labs. VL 66 and CD4 350    Adherence- non compliant. Not taking ART. States he does want to restart but will think about it. Wanted to get \"phyc meds out of my system\"      Denies any needs at this time. Denies any issues that need Doctor attention. Will call with concerns

## 2024-07-26 ENCOUNTER — TELEPHONE (OUTPATIENT)
Dept: INFECTIOUS DISEASES | Age: 59
End: 2024-07-26

## 2024-07-26 NOTE — TELEPHONE ENCOUNTER
Pt called in needing help with transport for upcoming appt.       Rn and pt reviewed additional transport options and assistance. It is determined he will use [facility] as last report for help.   Discussed transport policy. Discussed need to call 2 hours prior to apt time for cancellation. Pt verbalized all understanding.   [employer] notified. Pt aware transport will be there for  about 30 mins prior to apt.     All appts are related to medical treatment and are necessary for compliance  This transportation assistance will help patient remain in medical care by enabling them to access medical and support services.    RN scheduled transportation per pt request,     location and  time: 1  Residency:   [address] [facility]  [address]  [facility] [address]  [phone] (home)       Appt Location and apt time:   8.1.24 at 130 -- [facility]  [address] [place]      Round trip    Denies any needs at this time. Denies any issues that need Doctor attention. Will call with concerns

## 2024-07-31 ENCOUNTER — TELEPHONE (OUTPATIENT)
Dept: INFECTIOUS DISEASES | Age: 59
End: 2024-07-31

## 2024-07-31 NOTE — TELEPHONE ENCOUNTER
Pt called in needing help with transport for upcoming appt.   We reviewed scheduled transport for tomorrow as well.       Rn and pt reviewed additional transport options and assistance. It is determined he will use [facility] as last report for help.   Discussed transport policy. Discussed need to call 2 hours prior to apt time for cancellation. Pt verbalized all understanding.   [facility] notified. Pt aware transport will be there for  about 30 mins prior to apt.     All appts are related to medical treatment and are necessary for compliance  This transportation assistance will help patient remain in medical care by enabling them to access medical and support services.    RN scheduled transportation per pt request,     location and  time: 12  Residency:   [address] [facility]  [address]  [place] [id number]  [phone] (home)       Appt Location and apt time:   8.14.24 at 1230 -- [facility]   [address] [facility] [address] [place]     Round trip    Denies any needs at this time. Denies any issues that need Doctor attention. Will call with concerns

## 2024-08-08 ENCOUNTER — TELEPHONE (OUTPATIENT)
Dept: INFECTIOUS DISEASES | Age: 59
End: 2024-08-08

## 2024-08-08 NOTE — TELEPHONE ENCOUNTER
Pt called in needing help with transport for upcoming appt.     Housing issues. [facility] Ev[facility]. MH related. Follows with [facility] as of now. Will follow up with NP at [facility].       Rn and pt reviewed additional transport options and assistance. It is determined he will use [facility] as last report for help.   Discussed transport policy. Discussed need to call 2 hours prior to apt time for cancellation. Pt verbalized all understanding.   [employer] notified. Pt aware transport will be there for  about 30 mins prior to apt.     All appts are related to medical treatment and are necessary for compliance  This transportation assistance will help patient remain in medical care by enabling them to access medical and support services.    RN scheduled transportation per pt request,     location and  time: 115  Residency:   [facility] [address] [facility]  [address] [place], [place] [id number] [phone] (home)       Appt Location and apt time:   9.4.24 at 145 -- [facility]  [address] [facility]       Round trip    Denies any needs at this time. Denies any issues that need Doctor attention. Will call with concerns

## 2024-08-09 ENCOUNTER — TELEPHONE (OUTPATIENT)
Dept: INFECTIOUS DISEASES | Age: 59
End: 2024-08-09

## 2024-08-09 NOTE — TELEPHONE ENCOUNTER
Pt called in needing help with transport for upcoming appt.       Rn and pt reviewed additional transport options and assistance. It is determined he will use [facility] as last report for help.   Discussed transport policy. Discussed need to call 2 hours prior to apt time for cancellation. Pt verbalized all understanding.   [employer] notified. Pt aware transport will be there for  about 30 mins prior to apt.     All appts are related to medical treatment and are necessary for compliance  This transportation assistance will help patient remain in medical care by enabling them to access medical and support services.    RN scheduled transportation per pt request,     location and  time: 8  Residency:   [address] [facility]  [address]  [facility] [address]  [phone] (home)       Appt Date, Location and apt time:   8.22.24 at 830 -- [facility]  [address] [place]     Round trip    Denies any needs at this time. Denies any issues that need Doctor attention. Will call with concerns

## 2024-08-13 ENCOUNTER — TELEPHONE (OUTPATIENT)
Dept: INFECTIOUS DISEASES | Age: 59
End: 2024-08-13

## 2024-08-13 NOTE — TELEPHONE ENCOUNTER
Call placed to pt as transport reminder for upcoming apt.     Reviewed apt date and time. 8.14.24 at 1230     at 12       VM left as reminder

## 2024-08-14 ENCOUNTER — OFFICE VISIT (OUTPATIENT)
Dept: FAMILY MEDICINE CLINIC | Age: 59
End: 2024-08-14
Payer: COMMERCIAL

## 2024-08-14 VITALS
OXYGEN SATURATION: 98 % | BODY MASS INDEX: 24.73 KG/M2 | HEART RATE: 77 BPM | WEIGHT: 167 LBS | SYSTOLIC BLOOD PRESSURE: 101 MMHG | RESPIRATION RATE: 14 BRPM | DIASTOLIC BLOOD PRESSURE: 76 MMHG | HEIGHT: 69 IN

## 2024-08-14 DIAGNOSIS — F41.9 ANXIETY: ICD-10-CM

## 2024-08-14 DIAGNOSIS — F33.3 MDD (MAJOR DEPRESSIVE DISORDER), RECURRENT, SEVERE, WITH PSYCHOSIS (HCC): Chronic | ICD-10-CM

## 2024-08-14 DIAGNOSIS — G89.29 CHRONIC LEFT SHOULDER PAIN: Primary | ICD-10-CM

## 2024-08-14 DIAGNOSIS — J44.1 COPD EXACERBATION (HCC): ICD-10-CM

## 2024-08-14 DIAGNOSIS — F19.10 POLYSUBSTANCE ABUSE (HCC): ICD-10-CM

## 2024-08-14 DIAGNOSIS — M25.512 CHRONIC LEFT SHOULDER PAIN: Primary | ICD-10-CM

## 2024-08-14 PROCEDURE — 99214 OFFICE O/P EST MOD 30 MIN: CPT | Performed by: NURSE PRACTITIONER

## 2024-08-14 RX ORDER — DULOXETIN HYDROCHLORIDE 20 MG/1
20 CAPSULE, DELAYED RELEASE ORAL DAILY
Qty: 30 CAPSULE | Refills: 1 | Status: SHIPPED | OUTPATIENT
Start: 2024-08-14

## 2024-08-14 RX ORDER — ALBUTEROL SULFATE 90 UG/1
AEROSOL, METERED RESPIRATORY (INHALATION)
Qty: 6.7 G | Refills: 1 | Status: SHIPPED | OUTPATIENT
Start: 2024-08-14

## 2024-08-14 NOTE — PROGRESS NOTES
Subjective:      Patient ID: Adelso Cruz is a 59 y.o. male who presents today for:     Chief Complaint   Patient presents with    Shoulder Pain     Follow up, patient states his shoulder pain feels like its getting worse and now has starting burning       HPI pt in today to f/u on left shoulder pain. Chronic, has been worsening.  He has not seen Ortho yet.  He does plan to still see them    Patient reports that COPD has been stable.  He does use albuterol about twice daily.  He reports he does not do well with inhaled corticosteroids.    Patient also does admit that his mood varies.  He does have significant history of mental health and reports that he cannot find adequate treatment due to his history of drug abuse.  Denies any current suicidal ideations.    Past Medical History:   Diagnosis Date    Anxiety     Cancer (HCC)     skin  L arm    Cellulitis of heel, right 2016    after surgery x 3 on tendion     CHF (congestive heart failure) (HCC)     COPD (chronic obstructive pulmonary disease) (HCC)     Depression     Erectile dysfunction     Headache(784.0)     HIV (human immunodeficiency virus infection) (HCC)     Hyperlipidemia     Liver disease     Osteoarthritis      Past Surgical History:   Procedure Laterality Date    CHOLECYSTECTOMY      CYST REMOVAL      R wrist x2    FOOT SURGERY      bone extraction    FRACTURE SURGERY      UPPER GASTROINTESTINAL ENDOSCOPY N/A 1/16/2019    EGD ESOPHAGOGASTRODUODENOSCOPY performed by Karl Will MD at [facility]     Family History   Problem Relation Age of Onset    High Blood Pressure Mother     Cancer Father      Social History     Socioeconomic History    Marital status: Single     Spouse name: Not on file    Number of children: Not on file    Years of education: Not on file    Highest education level: Not on file   Occupational History    Not on file   Tobacco Use    Smoking status: Every Day     Current packs/day: 2.00     Average packs/day: 2.0

## 2024-08-21 ENCOUNTER — TELEPHONE (OUTPATIENT)
Dept: INFECTIOUS DISEASES | Age: 59
End: 2024-08-21

## 2024-08-21 ASSESSMENT — ENCOUNTER SYMPTOMS
CONSTIPATION: 0
DIARRHEA: 0
ABDOMINAL PAIN: 0
BACK PAIN: 1
WHEEZING: 0
COUGH: 0
SHORTNESS OF BREATH: 0

## 2024-08-21 NOTE — PROGRESS NOTES
[facility] SPECIALTY CARE, [facility] OTOLARYNGOLOGY  [address] [place] [address], [address]  [facility] [address]  Dept: [phone]  Dept Fax: [phone]  Loc: [phone]     8/22/2024    Visit type: New patient    Reason for Visit: New Patient (thyroid nodule/)       ASSESSMENT/PLAN   1. Thyroid nodule  -     US HEAD NECK SOFT TISSUE THYROID; Future  -     Thyroglobulin And Anti-Thyroglobulin Ab; Future  2. S/P partial thyroidectomy  -     US HEAD NECK SOFT TISSUE THYROID; Future  -     Thyroglobulin And Anti-Thyroglobulin Ab; Future  3. Dysphagia, unspecified type  -     FL ESOPHAGRAM; Future    The patient, with a history of partial thyroidectomy in 2017, presents with ongoing dysphagia, particularly with certain foods, and reports mucus buildup. The patient is a current smoker, consuming two packs of cigarettes per day, and has recently switched to smoking a pipe. Scope revealing pooling of secretions in vallecula and postcricoid space but no mases or lesions.  Due history of thyroid surgery,  a thyroid ultrasound is ordered as well as a swallowing test. The patient has been advised to quit smoking and will follow up after the diagnostic tests are completed.    No follow-ups on file.  No orders of the defined types were placed in this encounter.     Subjective    Patient: Adelso Cruz is a 59 y.o. male   HPI:    The patient presents with a history of left hemithyroidectomy in 2020 for a nodule. He has not had the remaining thyroid checked since the surgery. He reports ongoing swallowing issues, which have been present for years, even before the surgery. He experiences difficulty swallowing certain foods, such as hamburgers and spaghetti, which get caught in his throat. He has had a swallowing test before [facility] and has seen a speech therapist in the past. He denies any voice changes after the surgery but mentions that food particles come back up an hour after eating. He is a

## 2024-08-22 ENCOUNTER — OFFICE VISIT (OUTPATIENT)
Age: 59
End: 2024-08-22
Payer: COMMERCIAL

## 2024-08-22 VITALS
OXYGEN SATURATION: 98 % | WEIGHT: 167 LBS | TEMPERATURE: 98.1 F | HEART RATE: 65 BPM | RESPIRATION RATE: 14 BRPM | SYSTOLIC BLOOD PRESSURE: 110 MMHG | DIASTOLIC BLOOD PRESSURE: 70 MMHG | BODY MASS INDEX: 24.73 KG/M2 | HEIGHT: 69 IN

## 2024-08-22 DIAGNOSIS — E89.0 S/P PARTIAL THYROIDECTOMY: ICD-10-CM

## 2024-08-22 DIAGNOSIS — E04.1 THYROID NODULE: Primary | ICD-10-CM

## 2024-08-22 DIAGNOSIS — R13.10 DYSPHAGIA, UNSPECIFIED TYPE: ICD-10-CM

## 2024-08-22 PROCEDURE — 31575 DIAGNOSTIC LARYNGOSCOPY: CPT | Performed by: STUDENT IN AN ORGANIZED HEALTH CARE EDUCATION/TRAINING PROGRAM

## 2024-08-22 PROCEDURE — 99204 OFFICE O/P NEW MOD 45 MIN: CPT | Performed by: STUDENT IN AN ORGANIZED HEALTH CARE EDUCATION/TRAINING PROGRAM

## 2024-09-16 ENCOUNTER — TELEPHONE (OUTPATIENT)
Dept: FAMILY MEDICINE CLINIC | Age: 59
End: 2024-09-16

## 2024-09-19 ENCOUNTER — TELEPHONE (OUTPATIENT)
Dept: INFECTIOUS DISEASES | Age: 59
End: 2024-09-19

## 2024-09-19 NOTE — TELEPHONE ENCOUNTER
Call placed to pt as appt reminder. Unsure if pt has secured transportation. Labs done in July.     Phone continues to ring.

## 2024-09-25 ENCOUNTER — TELEPHONE (OUTPATIENT)
Dept: INFECTIOUS DISEASES | Age: 59
End: 2024-09-25

## 2024-10-14 ENCOUNTER — TELEMEDICINE (OUTPATIENT)
Dept: FAMILY MEDICINE CLINIC | Age: 59
End: 2024-10-14
Payer: COMMERCIAL

## 2024-10-14 DIAGNOSIS — Z00.00 WELCOME TO MEDICARE PREVENTIVE VISIT: Primary | ICD-10-CM

## 2024-10-14 DIAGNOSIS — F33.3 MDD (MAJOR DEPRESSIVE DISORDER), RECURRENT, SEVERE, WITH PSYCHOSIS (HCC): Chronic | ICD-10-CM

## 2024-10-14 PROCEDURE — G0402 INITIAL PREVENTIVE EXAM: HCPCS | Performed by: NURSE PRACTITIONER

## 2024-10-14 ASSESSMENT — LIFESTYLE VARIABLES
HOW MANY STANDARD DRINKS CONTAINING ALCOHOL DO YOU HAVE ON A TYPICAL DAY: 1 OR 2
HOW OFTEN DO YOU HAVE A DRINK CONTAINING ALCOHOL: 2-4 TIMES A MONTH

## 2024-10-14 ASSESSMENT — PATIENT HEALTH QUESTIONNAIRE - PHQ9
8. MOVING OR SPEAKING SO SLOWLY THAT OTHER PEOPLE COULD HAVE NOTICED. OR THE OPPOSITE, BEING SO FIGETY OR RESTLESS THAT YOU HAVE BEEN MOVING AROUND A LOT MORE THAN USUAL: SEVERAL DAYS
1. LITTLE INTEREST OR PLEASURE IN DOING THINGS: SEVERAL DAYS
9. THOUGHTS THAT YOU WOULD BE BETTER OFF DEAD, OR OF HURTING YOURSELF: SEVERAL DAYS
6. FEELING BAD ABOUT YOURSELF - OR THAT YOU ARE A FAILURE OR HAVE LET YOURSELF OR YOUR FAMILY DOWN: NEARLY EVERY DAY
SUM OF ALL RESPONSES TO PHQ QUESTIONS 1-9: 12
6. FEELING BAD ABOUT YOURSELF - OR THAT YOU ARE A FAILURE OR HAVE LET YOURSELF OR YOUR FAMILY DOWN: NEARLY EVERY DAY
8. MOVING OR SPEAKING SO SLOWLY THAT OTHER PEOPLE COULD HAVE NOTICED. OR THE OPPOSITE, BEING SO FIGETY OR RESTLESS THAT YOU HAVE BEEN MOVING AROUND A LOT MORE THAN USUAL: SEVERAL DAYS
2. FEELING DOWN, DEPRESSED OR HOPELESS: SEVERAL DAYS
3. TROUBLE FALLING OR STAYING ASLEEP: NEARLY EVERY DAY
SUM OF ALL RESPONSES TO PHQ QUESTIONS 1-9: 12
5. POOR APPETITE OR OVEREATING: SEVERAL DAYS
2. FEELING DOWN, DEPRESSED OR HOPELESS: SEVERAL DAYS
SUM OF ALL RESPONSES TO PHQ QUESTIONS 1-9: 13
3. TROUBLE FALLING OR STAYING ASLEEP: NEARLY EVERY DAY
SUM OF ALL RESPONSES TO PHQ QUESTIONS 1-9: 12
4. FEELING TIRED OR HAVING LITTLE ENERGY: SEVERAL DAYS
10. IF YOU CHECKED OFF ANY PROBLEMS, HOW DIFFICULT HAVE THESE PROBLEMS MADE IT FOR YOU TO DO YOUR WORK, TAKE CARE OF THINGS AT HOME, OR GET ALONG WITH OTHER PEOPLE: VERY DIFFICULT
SUM OF ALL RESPONSES TO PHQ QUESTIONS 1-9: 13
1. LITTLE INTEREST OR PLEASURE IN DOING THINGS: SEVERAL DAYS
7. TROUBLE CONCENTRATING ON THINGS, SUCH AS READING THE NEWSPAPER OR WATCHING TELEVISION: SEVERAL DAYS
SUM OF ALL RESPONSES TO PHQ QUESTIONS 1-9: 12
SUM OF ALL RESPONSES TO PHQ9 QUESTIONS 1 & 2: 2
SUM OF ALL RESPONSES TO PHQ QUESTIONS 1-9: 13
SUM OF ALL RESPONSES TO PHQ QUESTIONS 1-9: 12
4. FEELING TIRED OR HAVING LITTLE ENERGY: SEVERAL DAYS
7. TROUBLE CONCENTRATING ON THINGS, SUCH AS READING THE NEWSPAPER OR WATCHING TELEVISION: SEVERAL DAYS
5. POOR APPETITE OR OVEREATING: SEVERAL DAYS
SUM OF ALL RESPONSES TO PHQ9 QUESTIONS 1 & 2: 2
10. IF YOU CHECKED OFF ANY PROBLEMS, HOW DIFFICULT HAVE THESE PROBLEMS MADE IT FOR YOU TO DO YOUR WORK, TAKE CARE OF THINGS AT HOME, OR GET ALONG WITH OTHER PEOPLE: VERY DIFFICULT

## 2024-10-14 ASSESSMENT — COLUMBIA-SUICIDE SEVERITY RATING SCALE - C-SSRS
7. DID THIS OCCUR IN THE LAST THREE MONTHS: YES
1. WITHIN THE PAST MONTH, HAVE YOU WISHED YOU WERE DEAD OR WISHED YOU COULD GO TO SLEEP AND NOT WAKE UP?: YES
6. HAVE YOU EVER DONE ANYTHING, STARTED TO DO ANYTHING, OR PREPARED TO DO ANYTHING TO END YOUR LIFE?: YES
6. HAVE YOU EVER DONE ANYTHING, STARTED TO DO ANYTHING, OR PREPARED TO DO ANYTHING TO END YOUR LIFE?: YES
7. DID THIS OCCUR IN THE LAST THREE MONTHS: NO
4. HAVE YOU HAD THESE THOUGHTS AND HAD SOME INTENTION OF ACTING ON THEM?: NO
3. HAVE YOU BEEN THINKING ABOUT HOW YOU MIGHT KILL YOURSELF?: NO
1. WITHIN THE PAST MONTH, HAVE YOU WISHED YOU WERE DEAD OR WISHED YOU COULD GO TO SLEEP AND NOT WAKE UP?: YES
2. HAVE YOU ACTUALLY HAD ANY THOUGHTS OF KILLING YOURSELF?: YES

## 2024-10-14 NOTE — PATIENT INSTRUCTIONS
When should you call for help?   Call 911 if you have symptoms of a heart attack. These may include:    Chest pain or pressure, or a strange feeling in the chest.     Sweating.     Shortness of breath.     Pain, pressure, or a strange feeling in the back, neck, jaw, or upper belly or in one or both shoulders or arms.     Lightheadedness or sudden weakness.     A fast or irregular heartbeat.   After you call 911, the  may tell you to chew 1 adult-strength or 2 to 4 low-dose aspirin. Wait for an ambulance. Do not try to drive yourself.  Watch closely for changes in your health, and be sure to contact your doctor if you have any problems.  Where can you learn more?  Go to https://www.[employer]ogo[employer].net/patientEd and enter F075 to learn more about \"A Healthy Heart: Care Instructions.\"  Current as of: June 24, 2023  Content Version: 14.2  © 2024 [employer].   Care instructions adapted under license by [employer]. If you have questions about a medical condition or this instruction, always ask your healthcare professional. [employer] disclaims any warranty or liability for your use of this information.      Personalized Preventive Plan for Adelso Cruz - 10/14/2024  Medicare offers a range of preventive health benefits. Some of the tests and screenings are paid in full while other may be subject to a deductible, co-insurance, and/or copay.    Some of these benefits include a comprehensive review of your medical history including lifestyle, illnesses that may run in your family, and various assessments and screenings as appropriate.    After reviewing your medical record and screening and assessments performed today your provider may have ordered immunizations, labs, imaging, and/or referrals for you.  A list of these orders (if applicable) as well as your Preventive Care list are included within your After Visit Summary for your review.    Other Preventive Recommendations:    A 
Middle School

## 2024-10-14 NOTE — PROGRESS NOTES
this is a billable service, which includes applicable co-pays. This Virtual Visit was conducted with patient's (and/or legal guardian's) consent. Patient identification was verified, and a caregiver was present when appropriate.   The patient was located at Home: [address] [facility]. [address] [place] [id number]  Provider was located at Facility (Appt Dept): [address] [place] [address]  Confirm you are appropriately licensed, registered, or certified to deliver care in the state where the patient is located as indicated above. If you are not or unsure, please re-schedule the visit: Yes, I confirm.       Return in about 4 months (around 2/14/2025).    Reviewed with the patient: current clinicalstatus, medications, activities and diet.     Side effects, adverse effects of the medication prescribedtoday, as well as treatment plan/ rationale and result expectations have been discussedwith the patient who expresses understanding and desires to proceed.    Close follow upto evaluate treatment results and for coordination of care.  I have reviewedthe patient's medical history in detail and updated the computerized patient record.    Lesly Champagne, RHINA - CNP

## 2024-10-19 ENCOUNTER — APPOINTMENT (OUTPATIENT)
Dept: CT IMAGING | Age: 59
End: 2024-10-19
Payer: COMMERCIAL

## 2024-10-19 ENCOUNTER — HOSPITAL ENCOUNTER (EMERGENCY)
Age: 59
Discharge: HOME | End: 2024-10-19
Payer: COMMERCIAL

## 2024-10-19 ENCOUNTER — APPOINTMENT (OUTPATIENT)
Dept: GENERAL RADIOLOGY | Age: 59
End: 2024-10-19
Payer: COMMERCIAL

## 2024-10-19 VITALS
OXYGEN SATURATION: 97 % | TEMPERATURE: 97.6 F | HEART RATE: 72 BPM | RESPIRATION RATE: 20 BRPM | SYSTOLIC BLOOD PRESSURE: 115 MMHG | DIASTOLIC BLOOD PRESSURE: 80 MMHG

## 2024-10-19 DIAGNOSIS — R33.9 URINARY RETENTION: Primary | ICD-10-CM

## 2024-10-19 DIAGNOSIS — K59.00 CONSTIPATION, UNSPECIFIED CONSTIPATION TYPE: ICD-10-CM

## 2024-10-19 LAB
ALBUMIN SERPL-MCNC: 3.9 G/DL (ref 3.5–4.6)
ALP SERPL-CCNC: 104 U/L (ref 35–104)
ALT SERPL-CCNC: 13 U/L (ref 0–41)
AMPHET UR QL SCN: POSITIVE
ANION GAP SERPL CALCULATED.3IONS-SCNC: 10 MEQ/L (ref 9–15)
AST SERPL-CCNC: 15 U/L (ref 0–40)
BACTERIA URNS QL MICRO: NEGATIVE /HPF
BARBITURATES UR QL SCN: ABNORMAL
BASE EXCESS ARTERIAL: 3 (ref -3–3)
BASOPHILS # BLD: 0 K/UL (ref 0–0.2)
BASOPHILS NFR BLD: 0.2 %
BENZODIAZ UR QL SCN: ABNORMAL
BILIRUB SERPL-MCNC: 0.7 MG/DL (ref 0.2–0.7)
BILIRUB UR QL STRIP: NEGATIVE
BUN SERPL-MCNC: 15 MG/DL (ref 6–20)
CALCIUM IONIZED: 1.31 MMOL/L (ref 1.12–1.32)
CALCIUM SERPL-MCNC: 9.4 MG/DL (ref 8.5–9.9)
CANNABINOIDS UR QL SCN: POSITIVE
CHLORIDE SERPL-SCNC: 102 MEQ/L (ref 95–107)
CLARITY UR: CLEAR
CO2 SERPL-SCNC: 24 MEQ/L (ref 20–31)
COCAINE UR QL SCN: POSITIVE
COLOR UR: YELLOW
CREAT SERPL-MCNC: 0.91 MG/DL (ref 0.7–1.2)
DRUG SCREEN COMMENT UR-IMP: ABNORMAL
EKG ATRIAL RATE: 68 BPM
EKG P AXIS: 60 DEGREES
EKG P-R INTERVAL: 140 MS
EKG Q-T INTERVAL: 396 MS
EKG QRS DURATION: 84 MS
EKG QTC CALCULATION (BAZETT): 421 MS
EKG R AXIS: 67 DEGREES
EKG T AXIS: 64 DEGREES
EKG VENTRICULAR RATE: 68 BPM
EOSINOPHIL # BLD: 0.1 K/UL (ref 0–0.7)
EOSINOPHIL NFR BLD: 0.8 %
EPI CELLS #/AREA URNS AUTO: ABNORMAL /HPF (ref 0–5)
ERYTHROCYTE [DISTWIDTH] IN BLOOD BY AUTOMATED COUNT: 13.3 % (ref 11.5–14.5)
ETHANOL PERCENT: NORMAL G/DL
ETHANOLAMINE SERPL-MCNC: <10 MG/DL (ref 0–0.08)
FENTANYL SCREEN, URINE: ABNORMAL
GLOBULIN SER CALC-MCNC: 3.7 G/DL (ref 2.3–3.5)
GLUCOSE BLD-MCNC: 108 MG/DL (ref 70–99)
GLUCOSE SERPL-MCNC: 90 MG/DL (ref 70–99)
GLUCOSE UR STRIP-MCNC: NEGATIVE MG/DL
HCO3 ARTERIAL: 27.9 MMOL/L (ref 21–29)
HCT VFR BLD AUTO: 45 % (ref 41–53)
HCT VFR BLD AUTO: 48.6 % (ref 42–52)
HGB BLD CALC-MCNC: 15.2 GM/DL (ref 13.5–17.5)
HGB BLD-MCNC: 16.1 G/DL (ref 14–18)
HGB UR QL STRIP: ABNORMAL
HYALINE CASTS #/AREA URNS AUTO: ABNORMAL /HPF (ref 0–5)
KETONES UR STRIP-MCNC: NEGATIVE MG/DL
LACTATE BLDV-SCNC: 1.2 MMOL/L (ref 0.5–2.2)
LACTATE: 0.58 MMOL/L (ref 0.4–2)
LEUKOCYTE ESTERASE UR QL STRIP: NEGATIVE
LIPASE SERPL-CCNC: 52 U/L (ref 12–95)
LYMPHOCYTES # BLD: 1.9 K/UL (ref 1–4.8)
LYMPHOCYTES NFR BLD: 21 %
MAGNESIUM SERPL-MCNC: 2.2 MG/DL (ref 1.7–2.4)
MCH RBC QN AUTO: 30.1 PG (ref 27–31.3)
MCHC RBC AUTO-ENTMCNC: 33.1 % (ref 33–37)
MCV RBC AUTO: 90.8 FL (ref 79–92.2)
METHADONE UR QL SCN: ABNORMAL
MONOCYTES # BLD: 0.7 K/UL (ref 0.2–0.8)
MONOCYTES NFR BLD: 7.5 %
NEUTROPHILS # BLD: 6.2 K/UL (ref 1.4–6.5)
NEUTS SEG NFR BLD: 70.2 %
NITRITE UR QL STRIP: NEGATIVE
O2 SAT, ARTERIAL: 92 % (ref 93–100)
OPIATES UR QL SCN: ABNORMAL
OXYCODONE UR QL SCN: ABNORMAL
PCO2 ARTERIAL: 45 MM HG (ref 35–45)
PCP UR QL SCN: ABNORMAL
PERFORMED ON: ABNORMAL
PH ARTERIAL: 7.4 (ref 7.35–7.45)
PH UR STRIP: 6 [PH] (ref 5–9)
PLATELET # BLD AUTO: 189 K/UL (ref 130–400)
PO2 ARTERIAL: 64 MM HG (ref 75–108)
POC CHLORIDE: 103 MEQ/L (ref 99–110)
POC CREATININE WHOLE BLOOD: 1.2
POC CREATININE: 0.9 MG/DL (ref 0.8–1.3)
POC SAMPLE TYPE: ABNORMAL
POTASSIUM SERPL-SCNC: 3.7 MEQ/L (ref 3.5–5.1)
POTASSIUM SERPL-SCNC: 4.2 MEQ/L (ref 3.4–4.9)
PROCALCITONIN SERPL IA-MCNC: 0.05 NG/ML (ref 0–0.15)
PROPOXYPH UR QL SCN: ABNORMAL
PROT SERPL-MCNC: 7.6 G/DL (ref 6.3–8)
PROT UR STRIP-MCNC: NEGATIVE MG/DL
RBC # BLD AUTO: 5.35 M/UL (ref 4.7–6.1)
RBC #/AREA URNS AUTO: ABNORMAL /HPF (ref 0–5)
SODIUM BLD-SCNC: 141 MEQ/L (ref 136–145)
SODIUM SERPL-SCNC: 136 MEQ/L (ref 135–144)
SP GR UR STRIP: 1.02 (ref 1–1.03)
TCO2 ARTERIAL: 29 MMOL/L (ref 21–32)
TROPONIN, HIGH SENSITIVITY: 8 NG/L (ref 0–19)
URINE REFLEX TO CULTURE: ABNORMAL
UROBILINOGEN UR STRIP-ACNC: 0.2 E.U./DL
WBC # BLD AUTO: 8.8 K/UL (ref 4.8–10.8)
WBC #/AREA URNS AUTO: ABNORMAL /HPF (ref 0–5)

## 2024-10-19 PROCEDURE — 84132 ASSAY OF SERUM POTASSIUM: CPT

## 2024-10-19 PROCEDURE — 84484 ASSAY OF TROPONIN QUANT: CPT

## 2024-10-19 PROCEDURE — 82435 ASSAY OF BLOOD CHLORIDE: CPT

## 2024-10-19 PROCEDURE — 6360000004 HC RX CONTRAST MEDICATION

## 2024-10-19 PROCEDURE — 96374 THER/PROPH/DIAG INJ IV PUSH: CPT

## 2024-10-19 PROCEDURE — 80307 DRUG TEST PRSMV CHEM ANLYZR: CPT

## 2024-10-19 PROCEDURE — 36600 WITHDRAWAL OF ARTERIAL BLOOD: CPT

## 2024-10-19 PROCEDURE — 99285 EMERGENCY DEPT VISIT HI MDM: CPT

## 2024-10-19 PROCEDURE — 82565 ASSAY OF CREATININE: CPT

## 2024-10-19 PROCEDURE — 51702 INSERT TEMP BLADDER CATH: CPT

## 2024-10-19 PROCEDURE — 82077 ASSAY SPEC XCP UR&BREATH IA: CPT

## 2024-10-19 PROCEDURE — 83690 ASSAY OF LIPASE: CPT

## 2024-10-19 PROCEDURE — 81001 URINALYSIS AUTO W/SCOPE: CPT

## 2024-10-19 PROCEDURE — 80053 COMPREHEN METABOLIC PANEL: CPT

## 2024-10-19 PROCEDURE — 83735 ASSAY OF MAGNESIUM: CPT

## 2024-10-19 PROCEDURE — 83605 ASSAY OF LACTIC ACID: CPT

## 2024-10-19 PROCEDURE — 84145 PROCALCITONIN (PCT): CPT

## 2024-10-19 PROCEDURE — 82330 ASSAY OF CALCIUM: CPT

## 2024-10-19 PROCEDURE — 6360000002 HC RX W HCPCS

## 2024-10-19 PROCEDURE — 93005 ELECTROCARDIOGRAM TRACING: CPT

## 2024-10-19 PROCEDURE — 85025 COMPLETE CBC W/AUTO DIFF WBC: CPT

## 2024-10-19 PROCEDURE — 82803 BLOOD GASES ANY COMBINATION: CPT

## 2024-10-19 PROCEDURE — 51798 US URINE CAPACITY MEASURE: CPT

## 2024-10-19 PROCEDURE — 74177 CT ABD & PELVIS W/CONTRAST: CPT

## 2024-10-19 PROCEDURE — 71275 CT ANGIOGRAPHY CHEST: CPT

## 2024-10-19 PROCEDURE — 96375 TX/PRO/DX INJ NEW DRUG ADDON: CPT

## 2024-10-19 PROCEDURE — 84295 ASSAY OF SERUM SODIUM: CPT

## 2024-10-19 PROCEDURE — 85014 HEMATOCRIT: CPT

## 2024-10-19 RX ORDER — NALOXONE HYDROCHLORIDE 1 MG/ML
1 INJECTION INTRAMUSCULAR; INTRAVENOUS; SUBCUTANEOUS ONCE
Status: COMPLETED | OUTPATIENT
Start: 2024-10-19 | End: 2024-10-19

## 2024-10-19 RX ORDER — NALOXONE HYDROCHLORIDE 0.4 MG/ML
0.4 INJECTION, SOLUTION INTRAMUSCULAR; INTRAVENOUS; SUBCUTANEOUS ONCE
Status: COMPLETED | OUTPATIENT
Start: 2024-10-19 | End: 2024-10-19

## 2024-10-19 RX ORDER — KETOROLAC TROMETHAMINE 15 MG/ML
15 INJECTION, SOLUTION INTRAMUSCULAR; INTRAVENOUS ONCE
Status: COMPLETED | OUTPATIENT
Start: 2024-10-19 | End: 2024-10-19

## 2024-10-19 RX ORDER — IOPAMIDOL 755 MG/ML
75 INJECTION, SOLUTION INTRAVASCULAR
Status: COMPLETED | OUTPATIENT
Start: 2024-10-19 | End: 2024-10-19

## 2024-10-19 RX ADMIN — NALXONE HYDROCHLORIDE 0.4 MG: 0.4 INJECTION INTRAMUSCULAR; INTRAVENOUS; SUBCUTANEOUS at 19:21

## 2024-10-19 RX ADMIN — NALOXONE HYDROCHLORIDE 1 MG: 1 INJECTION PARENTERAL at 19:32

## 2024-10-19 RX ADMIN — IOPAMIDOL 75 ML: 755 INJECTION, SOLUTION INTRAVENOUS at 19:56

## 2024-10-19 RX ADMIN — KETOROLAC TROMETHAMINE 15 MG: 15 INJECTION, SOLUTION INTRAMUSCULAR; INTRAVENOUS at 19:00

## 2024-10-19 ASSESSMENT — PAIN DESCRIPTION - LOCATION: LOCATION: ABDOMEN

## 2024-10-19 ASSESSMENT — ENCOUNTER SYMPTOMS
ABDOMINAL PAIN: 1
NAUSEA: 0
CONSTIPATION: 1
VOMITING: 0
COUGH: 0
PHOTOPHOBIA: 0
SHORTNESS OF BREATH: 0
DIARRHEA: 0

## 2024-10-19 ASSESSMENT — PAIN SCALES - GENERAL: PAINLEVEL_OUTOF10: 0

## 2024-10-19 ASSESSMENT — PAIN - FUNCTIONAL ASSESSMENT: PAIN_FUNCTIONAL_ASSESSMENT: NONE - DENIES PAIN

## 2024-10-19 NOTE — ED TRIAGE NOTES
Patient to ED due to abdominal pain that started this morning. Patient states he has been having difficulty urinating. Patient is alert and oriented x4. Resp are regular and equal.

## 2024-10-19 NOTE — ED PROVIDER NOTES
[facility] ED  EMERGENCY DEPARTMENT ENCOUNTER      Pt Name: Adelso Cruz  MRN: [id number]  Birthdate 1965  Date of evaluation: 10/19/2024  Provider: NAVNEET Hernandez  6:19 PM EDT      CHIEF COMPLAINT       Chief Complaint   Patient presents with    Abdominal Pain     Abdominal pain starting this morning         HISTORY OF PRESENT ILLNESS   (Location/Symptom, Timing/Onset, Context/Setting, Quality, Duration, Modifying Factors, Severity)  Note limiting factors.   Adelso Cruz is a 59 y.o. male who presents to the emergency department with PMHx CKD, fibromyalgia, tobacco use, anxiety disorder, schizophrenia, cocaine abuse, marijuana abuse, COPD, depression, hyperlipidemia, migraines.  Patient presents to the ED for evaluation of urinary retention.  Patient states that he last urinated earlier this morning.  He is not sure what time.  States he has also been constipated.  He cannot recall when his last bowel movement was.  Patient is having lower abdominal pain primarily to the suprapubic area.  Patient denies any nausea, vomiting or flank pain, fever or chills, diarrhea, SOB, CP, chest tightness, hematuria, dysuria, urinary urgency or frequency. Pt states he has had a cholecystectomy, no other abd surgeries.       HPI    Nursing Notes were reviewed.    REVIEW OF SYSTEMS    (2-9 systems for level 4, 10 or more for level 5)     Review of Systems   Constitutional:  Negative for chills and fever.   HENT:  Negative for congestion.    Eyes:  Negative for photophobia.   Respiratory:  Negative for cough and shortness of breath.    Cardiovascular:  Negative for chest pain.   Gastrointestinal:  Positive for abdominal pain and constipation. Negative for diarrhea, nausea and vomiting.   Genitourinary:  Positive for decreased urine volume. Negative for difficulty urinating.   Musculoskeletal:  Negative for myalgias.   Neurological:  Negative for headaches.   Psychiatric/Behavioral:  Negative for  ordered. Decision-making details documented in ED Course.  Radiology: ordered. Decision-making details documented in ED Course.  ECG/medicine tests: ordered.    Risk  Prescription drug management.      59-year-old male patient presents to the ED for evaluation of urinary retention and constipation.  States he last urinated earlier today.  Unsure when his last bowel movement is.  Patient states significant pain to his lower abdomen, which is what prompted the EMS call.  Patient comes in with a blood pressure 117/79.  Heart rate 64.  Afebrile.  Respirations 20.  98% on room air.  Patient given IV Toradol.  States some improvement.  Bladder scan done at bedside on arrival demonstrates greater than 800 cc retained.  Lindsay catheter placed without difficulty and patient reports immediate normal complete relief of his symptoms at that time.  Of note while patient is in the ED he does have oxygen desaturations while he is asleep.  He is easily awoken to voice and his oxygen saturations improve to normal each time.  I would recommend that we place him on nasal cannula but he is refusing.  ABG was ordered to evaluate further, ABG performed on room air shows pO2 slightly low at 64, O2 sat is 92%.  HCO3 27.9.  pCO2 45.  pH 7.404.    Other workup shows no evidence of significant UTI.  There is blood in the urine.  This after Lindsay catheter placement.  May be traumatic.  There is no leukocytosis.  No lactic acidosis.  Procalcitonin is low.  UDS is positive for amphetamines, cannabis, cocaine which patient denies.  There is no MIKAYLA.  CT abdomen shows no acute findings in the abdomen or pelvis, does show circumferential bladder wall thickening with mild adjacent formation which may relate to cystitis, chronic outflow obstruction in the setting of prostatomegaly, or Lindsay catheterization.  As well as large stool burden in the proximal colon.  No signs of obstruction.  CTA chest was added on given patient's episodic periods of hypoxia

## 2024-10-20 LAB
PERFORMED ON: NORMAL
POC CREATININE: 1.2 MG/DL (ref 0.8–1.3)
POC SAMPLE TYPE: NORMAL

## 2024-10-20 NOTE — ED NOTES
DC instructions explained and reviewed. Pt demonstrates understanding. No outward signs of acute distress noted. Patient ambulatory on discharge.

## 2024-10-21 ENCOUNTER — TELEPHONE (OUTPATIENT)
Dept: INFECTIOUS DISEASES | Age: 59
End: 2024-10-21

## 2024-10-21 LAB
EKG ATRIAL RATE: 68 BPM
EKG P AXIS: 60 DEGREES
EKG P-R INTERVAL: 140 MS
EKG Q-T INTERVAL: 396 MS
EKG QRS DURATION: 84 MS
EKG QTC CALCULATION (BAZETT): 421 MS
EKG R AXIS: 67 DEGREES
EKG T AXIS: 64 DEGREES
EKG VENTRICULAR RATE: 68 BPM

## 2024-10-21 PROCEDURE — 93010 ELECTROCARDIOGRAM REPORT: CPT | Performed by: INTERNAL MEDICINE

## 2024-10-21 NOTE — TELEPHONE ENCOUNTER
Pt called in needing help with transport for upcoming appt.   Temp phone number change [phone] (sisters phone)    change for upcoming Dr Post apt.   10.23.24 pt will be picked up at   [address] [place] [phone] (his sisters)     Rn and pt reviewed additional transport options and assistance. It is determined he will use [facility] as last report for help.   Discussed transport policy. Discussed need to call 2 hours prior to apt time for cancellation. Pt verbalized all understanding.   [facility] notified. Pt aware transport will be there for  about 30 mins prior to apt.     All appts are related to medical treatment and are necessary for compliance  This transportation assistance will help patient remain in medical care by enabling them to access medical and support services.    RN scheduled transportation per pt request,     location and  time: 930  Residency:   [address] [place] [id number]  [phone]      Appt Date, Location and apt time:   11.5.24 at 10 -- Dr Godinez  [address]     Round trip    Denies any needs at this time. Denies any issues that need Doctor attention. Will call with concerns

## 2024-10-22 ENCOUNTER — LAB (OUTPATIENT)
Dept: UROLOGY | Age: 59
End: 2024-10-22

## 2024-10-22 DIAGNOSIS — N32.89 BLADDER SPASM: Primary | ICD-10-CM

## 2024-10-22 RX ORDER — OXYBUTYNIN CHLORIDE 10 MG/1
10 TABLET, EXTENDED RELEASE ORAL DAILY
Qty: 30 TABLET | Refills: 3 | Status: SHIPPED | OUTPATIENT
Start: 2024-10-22

## 2024-10-23 ENCOUNTER — LAB (OUTPATIENT)
Dept: INFECTIOUS DISEASES | Age: 59
End: 2024-10-23

## 2024-10-23 ENCOUNTER — OFFICE VISIT (OUTPATIENT)
Dept: INFECTIOUS DISEASES | Age: 59
End: 2024-10-23
Payer: COMMERCIAL

## 2024-10-23 VITALS
TEMPERATURE: 98.1 F | WEIGHT: 158.8 LBS | SYSTOLIC BLOOD PRESSURE: 116 MMHG | BODY MASS INDEX: 23.45 KG/M2 | HEART RATE: 85 BPM | DIASTOLIC BLOOD PRESSURE: 85 MMHG

## 2024-10-23 DIAGNOSIS — Z21 HIV INFECTION, UNSPECIFIED SYMPTOM STATUS (HCC): Primary | ICD-10-CM

## 2024-10-23 DIAGNOSIS — R33.8 ACUTE URINARY RETENTION: ICD-10-CM

## 2024-10-23 DIAGNOSIS — Z91.148 NONCOMPLIANCE WITH MEDICATION REGIMEN: ICD-10-CM

## 2024-10-23 DIAGNOSIS — Z72.0 NICOTINE ABUSE: ICD-10-CM

## 2024-10-23 DIAGNOSIS — F19.10 DRUG ABUSE (HCC): ICD-10-CM

## 2024-10-23 PROCEDURE — 99214 OFFICE O/P EST MOD 30 MIN: CPT | Performed by: INTERNAL MEDICINE

## 2024-10-23 ASSESSMENT — PATIENT HEALTH QUESTIONNAIRE - PHQ9
SUM OF ALL RESPONSES TO PHQ QUESTIONS 1-9: 1
2. FEELING DOWN, DEPRESSED OR HOPELESS: SEVERAL DAYS
SUM OF ALL RESPONSES TO PHQ QUESTIONS 1-9: 1
SUM OF ALL RESPONSES TO PHQ QUESTIONS 1-9: 1
1. LITTLE INTEREST OR PLEASURE IN DOING THINGS: NOT AT ALL
SUM OF ALL RESPONSES TO PHQ9 QUESTIONS 1 & 2: 1
SUM OF ALL RESPONSES TO PHQ QUESTIONS 1-9: 1

## 2024-10-23 NOTE — PROGRESS NOTES
Adelso Cruz (:  1965) is a 59 y.o. male,Established patient, here for evaluation of the following chief complaint(s):  No chief complaint on file.        ASSESSMENT/PLAN:  HIV infection with unknown status  Acute urine retention status post Lindsay catheter insertion in the ER  Non compliance with meds.   Drug abuse  Nicotine dependence and abuse in a patient with COPD  Skin cancer, recurrent  MDD, recurrent and severe     Overemphasize the importance to take his Ditropan  Reinforce 100% compliance with HIV.  Resume Triumeq  Follow-up HIV viral load and CD4 CBC complete metabolic profile in 4 weeks  Follow-up in 6 weeks  May switch patient to Cabenuva if he becomes undetectable since he has issues with compliance with oral medications  Colonoscopy and cologard, refused   F/U with urology for urinary retention  Advise smoking cessation and drug abstinence     Subjective   SUBJECTIVE/OBJECTIVE:  HPI  Follow-up HIV, supposed to be on Triumeq, currently not taking it for several months.   Last viral load in  was undetectable.   Currently is not taking any medications.    Was in the emergency room with acute urinary retention, currently with a Lindsay catheter.  Reported that he was hypoxic but refused to use oxygen.  Was prescribed Ditropan that he has not started yet.  Frequent mental health facility for H/O suicide, hallucination  Right foot chronic nonhealing ulcer.  Supposed to be on chronic Bactrim that he takes on a as needed  Noncompliant with medications  History of crack cocaine and meth abuse  No alcohol abuse  Smokes cigarettes 2 packs daily.  Refuses to wear the oxygen  Has a motorized chair that he uses on occasion  Has a puppy that he is training and is helping with his mental condition  Refuses psych meds       Review of Systems   Respiratory: Denies any current shortness of breath  Gastrointestinal: Denies any current issues  Currently with a Lindsay catheter  Musculoskeletal:  Positive for

## 2024-10-23 NOTE — PROGRESS NOTES
Routine apt today.   Staying with sister for now  Not taking ART  Needs MH follow up  Smoking 1 PPD again  + drug use -- meth and THC  No health complaints         Of note: discussed cabenuva with pt. He is willing to take triumeq and is interested in Cab injections.   Labs 4 weeks after med restart   8 week apt scheduled as well.       Out pt medication list reviewed   Bactrim, Imitrex and asa   Not taking ART    Reviewed lab work         Annual RN     ART currently: Triumeq  Compliant with medications --- no  All supplentments dicussed and educatioon providied pt keeps all sepatre from ART    Barriers to HIV VL suppression and compliance--- Mental Health  Viral Load --  7.18.24 -- 66 (was ta         Current partner --  no  Pt is aware of U=U and verbalized understanding.     Support system: sister  Monthly support group -- aware     Smoking?  back up to 1 PPD- trying to quit  Encouraged and discussed importance.      Etoh? denies    Drug use -- meth and THC   Encouraged to quit,       Dental-- Encouraged --   When: [facility]  [facility]     [facility] -- Encouraged -- last year - [facility]nl    Mental Health -- needs. History of multiple MH dx  Hosp recently  Denies S/H   Refuses MH meds  States he has been doing well the past 2 weeks though      Health Maintenance needs.   Colonoscopy-----  2011 -- refuses to do again  Depression monitoring--- neg today       Reviewed Vaccines:   Flu vaccine discussed. Refuses  Pneumonia vacc? -- completed- 2021     Hep B vaccine/screening --   Vaccinated: yes  Screened: yes   Immune?:  no  Recheck Hep B antibiody          HIV primary care-- hospital out pt  Housing-- stable  HIV risk counseling--- yes  Mental health-- yes  Substance abuse--- yes      Safe sex practices are still encouraged to prevent transmission of other STI's, such as, chlamydia, herpes, gonorrhea, and syphilis.
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
## 2024-10-25 DIAGNOSIS — K21.9 GASTROESOPHAGEAL REFLUX DISEASE, UNSPECIFIED WHETHER ESOPHAGITIS PRESENT: ICD-10-CM

## 2024-10-25 RX ORDER — PANTOPRAZOLE SODIUM 40 MG/1
40 TABLET, DELAYED RELEASE ORAL DAILY
Qty: 90 TABLET | Refills: 1 | Status: SHIPPED | OUTPATIENT
Start: 2024-10-25

## 2024-10-25 NOTE — TELEPHONE ENCOUNTER
Rx requested:  Requested Prescriptions     Pending Prescriptions Disp Refills    pantoprazole (PROTONIX) 40 MG tablet [Pharmacy Med Name: PANTOPRAZOLE 40MG TABLETS] 90 tablet 1     Sig: TAKE 1 TABLET BY MOUTH DAILY         Last Office Visit:   10/14/2024      Next Visit Date:  Future Appointments   Date Time Provider Department Center   11/5/2024  9:15 AM Cedric Vargas PA [facility]   12/17/2024 10:00 AM Angie Post MD [facility]

## 2024-11-08 DIAGNOSIS — Z21 HIV INFECTION, UNSPECIFIED SYMPTOM STATUS (HCC): Primary | ICD-10-CM

## 2024-11-08 DIAGNOSIS — Z21 ASYMPTOMATIC HUMAN IMMUNODEFICIENCY VIRUS (HIV) INFECTION STATUS: Primary | ICD-10-CM

## 2024-12-02 ENCOUNTER — TELEPHONE (OUTPATIENT)
Dept: INFECTIOUS DISEASES | Age: 59
End: 2024-12-02

## 2024-12-02 NOTE — TELEPHONE ENCOUNTER
Pt called in to office asking to cancel apt and transport.   Has not been taking medication. He had wanted to switch to Cabenuva.  Wanted to take a break.       Apt rescheduled. Transport cx.   Pt will re start his medication soon. Pt aware of importance of compliance.

## 2024-12-17 RX ORDER — SULFAMETHOXAZOLE AND TRIMETHOPRIM 800; 160 MG/1; MG/1
1 TABLET ORAL
Qty: 12 TABLET | Refills: 1 | OUTPATIENT
Start: 2024-12-18

## 2024-12-17 NOTE — TELEPHONE ENCOUNTER
Patient requesting medication refill. Please approve or deny this request.    Rx requested:  Requested Prescriptions     Pending Prescriptions Disp Refills    sulfamethoxazole-trimethoprim (BACTRIM DS;SEPTRA DS) 800-160 MG per tablet 12 tablet 1     Sig: Take 1 tablet by mouth three times a week         Last Office Visit:   10/14/2024      Next Visit Date:  Future Appointments   Date Time Provider Department Center   1/22/2025 11:00 AM Angie Post MD [facility]

## 2025-01-22 DIAGNOSIS — Z21 HIV INFECTION, UNSPECIFIED SYMPTOM STATUS (HCC): Primary | ICD-10-CM

## 2025-01-31 DIAGNOSIS — L97.519 NON-HEALING ULCER OF RIGHT FOOT, UNSPECIFIED ULCER STAGE (HCC): Primary | ICD-10-CM

## 2025-01-31 RX ORDER — SULFAMETHOXAZOLE AND TRIMETHOPRIM 800; 160 MG/1; MG/1
1 TABLET ORAL
Qty: 12 TABLET | Refills: 1 | Status: SHIPPED | OUTPATIENT
Start: 2025-01-31

## 2025-03-11 NOTE — TELEPHONE ENCOUNTER
Pt called in requesting to review lab work. Reviewed all resulted labs. VL pending. Pt aware drug screen is + for cocaine. Denies questions regarding lab work.    Will call office if needed Unit RN to OR RN

## 2025-03-16 ENCOUNTER — HOSPITAL ENCOUNTER (EMERGENCY)
Facility: HOSPITAL | Age: 60
Discharge: HOME | End: 2025-03-17
Payer: COMMERCIAL

## 2025-03-16 VITALS
HEIGHT: 69 IN | HEART RATE: 61 BPM | DIASTOLIC BLOOD PRESSURE: 84 MMHG | TEMPERATURE: 97.2 F | OXYGEN SATURATION: 98 % | RESPIRATION RATE: 16 BRPM | WEIGHT: 169 LBS | SYSTOLIC BLOOD PRESSURE: 124 MMHG | BODY MASS INDEX: 25.03 KG/M2

## 2025-03-16 DIAGNOSIS — T25.211A: Primary | ICD-10-CM

## 2025-03-16 DIAGNOSIS — T25.221A PARTIAL THICKNESS BURN OF RIGHT FOOT, INITIAL ENCOUNTER: ICD-10-CM

## 2025-03-16 PROCEDURE — 2500000004 HC RX 250 GENERAL PHARMACY W/ HCPCS (ALT 636 FOR OP/ED): Performed by: PHYSICIAN ASSISTANT

## 2025-03-16 PROCEDURE — 2500000005 HC RX 250 GENERAL PHARMACY W/O HCPCS: Performed by: PHYSICIAN ASSISTANT

## 2025-03-16 PROCEDURE — 96360 HYDRATION IV INFUSION INIT: CPT | Performed by: PHYSICIAN ASSISTANT

## 2025-03-16 PROCEDURE — 99284 EMERGENCY DEPT VISIT MOD MDM: CPT | Mod: 25

## 2025-03-16 RX ORDER — BACITRACIN ZINC 500 UNIT/G
1 OINTMENT (GRAM) TOPICAL 2 TIMES DAILY
Qty: 113 G | Refills: 0 | Status: SHIPPED | OUTPATIENT
Start: 2025-03-16 | End: 2025-03-26

## 2025-03-16 RX ORDER — IBUPROFEN 600 MG/1
600 TABLET ORAL EVERY 6 HOURS PRN
Qty: 28 TABLET | Refills: 0 | Status: SHIPPED | OUTPATIENT
Start: 2025-03-16 | End: 2025-03-23

## 2025-03-16 RX ORDER — BACITRACIN 500 [USP'U]/G
OINTMENT TOPICAL ONCE
Status: COMPLETED | OUTPATIENT
Start: 2025-03-16 | End: 2025-03-16

## 2025-03-16 RX ADMIN — BACITRACIN: 500 OINTMENT TOPICAL at 23:25

## 2025-03-16 RX ADMIN — SODIUM CHLORIDE 1000 ML: 9 INJECTION, SOLUTION INTRAVENOUS at 22:42

## 2025-03-16 ASSESSMENT — COLUMBIA-SUICIDE SEVERITY RATING SCALE - C-SSRS
1. IN THE PAST MONTH, HAVE YOU WISHED YOU WERE DEAD OR WISHED YOU COULD GO TO SLEEP AND NOT WAKE UP?: NO
2. HAVE YOU ACTUALLY HAD ANY THOUGHTS OF KILLING YOURSELF?: NO
6. HAVE YOU EVER DONE ANYTHING, STARTED TO DO ANYTHING, OR PREPARED TO DO ANYTHING TO END YOUR LIFE?: NO

## 2025-03-16 ASSESSMENT — PAIN DESCRIPTION - LOCATION: LOCATION: FOOT

## 2025-03-16 ASSESSMENT — PAIN SCALES - GENERAL: PAINLEVEL_OUTOF10: 10 - WORST POSSIBLE PAIN

## 2025-03-16 ASSESSMENT — LIFESTYLE VARIABLES
TOTAL SCORE: 0
HAVE PEOPLE ANNOYED YOU BY CRITICIZING YOUR DRINKING: NO
EVER FELT BAD OR GUILTY ABOUT YOUR DRINKING: NO
HAVE YOU EVER FELT YOU SHOULD CUT DOWN ON YOUR DRINKING: NO
EVER HAD A DRINK FIRST THING IN THE MORNING TO STEADY YOUR NERVES TO GET RID OF A HANGOVER: NO

## 2025-03-16 ASSESSMENT — PAIN - FUNCTIONAL ASSESSMENT: PAIN_FUNCTIONAL_ASSESSMENT: 0-10

## 2025-03-16 ASSESSMENT — PAIN DESCRIPTION - PAIN TYPE: TYPE: ACUTE PAIN

## 2025-03-16 ASSESSMENT — PAIN DESCRIPTION - ORIENTATION: ORIENTATION: LEFT

## 2025-03-17 ENCOUNTER — TELEPHONE (OUTPATIENT)
Dept: INFECTIOUS DISEASES | Age: 60
End: 2025-03-17
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
## 2025-03-21 ENCOUNTER — OFFICE VISIT (OUTPATIENT)
Dept: WOUND CARE | Facility: CLINIC | Age: 60
End: 2025-03-21
Payer: COMMERCIAL

## 2025-03-21 DIAGNOSIS — T25.211A: ICD-10-CM

## 2025-03-21 DIAGNOSIS — T25.221A PARTIAL THICKNESS BURN OF RIGHT FOOT, INITIAL ENCOUNTER: ICD-10-CM

## 2025-03-21 PROCEDURE — 99213 OFFICE O/P EST LOW 20 MIN: CPT | Mod: 25

## 2025-03-21 PROCEDURE — 16020 DRESS/DEBRID P-THICK BURN S: CPT | Mod: CCI

## 2025-03-21 PROCEDURE — 16030 DRESS/DEBRID P-THICK BURN L: CPT

## 2025-03-27 ENCOUNTER — PREP FOR PROCEDURE (OUTPATIENT)
Dept: SURGERY | Facility: HOSPITAL | Age: 60
End: 2025-03-27

## 2025-04-11 ENCOUNTER — OFFICE VISIT (OUTPATIENT)
Dept: WOUND CARE | Facility: CLINIC | Age: 60
End: 2025-04-11
Payer: COMMERCIAL

## 2025-04-11 PROCEDURE — 11042 DBRDMT SUBQ TIS 1ST 20SQCM/<: CPT

## 2025-04-18 ENCOUNTER — OFFICE VISIT (OUTPATIENT)
Dept: WOUND CARE | Facility: CLINIC | Age: 60
End: 2025-04-18
Payer: COMMERCIAL

## 2025-04-18 PROCEDURE — 99214 OFFICE O/P EST MOD 30 MIN: CPT

## 2025-04-30 ENCOUNTER — TELEPHONE (OUTPATIENT)
Age: 60
End: 2025-04-30

## 2025-05-01 DIAGNOSIS — Z13.6 ENCOUNTER FOR LIPID SCREENING FOR CARDIOVASCULAR DISEASE: ICD-10-CM

## 2025-05-01 DIAGNOSIS — Z12.5 PROSTATE CANCER SCREENING: ICD-10-CM

## 2025-05-01 DIAGNOSIS — Z13.220 ENCOUNTER FOR LIPID SCREENING FOR CARDIOVASCULAR DISEASE: ICD-10-CM

## 2025-05-01 DIAGNOSIS — Z21 HIV INFECTION, UNSPECIFIED SYMPTOM STATUS (HCC): Primary | ICD-10-CM

## 2025-05-01 DIAGNOSIS — R39.198 DIFFICULTY URINATING: ICD-10-CM

## 2025-05-01 NOTE — TELEPHONE ENCOUNTER
Pt returned call.   Doing ok.   Not taking ART but states \" I think I need to at least restart Triumeq. I'm just feeling to tired\"   Discussed at length. Compliance discussed. Also encouraged [facility] follow up.   Asking for labs and an apt.  Apt scheduled 5.21.25  Labs faxed with [facility] with confirm. He will complete labs this or next week.   Discussed transportation -- he will ask his sister for assistance. If issues arise he will call to discuss options as needed.

## 2025-05-06 ENCOUNTER — HOSPITAL ENCOUNTER (EMERGENCY)
Facility: HOSPITAL | Age: 60
Discharge: HOME | End: 2025-05-06
Attending: STUDENT IN AN ORGANIZED HEALTH CARE EDUCATION/TRAINING PROGRAM
Payer: COMMERCIAL

## 2025-05-06 VITALS
TEMPERATURE: 97.3 F | OXYGEN SATURATION: 97 % | DIASTOLIC BLOOD PRESSURE: 71 MMHG | RESPIRATION RATE: 18 BRPM | WEIGHT: 169 LBS | HEIGHT: 69 IN | BODY MASS INDEX: 25.03 KG/M2 | SYSTOLIC BLOOD PRESSURE: 110 MMHG | HEART RATE: 99 BPM

## 2025-05-06 DIAGNOSIS — L03.115 CELLULITIS OF RIGHT LOWER EXTREMITY: Primary | ICD-10-CM

## 2025-05-06 PROCEDURE — 2500000001 HC RX 250 WO HCPCS SELF ADMINISTERED DRUGS (ALT 637 FOR MEDICARE OP): Performed by: STUDENT IN AN ORGANIZED HEALTH CARE EDUCATION/TRAINING PROGRAM

## 2025-05-06 PROCEDURE — 99283 EMERGENCY DEPT VISIT LOW MDM: CPT | Performed by: STUDENT IN AN ORGANIZED HEALTH CARE EDUCATION/TRAINING PROGRAM

## 2025-05-06 RX ORDER — CLINDAMYCIN HYDROCHLORIDE 150 MG/1
450 CAPSULE ORAL ONCE
Status: COMPLETED | OUTPATIENT
Start: 2025-05-06 | End: 2025-05-06

## 2025-05-06 RX ORDER — CLINDAMYCIN HYDROCHLORIDE 150 MG/1
450 CAPSULE ORAL 3 TIMES DAILY
Qty: 63 CAPSULE | Refills: 0 | Status: SHIPPED | OUTPATIENT
Start: 2025-05-06 | End: 2025-05-13

## 2025-05-06 RX ADMIN — CLINDAMYCIN HYDROCHLORIDE 450 MG: 150 CAPSULE ORAL at 02:35

## 2025-05-06 ASSESSMENT — LIFESTYLE VARIABLES
EVER HAD A DRINK FIRST THING IN THE MORNING TO STEADY YOUR NERVES TO GET RID OF A HANGOVER: NO
TOTAL SCORE: 0
HAVE PEOPLE ANNOYED YOU BY CRITICIZING YOUR DRINKING: NO
HAVE YOU EVER FELT YOU SHOULD CUT DOWN ON YOUR DRINKING: NO
EVER FELT BAD OR GUILTY ABOUT YOUR DRINKING: NO

## 2025-05-06 ASSESSMENT — PAIN DESCRIPTION - ORIENTATION: ORIENTATION: RIGHT

## 2025-05-06 ASSESSMENT — PAIN - FUNCTIONAL ASSESSMENT: PAIN_FUNCTIONAL_ASSESSMENT: 0-10

## 2025-05-06 ASSESSMENT — PAIN SCALES - GENERAL: PAINLEVEL_OUTOF10: 7

## 2025-05-06 ASSESSMENT — COLUMBIA-SUICIDE SEVERITY RATING SCALE - C-SSRS
2. HAVE YOU ACTUALLY HAD ANY THOUGHTS OF KILLING YOURSELF?: NO
1. IN THE PAST MONTH, HAVE YOU WISHED YOU WERE DEAD OR WISHED YOU COULD GO TO SLEEP AND NOT WAKE UP?: NO
6. HAVE YOU EVER DONE ANYTHING, STARTED TO DO ANYTHING, OR PREPARED TO DO ANYTHING TO END YOUR LIFE?: NO

## 2025-05-06 ASSESSMENT — PAIN DESCRIPTION - LOCATION: LOCATION: FOOT

## 2025-05-06 NOTE — ED PROVIDER NOTES
Emergency Department Provider Note       History of Present Illness     History provided by: Patient  Limitations to History: None  External Records Reviewed with Brief Summary: None    HPI:  Chirstofer Walter is a 60 y.o. male who presents to the ED for redness swelling of his right heel.  Patient states that he noticed this has been progressing over approximately the past 3 to 4 days.  He states he has a history of skin infections and had been on Bactrim every other day for several years but had not been taking it for the past 9 months.  He states that he did start taking the Bactrim 2 days ago but is having worsening pain and swelling of the area.  He denies any fever or chills.    Physical Exam   Triage vitals:  T 36.3 °C (97.3 °F)  HR 99  /71  RR 18  O2 97 % None (Room air)    Physical Exam  Vitals and nursing note reviewed.   Constitutional:       General: He is not in acute distress.     Appearance: He is not toxic-appearing.   Cardiovascular:      Rate and Rhythm: Normal rate and regular rhythm.      Heart sounds: No murmur heard.     No gallop.   Pulmonary:      Effort: Pulmonary effort is normal.      Breath sounds: Normal breath sounds.   Musculoskeletal:      Cervical back: Neck supple.   Skin:     Comments: There is a small circular wound on the right heel with some surrounding erythema and tenderness to the touch.  It is warm to the touch.  No drainage or fluctuance.   Neurological:      Mental Status: He is alert.           Medical Decision Making & ED Course   Medical Decision Makin y.o. male who presents the ED for redness and swelling of his right heel.  This area is consistent with cellulitis.  No drainable abscess at this time.  Vital signs are stable here in the ED is afebrile.  He has been taking Bactrim for the past couple of days with no relief so we will switch him to oral clindamycin given the first dose here in the ED and writing him a prescription.  Discussed the need  for close follow-up with his outpatient provider.  We discussed return precautions.  Patient was discharged stable condition.  ----        ED Course:  Diagnoses as of 05/06/25 0223   Cellulitis of right lower extremity       Disposition   As a result of the work-up, the patient was discharged home.  he was informed of his diagnosis and instructed to come back with any concerns or worsening of condition.  he and was agreeable to the plan as discussed above.  he was given the opportunity to ask questions.  All of the patient's questions were answered.    Procedures   Procedures        Charles Patterson DO  Emergency Medicine                                                            Charles Patterson DO  05/06/25 0225
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
## 2025-05-07 ENCOUNTER — PREP FOR PROCEDURE (OUTPATIENT)
Dept: SURGERY | Facility: HOSPITAL | Age: 60
End: 2025-05-07
Payer: COMMERCIAL

## 2025-05-08 ENCOUNTER — TELEPHONE (OUTPATIENT)
Age: 60
End: 2025-05-08

## 2025-05-08 NOTE — TELEPHONE ENCOUNTER
Pt called in with update.   Labs completed at [facility]   Reviewed apt.  Open to re starting Triumeq   Recent ER for right heel swelling and redness/cellulitis  -- given clindamycin   Was supposed to be taking bactrim (chronic) but is not taking any meds.       Waiting on labs and up coming apt  Pt will call should issues with his leg arise.

## 2025-05-15 NOTE — PROGRESS NOTES
Re-Assessment      Patient ID:   Alee Anne  Date:   3/27/2023      Client ID:  [id number]    [address]  There are no phone numbers on file.     Family/House:    [] Partner  [] Children  [] Other -     Mental Health:   Hx of MH    Substance Abuse:   Hx of SA  Social History     Socioeconomic History    Marital status: Single     Spouse name: Not on file    Number of children: Not on file    Years of education: Not on file    Highest education level: Not on file   Occupational History    Not on file   Tobacco Use    Smoking status: Every Day     Packs/day: 2.00     Years: 44.00     Pack years: 88.00     Types: Cigarettes    Smokeless tobacco: Never    Tobacco comments:     3-4 PPD   Vaping Use    Vaping Use: Never used   Substance and Sexual Activity    Alcohol use: No     Alcohol/week: 0.0 standard drinks    Drug use: Yes     Types: Cocaine, Marijuana (Weed)     Comment: crack daily     Sexual activity: Not on file   Other Topics Concern    Not on file   Social History Narrative    Not on file     Social Determinants of Health     Financial Resource Strain: Not on file   Food Insecurity: Not on file   Transportation Needs: Not on file   Physical Activity: Not on file   Stress: Not on file   Social Connections: Not on file   Intimate Partner Violence: Not on file   Housing Stability: Not on file       Housing:   apartment    Health/Healthcare:  HIV Stable   Physician Visit:  Dr. Avel Solorio 2/23   Dental Visit:  Pt feels no need    CD4:   Lab Results   Component Value Date/Time    LABABSO 421 12/14/2022 10:54 AM    LABABSO 1258 02/01/2022 07:34 AM       Viral Load:  Lab Results   Component Value Date/Time    PGO6NKZEEIR Not Detected 12/14/2022 10:54 AM    MQX0PUGWXF 19,800 10/10/2022 12:34 PM    RCO0PQLNLP Not Detected 07/06/2022 07:51 AM       Medical Insurance:  Payor: Yasmin Crew / Plan: [address] / Product Type: *No Product type* /    OHDAP/HIPSCA: no

## 2025-05-15 NOTE — TELEPHONE ENCOUNTER
Noting error with lab results  HIV screened        Spoke with pt  He can go back tomorrow.     Call placed to [facility] to discuss lab error/concern.   Spoke with Gracia  She did see labs and results for what we received.   Noting that [facility] have different codes for orders than [employer] does.   Therefore, if they do not match the lab is not completed or what does populate with that code is drawn. (Hiv screen and not VL)   She is asking when faxing labs with [employer] codes to physically write the [facility] diagnostic codes next to each lab.   Noting other facilities use \"a order grid\" will discuss with Lab to see if that is an option.     Will re fax labs with new codes provided and verified from website and Gracia.

## 2025-05-21 ENCOUNTER — TELEPHONE (OUTPATIENT)
Age: 60
End: 2025-05-21

## 2025-05-21 ENCOUNTER — OFFICE VISIT (OUTPATIENT)
Age: 60
End: 2025-05-21
Payer: COMMERCIAL

## 2025-05-21 VITALS
DIASTOLIC BLOOD PRESSURE: 88 MMHG | BODY MASS INDEX: 21.71 KG/M2 | RESPIRATION RATE: 16 BRPM | TEMPERATURE: 98.1 F | OXYGEN SATURATION: 98 % | HEART RATE: 95 BPM | SYSTOLIC BLOOD PRESSURE: 122 MMHG | WEIGHT: 147 LBS

## 2025-05-21 DIAGNOSIS — F19.10 POLYSUBSTANCE ABUSE (HCC): ICD-10-CM

## 2025-05-21 DIAGNOSIS — F17.200 NICOTINE DEPENDENCE, UNCOMPLICATED, UNSPECIFIED NICOTINE PRODUCT TYPE: ICD-10-CM

## 2025-05-21 DIAGNOSIS — Z21 HIV INFECTION, UNSPECIFIED SYMPTOM STATUS (HCC): Primary | ICD-10-CM

## 2025-05-21 DIAGNOSIS — Z91.148 NONCOMPLIANCE WITH MEDICATION REGIMEN: ICD-10-CM

## 2025-05-21 PROCEDURE — 99214 OFFICE O/P EST MOD 30 MIN: CPT | Performed by: INTERNAL MEDICINE

## 2025-05-21 ASSESSMENT — ENCOUNTER SYMPTOMS: TROUBLE SWALLOWING: 1

## 2025-05-21 ASSESSMENT — PATIENT HEALTH QUESTIONNAIRE - PHQ9: DEPRESSION UNABLE TO ASSESS: PT REFUSES

## 2025-05-21 NOTE — ASSESSMENT & PLAN NOTE
Chronic, not at goal (unstable), had a lengthy discussion with the patient and he refuses to take any medications at this point   emphasized compliance with medications and follow-up appointment as well as with blood work  Educated about safe sex precautions.  Patient reports 1 sexual partner.  I advised to get his partner to get tested for HIV and to get PrEP if is negative.  Advised to bring partners to come and get seen in our office  Check pending CD4 and HIV viral load

## 2025-05-21 NOTE — TELEPHONE ENCOUNTER
Labs received and reviewed with Dr Post--         Call placed to review with pt and discuss POC  Reviewed CD4 280 and VL 18718  Pt is concerned this CD4 is low.   He has notable weight loss.   Pt is asking to restart Triumeq and wants to be as compliant as possible.       Spoke with Dr Post - ok to resume Triumeq  Once re started labs 3 months and follow up 4 months.       Spoke with pt  Pt agreeable. Pt will need ODAP assistance for co pays.   ODAP is currently  as well as [facility] Eligibility.   Discussed CM schedule. Offered apt 5.28.25 -- he will call Davina his other CM and discuss. He would like her to be there and help with transport.   He will call office right back to very time to finish scheduling.       Script to be sent when pt completed applications. Can't pay OOP

## 2025-05-21 NOTE — PROGRESS NOTES
B20 follow up.   Non compliant with all medications  Not taking ART for a while now.   Open to restarting depending on his lab results/ \"if to low yes\"     States he has 2 partners. Sexually active. No protection states the partners are aware of his status and that he is not on medication. Encouraged use of protection and medication compliance.       Smoking 2 PPD-- does not want to quit. Discussed.   Has been drinking shot a fire ball daily- this is new he states maybe a few months now.   Drug use noted meth today but states he did stop using crack.       Labs done 5.16.25 -- error noted prior - spoke with [facility] - they are faxing most recent results   Refuses all vaccines  Refuses colonoscopy   Refuses to discussed MH  CM from [employer] is with pt today. -- working on housing.   Needs RW updates. Will fax income and discuss with CM.   Refuses dental - no teeth

## 2025-05-21 NOTE — ASSESSMENT & PLAN NOTE
Chronic, not at goal (unstable), advised smoking cessation  Patient refuses to stop smoking, stop using drugs and alcohol.  Refuses to resume his medications.

## 2025-05-21 NOTE — PROGRESS NOTES
Adelso Cruz (:  1965) is a 60 y.o. male,Established patient, here for evaluation of the following chief complaint(s):  No chief complaint on file.         Assessment & Plan  HIV infection, unspecified symptom status (HCC)   Chronic, not at goal (unstable), had a lengthy discussion with the patient and he refuses to take any medications at this point   emphasized compliance with medications and follow-up appointment as well as with blood work  Educated about safe sex precautions.  Patient reports 1 sexual partner.  I advised to get his partner to get tested for HIV and to get PrEP if is negative.  Advised to bring partners to come and get seen in our office  Check pending CD4 and HIV viral load       Noncompliance with medication regimen   Chronic, not at goal (unstable),  , medication adherence emphasized, and lifestyle modifications recommended         Polysubstance abuse (HCC)   Chronic, not at goal (unstable), advised drug abstinence and drug rehabilitation         Nicotine dependence, uncomplicated, unspecified nicotine product type   Chronic, not at goal (unstable), advised smoking cessation  Patient refuses to stop smoking, stop using drugs and alcohol.  Refuses to resume his medications.            Subjective   HPI  Follow-up HIV on Triumeq.  Currently on no meds  Smokes 2 packs cigarettes daily  Drinks fireball on a daily basis  Used meth this morning  Stopped using crack cocaine  No current partners  Does not follow any sexual precautions  Refusing all vaccines  Refuses mental health screening  Reports 1 sexual partner of 2 years duration.  Unsure when was last tested for HIV.  Reports taking sexual precautions.   Review of Systems   Constitutional:  Positive for unexpected weight change (.  Severe weight loss of approximately 50 pounds since last visit).   HENT:  Positive for trouble swallowing (.  Chronic difficulty swallowing solid foods such as meat.  Edentulous).    Neurological:

## 2025-05-21 NOTE — ASSESSMENT & PLAN NOTE
Chronic, not at goal (unstable),  , medication adherence emphasized, and lifestyle modifications recommended

## 2025-05-28 ENCOUNTER — LAB (OUTPATIENT)
Age: 60
End: 2025-05-28

## 2025-05-29 NOTE — PROGRESS NOTES
status to all members of the household, including some sexual or drug some sexual or drug injection partners(potential barrier to medication adherence, risk for transmission)  (Refer to substance abuse and lefal issues sections)    []    [] Death/loss of primary support person         15. SEXUAL HEALTH/RISK REDUCTION                               Annual Score=     4                      Semi-Annual Score =           Evaluate the client's sexual health risks as it relate to their overall health and well-being    **inform the client that there are some basic things about sexual health that the medical case manager discussed with all clients when   completing an assessment. While this topic may be uncomfortable, it is important to acknowledge sexuality and sexual relationships as important  elements of an individual's overall health and well-being. Be sure to provide education surrounding risk reduction and methods to obtain safe sex protection (e.g., barrier protection, PrEP, etc.).**    78. How many sex partners have you had in the past 6 months? 2    79. Do you have a significant other? No                  If YES: 79a. Is your partner HIV-positive? No                                       If NO, not HIV-positive: 79a1. Are they on PrEP? No                                                                            If NO, not on PrEP: 79a1a. Would they be interested in a referral? No      80. In the past 6 months, which sexual activities have you engaged in? [] None     [] Vaginal Sex [] with a Male [] with a Female [] with a Transgender person   [] Anal Sex [] with a Male [] with a Female [] with a Transgender person   [x] Oral Sex [x] with a Male [] with a female [] with a Transgender person         81. In the past 6 months, how often have you used protection for:       Vaginal Sex  [] Always(100%)  [] Often (more than 50%)  [] Seldom (less than 50%)  [] Never (0%)  [] N/A     Anal Sex  [] Always (100%)  [] Often

## 2025-06-03 DIAGNOSIS — Z21 HIV INFECTION, UNSPECIFIED SYMPTOM STATUS (HCC): Primary | ICD-10-CM

## 2025-06-03 RX ORDER — ABACAVIR SULFATE, DOLUTEGRAVIR SODIUM, LAMIVUDINE 600; 50; 300 MG/1; MG/1; MG/1
TABLET, FILM COATED ORAL
Qty: 30 TABLET | Refills: 2 | Status: ACTIVE | OUTPATIENT
Start: 2025-06-03

## 2025-06-13 ENCOUNTER — TELEPHONE (OUTPATIENT)
Age: 60
End: 2025-06-13

## 2025-06-13 NOTE — TELEPHONE ENCOUNTER
Pt called into clinic to schedule an appointment for a f/u with Dr. Post.    This LPN scheduled pt for 07/29/25 @ 1145 a.    Pt advised per ID physicians to make sure labs are completed prior to appointment.  If lab work not available at that time pt will have to re-schedule.     Pt verbalized an understanding.

## 2025-06-20 ENCOUNTER — TELEPHONE (OUTPATIENT)
Age: 60
End: 2025-06-20

## 2025-06-20 NOTE — TELEPHONE ENCOUNTER
ART started 6.3.25  Labs to be completed 4 weeks  Pt will go to [email or web address]   Faxed with confirm.   Pt tolerating triumeq well. No complaints.   Follow up is already scheduled.   Denies questions or concerns and will call office if needed

## 2025-07-03 ENCOUNTER — TELEPHONE (OUTPATIENT)
Age: 60
End: 2025-07-03

## 2025-07-03 NOTE — TELEPHONE ENCOUNTER
Walk into office.  Picked up lab orders. Will go tomorrow.   Doing ok.   Taking ART. Forgets occasionally. But tolerating and trying harder.   Working with CM for housing still. Lost some weight.

## 2025-07-08 ENCOUNTER — TELEPHONE (OUTPATIENT)
Age: 60
End: 2025-07-08

## 2025-07-08 NOTE — TELEPHONE ENCOUNTER
Pt called into office asking for his labs to be faxed to [facility] and faxed to himself too. He provided his fax number [phone].     Labs faxed to [facility] and pt ALL with [facility] [facility] codes with confirmation.     Requested both call should issues arise.     Noted pt will go tomorrow to complete labs

## 2025-07-11 ENCOUNTER — TELEPHONE (OUTPATIENT)
Age: 60
End: 2025-07-11

## 2025-07-11 NOTE — TELEPHONE ENCOUNTER
Call placed to pt to discuss apt changes. Apt scheduled for 7.29.25.  Dr Post is out of the office.        We were able to reschedule to 8.7.25 at 1145     Pt to complete labs --Pt will go to [facility]   Faxed with confirm with all [facility] diagnostic codes. Pt has copy as well.       Denies any other concerns or issues and will call with needs

## 2025-07-26 ENCOUNTER — APPOINTMENT (OUTPATIENT)
Dept: CARDIOLOGY | Facility: HOSPITAL | Age: 60
End: 2025-07-26
Payer: COMMERCIAL

## 2025-07-26 ENCOUNTER — APPOINTMENT (OUTPATIENT)
Dept: RADIOLOGY | Facility: HOSPITAL | Age: 60
End: 2025-07-26
Payer: COMMERCIAL

## 2025-07-26 ENCOUNTER — HOSPITAL ENCOUNTER (EMERGENCY)
Facility: HOSPITAL | Age: 60
Discharge: HOME | End: 2025-07-26
Attending: EMERGENCY MEDICINE
Payer: COMMERCIAL

## 2025-07-26 VITALS
WEIGHT: 169 LBS | HEIGHT: 69 IN | OXYGEN SATURATION: 100 % | HEART RATE: 64 BPM | DIASTOLIC BLOOD PRESSURE: 62 MMHG | TEMPERATURE: 97.7 F | SYSTOLIC BLOOD PRESSURE: 110 MMHG | BODY MASS INDEX: 25.03 KG/M2 | RESPIRATION RATE: 18 BRPM

## 2025-07-26 DIAGNOSIS — J44.1 COPD EXACERBATION (MULTI): Primary | ICD-10-CM

## 2025-07-26 LAB
ALBUMIN SERPL BCP-MCNC: 4.1 G/DL (ref 3.4–5)
ALP SERPL-CCNC: 94 U/L (ref 33–120)
ALT SERPL W P-5'-P-CCNC: 13 U/L (ref 10–52)
ANION GAP SERPL CALC-SCNC: 9 MMOL/L (ref 10–20)
AST SERPL W P-5'-P-CCNC: 15 U/L (ref 9–39)
ATRIAL RATE: 70 BPM
BASOPHILS # BLD AUTO: 0.04 X10*3/UL (ref 0–0.1)
BASOPHILS NFR BLD AUTO: 0.8 %
BILIRUB SERPL-MCNC: 0.3 MG/DL (ref 0–1.2)
BNP SERPL-MCNC: 10 PG/ML (ref 0–99)
BUN SERPL-MCNC: 17 MG/DL (ref 6–23)
CALCIUM SERPL-MCNC: 9.4 MG/DL (ref 8.6–10.3)
CARDIAC TROPONIN I PNL SERPL HS: 6 NG/L (ref 0–20)
CHLORIDE SERPL-SCNC: 103 MMOL/L (ref 98–107)
CO2 SERPL-SCNC: 31 MMOL/L (ref 21–32)
CREAT SERPL-MCNC: 1.63 MG/DL (ref 0.5–1.3)
EGFRCR SERPLBLD CKD-EPI 2021: 49 ML/MIN/1.73M*2
EOSINOPHIL # BLD AUTO: 0.13 X10*3/UL (ref 0–0.7)
EOSINOPHIL NFR BLD AUTO: 2.5 %
ERYTHROCYTE [DISTWIDTH] IN BLOOD BY AUTOMATED COUNT: 14.4 % (ref 11.5–14.5)
GLUCOSE SERPL-MCNC: 89 MG/DL (ref 74–99)
HCT VFR BLD AUTO: 46.1 % (ref 41–52)
HGB BLD-MCNC: 15 G/DL (ref 13.5–17.5)
IMM GRANULOCYTES # BLD AUTO: 0.02 X10*3/UL (ref 0–0.7)
IMM GRANULOCYTES NFR BLD AUTO: 0.4 % (ref 0–0.9)
INR PPP: 1 (ref 0.9–1.1)
LACTATE SERPL-SCNC: 0.7 MMOL/L (ref 0.4–2)
LYMPHOCYTES # BLD AUTO: 1.64 X10*3/UL (ref 1.2–4.8)
LYMPHOCYTES NFR BLD AUTO: 31.2 %
MAGNESIUM SERPL-MCNC: 2.17 MG/DL (ref 1.6–2.4)
MCH RBC QN AUTO: 29.8 PG (ref 26–34)
MCHC RBC AUTO-ENTMCNC: 32.5 G/DL (ref 32–36)
MCV RBC AUTO: 92 FL (ref 80–100)
MONOCYTES # BLD AUTO: 0.54 X10*3/UL (ref 0.1–1)
MONOCYTES NFR BLD AUTO: 10.3 %
NEUTROPHILS # BLD AUTO: 2.88 X10*3/UL (ref 1.2–7.7)
NEUTROPHILS NFR BLD AUTO: 54.8 %
NRBC BLD-RTO: 0 /100 WBCS (ref 0–0)
P AXIS: 77 DEGREES
P OFFSET: 214 MS
P ONSET: 161 MS
PLATELET # BLD AUTO: 226 X10*3/UL (ref 150–450)
POTASSIUM SERPL-SCNC: 4 MMOL/L (ref 3.5–5.3)
PR INTERVAL: 132 MS
PROT SERPL-MCNC: 8 G/DL (ref 6.4–8.2)
PROTHROMBIN TIME: 10.9 SECONDS (ref 9.8–12.4)
Q ONSET: 227 MS
QRS COUNT: 11 BEATS
QRS DURATION: 78 MS
QT INTERVAL: 378 MS
QTC CALCULATION(BAZETT): 408 MS
QTC FREDERICIA: 398 MS
R AXIS: 87 DEGREES
RBC # BLD AUTO: 5.03 X10*6/UL (ref 4.5–5.9)
SODIUM SERPL-SCNC: 139 MMOL/L (ref 136–145)
T AXIS: 72 DEGREES
T OFFSET: 416 MS
VENTRICULAR RATE: 70 BPM
WBC # BLD AUTO: 5.3 X10*3/UL (ref 4.4–11.3)

## 2025-07-26 PROCEDURE — 83605 ASSAY OF LACTIC ACID: CPT | Performed by: EMERGENCY MEDICINE

## 2025-07-26 PROCEDURE — 84075 ASSAY ALKALINE PHOSPHATASE: CPT | Performed by: EMERGENCY MEDICINE

## 2025-07-26 PROCEDURE — 85025 COMPLETE CBC W/AUTO DIFF WBC: CPT | Performed by: EMERGENCY MEDICINE

## 2025-07-26 PROCEDURE — 84484 ASSAY OF TROPONIN QUANT: CPT | Performed by: EMERGENCY MEDICINE

## 2025-07-26 PROCEDURE — 83880 ASSAY OF NATRIURETIC PEPTIDE: CPT | Performed by: EMERGENCY MEDICINE

## 2025-07-26 PROCEDURE — 85610 PROTHROMBIN TIME: CPT | Performed by: EMERGENCY MEDICINE

## 2025-07-26 PROCEDURE — 71045 X-RAY EXAM CHEST 1 VIEW: CPT | Mod: FOREIGN READ | Performed by: RADIOLOGY

## 2025-07-26 PROCEDURE — 2500000002 HC RX 250 W HCPCS SELF ADMINISTERED DRUGS (ALT 637 FOR MEDICARE OP, ALT 636 FOR OP/ED): Performed by: EMERGENCY MEDICINE

## 2025-07-26 PROCEDURE — 99285 EMERGENCY DEPT VISIT HI MDM: CPT | Mod: 25 | Performed by: EMERGENCY MEDICINE

## 2025-07-26 PROCEDURE — 71045 X-RAY EXAM CHEST 1 VIEW: CPT

## 2025-07-26 PROCEDURE — 93005 ELECTROCARDIOGRAM TRACING: CPT

## 2025-07-26 PROCEDURE — 83735 ASSAY OF MAGNESIUM: CPT | Performed by: EMERGENCY MEDICINE

## 2025-07-26 PROCEDURE — 94640 AIRWAY INHALATION TREATMENT: CPT

## 2025-07-26 PROCEDURE — 36415 COLL VENOUS BLD VENIPUNCTURE: CPT | Performed by: EMERGENCY MEDICINE

## 2025-07-26 RX ORDER — PREDNISONE 20 MG/1
60 TABLET ORAL DAILY
Qty: 15 TABLET | Refills: 0 | Status: SHIPPED | OUTPATIENT
Start: 2025-07-26 | End: 2025-07-31

## 2025-07-26 RX ORDER — DOXYCYCLINE 100 MG/1
100 CAPSULE ORAL 2 TIMES DAILY
Qty: 20 CAPSULE | Refills: 0 | Status: SHIPPED | OUTPATIENT
Start: 2025-07-26 | End: 2025-08-05

## 2025-07-26 RX ORDER — IPRATROPIUM BROMIDE AND ALBUTEROL SULFATE 2.5; .5 MG/3ML; MG/3ML
3 SOLUTION RESPIRATORY (INHALATION) ONCE
Status: COMPLETED | OUTPATIENT
Start: 2025-07-26 | End: 2025-07-26

## 2025-07-26 RX ORDER — ALBUTEROL SULFATE 90 UG/1
1-2 INHALANT RESPIRATORY (INHALATION) EVERY 6 HOURS PRN
Qty: 18 G | Refills: 0 | Status: SHIPPED | OUTPATIENT
Start: 2025-07-26 | End: 2025-08-25

## 2025-07-26 RX ADMIN — IPRATROPIUM BROMIDE AND ALBUTEROL SULFATE 3 ML: .5; 3 SOLUTION RESPIRATORY (INHALATION) at 19:35

## 2025-07-26 ASSESSMENT — LIFESTYLE VARIABLES
HAVE YOU EVER FELT YOU SHOULD CUT DOWN ON YOUR DRINKING: NO
TOTAL SCORE: 0
EVER FELT BAD OR GUILTY ABOUT YOUR DRINKING: NO
HAVE PEOPLE ANNOYED YOU BY CRITICIZING YOUR DRINKING: NO
EVER HAD A DRINK FIRST THING IN THE MORNING TO STEADY YOUR NERVES TO GET RID OF A HANGOVER: NO

## 2025-07-26 ASSESSMENT — PAIN - FUNCTIONAL ASSESSMENT: PAIN_FUNCTIONAL_ASSESSMENT: 0-10

## 2025-07-26 ASSESSMENT — PAIN SCALES - GENERAL: PAINLEVEL_OUTOF10: 0 - NO PAIN

## 2025-07-26 NOTE — ED PROVIDER NOTES
HPI   Chief Complaint   Patient presents with    Shortness of Breath         History provided by:  Patient    Chief Complaint   Patient presents with    Shortness of Breath       History of Present Illness:  Christofer Walter is a 60 y.o. male presents with shortness of breath for the past 1 week.  Has associated productive cough.  No fever or chills.  No chest pain.  No back or flank pain.  No abdominal pain.  No nausea or vomiting.  No leg swelling.  Nothing seems to make his symptoms worse or better.  No treatment prior to arrival.  No sick contacts.  No trauma or travel.      PMFSH:   As per HPI, otherwise nurses notes reviewed in EMR.    Past Medical History: Medical History[1]   Past Surgical History: Surgical History[2]   Family History: Family History[3]   Social History:  Social History[4]  Allergies: Allergies[5]  Current Outpatient Medications   Medication Instructions    abacavir-dolutegravir-lamivud (Triumeq) 600- mg tablet 1 tablet, oral, Daily, Do not crush, chew, or split.    acetaminophen (TYLENOL 8 HOUR) 650 mg, oral, Every 8 hours PRN, Do not crush, chew, or split.    albuterol 90 mcg/actuation inhaler inhalation    albuterol 90 mcg/actuation inhaler 1-2 puffs, inhalation, Every 6 hours PRN    cholecalciferol (VITAMIN D-3) 50,000 Units, oral, Once Weekly    diclofenac sodium (VOLTAREN) 2 g, Topical, 2 times daily    doxycycline (VIBRAMYCIN) 100 mg, oral, 2 times daily, Take with at least 8 ounces (large glass) of water, do not lie down for 30 minutes after    fluticasone (Flovent) 110 mcg/actuation inhaler 2 puffs, inhalation, 2 times daily PRN,  inhale 2 puffs by mouth and INTO THE LUNGS    ipratropium (Atrovent) 21 mcg (0.03 %) nasal spray 2 sprays, Each Nostril, Every 12 hours    Lyrica 100 mg, oral, 3 times daily    mv,Ca,min-iron-FA-lycopene (Centrum Men) 8 mg iron- 200 mcg-600 mcg tablet 1 tablet, oral, Daily    OXcarbazepine (Trileptal) 150 mg tablet     pantoprazole (PROTONIX) 40 mg,  "oral, Daily, Do not crush, chew, or split.    predniSONE (DELTASONE) 60 mg, oral, Daily    sulfamethoxazole-trimethoprim (Bactrim DS) 800-160 mg tablet 1 tablet, oral, Every other day    sulfamethoxazole-trimethoprim (Bactrim DS) 800-160 mg tablet 1 tablet, oral, Daily    SUMAtriptan (IMITREX) 100 mg, oral, Once as needed    topiramate (TOPAMAX) 50 mg, oral, Nightly    traMADol (ULTRAM) 50 mg, oral, Every 6 hours PRN    ziprasidone (Geodon) 20 mg capsule               Patient History   Medical History[6]  Surgical History[7]  Family History[8]  Social History[9]    Physical Exam   ED Triage Vitals [07/26/25 1854]   Temperature Heart Rate Respirations BP   36.5 °C (97.7 °F) 73 18 110/62      Pulse Ox Temp Source Heart Rate Source Patient Position   95 % Temporal -- --      BP Location FiO2 (%)     -- --       Physical Exam  Physical Exam:    ED Triage Vitals [07/26/25 1854]   Temperature Heart Rate Respirations BP   36.5 °C (97.7 °F) 73 18 110/62      Pulse Ox Temp Source Heart Rate Source Patient Position   95 % Temporal -- --      BP Location FiO2 (%)     -- --         Patient Vitals for the past 24 hrs:   BP Temp Temp src Pulse Resp SpO2 Height Weight   07/26/25 2047 -- -- -- 64 18 100 % -- --   07/26/25 1935 -- -- -- -- -- 99 % -- --   07/26/25 1854 110/62 36.5 °C (97.7 °F) Temporal 73 18 95 % 1.753 m (5' 9\") 76.7 kg (169 lb)       Constitutional: Vital signs per nursing notes.  Well developed, well nourished.  No acute distress.    Psychiatric: alert and oriented to person, place, and time; no abnormalities of mood or affect; memory intact  Eyes: PERRL; conjunctivae and lids normal; EOMI  ENT: otoscopic exam of external canal and TM´s normal; nasal mucosa, turbinates, and septum normal; mouth, tongue, and pharynx normal; pharynx without edema, exudate, or injection  Respiratory: normal respiratory effort and excursion; no rales, rhonchi, or wheezes; equal but diminished air entry  Cardiovascular: regular rate " and rhythm; no murmurs, rubs or gallops; symmetric pulses; no edema; normal capillary refill; distal pulses present  Neurological: normal speech; CN II-XII grossly intact; normal motor and sensory function; no nystagmus  GI: no masses, tenderness, rebound or guarding; no palpable, pulsatile mass; no organomegaly; no hernia; normal bowel sounds; (-) Dick´s sign; (-) McBurney´s sign; (-) CVA tenderness  Lymphatic: no adenopathy of neck  Musculoskeletal: normal gait and station; normal digits and nails; no gross tendon or ligament injury; normal to palpation; normal strength/tone; neurovascular status intact; (-) Patito´s sign; (-) straight leg raise; no palpable cords; calf circumference equal bilaterally  Skin: normal to inspection; normal to palpation; no rash  GCS: 15      ED Course & MDM   Diagnoses as of 07/26/25 2048   COPD exacerbation (Multi)                 No data recorded     [place] Coma Scale Score: 15 (07/26/25 1853 : Maycol Johnson RN)                           Medical Decision Making  Medical Decision Making:    EKG: My interpretation of EKG is normal sinus rhythm at 70 bpm with no acute ST-T changes    Labs:   Labs Reviewed   COMPREHENSIVE METABOLIC PANEL - Abnormal       Result Value    Glucose 89      Sodium 139      Potassium 4.0      Chloride 103      Bicarbonate 31      Anion Gap 9 (*)     Urea Nitrogen 17      Creatinine 1.63 (*)     eGFR 49 (*)     Calcium 9.4      Albumin 4.1      Alkaline Phosphatase 94      Total Protein 8.0      AST 15      Bilirubin, Total 0.3      ALT 13     MAGNESIUM - Normal    Magnesium 2.17     B-TYPE NATRIURETIC PEPTIDE - Normal    BNP 10      Narrative:        <100 pg/mL - Heart failure unlikely  100-299 pg/mL - Intermediate probability of acute heart                  failure exacerbation. Correlate with clinical                  context and patient history.    >=300 pg/mL - Heart Failure likely. Correlate with clinical                  context and patient  history.    BNP testing is performed using different testing methodology at [facility] than at other [facility]. Direct result comparisons should only be made within the same method.      PROTIME-INR - Normal    Protime 10.9      INR 1.0     LACTATE - Normal    Lactate 0.7      Narrative:     Venipuncture immediately after or during the administration of Metamizole may lead to falsely low results. Testing should be performed immediately prior to Metamizole dosing.   SERIAL TROPONIN-INITIAL - Normal    Troponin I, High Sensitivity 6      Narrative:     Less than 99th percentile of normal range cutoff-  Female and children under 18 years old <14 ng/L; Male <21 ng/L: Negative  Repeat testing should be performed if clinically indicated.     Female and children under 18 years old 14-50 ng/L; Male 21-50 ng/L:  Consistent with possible cardiac damage and possible increased clinical   risk. Serial measurements may help to assess extent of myocardial damage.     >50 ng/L: Consistent with cardiac damage, increased clinical risk and  myocardial infarction. Serial measurements may help assess extent of   myocardial damage.      NOTE: Children less than 1 year old may have higher baseline troponin   levels and results should be interpreted in conjunction with the overall   clinical context.     NOTE: Troponin I testing is performed using a different   testing methodology at [facility] than at other   [facility]. Direct result comparisons should only   be made within the same method.   CBC WITH AUTO DIFFERENTIAL    WBC 5.3      nRBC 0.0      RBC 5.03      Hemoglobin 15.0      Hematocrit 46.1      MCV 92      MCH 29.8      MCHC 32.5      RDW 14.4      Platelets 226      Neutrophils % 54.8      Immature Granulocytes %, Automated 0.4      Lymphocytes % 31.2      Monocytes % 10.3      Eosinophils % 2.5      Basophils % 0.8      Neutrophils Absolute 2.88      Immature Granulocytes Absolute,  Automated 0.02      Lymphocytes Absolute 1.64      Monocytes Absolute 0.54      Eosinophils Absolute 0.13      Basophils Absolute 0.04     TROPONIN SERIES- (INITIAL, 1 HR)    Narrative:     The following orders were created for panel order Troponin I Series, High Sensitivity (0, 1 HR).  Procedure                               Abnormality         Status                     ---------                               -----------         ------                     Troponin I, High Sensiti...[[id number]]  Normal              Final result               Troponin, High Sensitivi...[[id number]]                                                   Please view results for these tests on the individual orders.   SERIAL TROPONIN, 1 HOUR       Diagnostic Imaging:   XR chest 1 view   Final Result   No acute cardiopulmonary process.   Signed by Zechariah Vazquez MD          ED Medication Administration:   Medications   ipratropium-albuteroL (Duo-Neb) 0.5-2.5 mg/3 mL nebulizer solution 3 mL (3 mL nebulization Given 7/26/25 1935)       ED Course:     Christofer Walter is a 60 y.o. male presents with shortness of breath.    Differential Diagnoses Considered:  ACS, arrhythmia, COPD, CHF, pneumonia, bronchitis    Patient presents with shortness of breath.    Chest x-ray to evaluate for evidence of pneumonia/pneumothorax/CHF/COPD.     EKG/troponin to evaluate for evidence of arrhythmia/ACS.    Lab work to evaluate for evidence of anemia or significant electrolyte abnormality including hypokalemia, hyperkalemia, hyponatremia, hypernatremia, hyperglycemia or hypoglycemia.     Considered but did not perform emergent testing for RSV/Influenza/COVID-19 at this time as it would not change medical management.    Considered CT chest, but no CT chest indicated as I considered but do not suspect aortic dissection/pulmonary embolus.        Diagnoses as of 07/26/25 2048   COPD exacerbation (Multi)       Abnormal Labs Reviewed   COMPREHENSIVE METABOLIC PANEL  - Abnormal; Notable for the following components:       Result Value    Anion Gap 9 (*)     Creatinine 1.63 (*)     eGFR 49 (*)     All other components within normal limits       BP      Temp      Pulse 64 (07/26/25 2047)   Resp 18 (07/26/25 2047)    SpO2 100 % (07/26/25 2047)          Diagnostic evaluation was completed.  Troponin is in the normal range.  BNP is in the normal range for an aspect CHF.  Metabolic panel shows a normal glucose.  Sodium potassium in the normal range.  Urine is normal with a slightly elevated creatinine of 1.63.  Liver function tests are in the normal range.  Lactate is normal.  INR is 1.0.  CBC shows normal white blood cell count and no evidence of anemia.  Platelets are in the normal range.    My interpretation of chest x-ray is lungs clear, bones intact and no cardiomegaly.  The lungs are hyperexpanded and consistent with COPD.    The patient was treated with a DuoNeb.    Medications administered improved the patient's condition.    The patient feels much better.  He does not want to wait for a repeat troponin or the final radiology read of his chest x-ray.  He is comfortable going home and following up with his primary care doctor.    I suspect the patient is likely suffering from acute COPD exacerbation which is mild.  He will be discharged home with short-term follow-up with his primary care doctor.    I discussed the results and discharge plan with the patient and/or family/friend if present.  I emphasized the importance of follow up with the physician I referred them to in the timeframe recommended.  I explained reasons for the patient to return to the Emergency Department.  Questions were addressed.  The patient and/or family/friend expressed understanding.      Patient was instructed to have follow up with primary doctor or specialist within 1-3 days.      Shared decision making made with patient, and/or family, who agrees with plan.      Prescription Medication  Consideration/Given:   ED Prescriptions       Medication Sig Dispense Start Date End Date Auth. Provider    albuterol 90 mcg/actuation inhaler Inhale 1-2 puffs every 6 hours if needed for wheezing. 18 g 7/26/2025 8/25/2025 Harrison CLARK MD    predniSONE (Deltasone) 20 mg tablet Take 3 tablets (60 mg) by mouth once daily for 5 days. 15 tablet 7/26/2025 7/31/2025 Harrison CLARK MD    doxycycline (Vibramycin) 100 mg capsule Take 1 capsule (100 mg) by mouth 2 times a day for 10 days. Take with at least 8 ounces (large glass) of water, do not lie down for 30 minutes after 20 capsule 7/26/2025 8/5/2025 Harrison CLARK MD            Diagnosis:   1. COPD exacerbation (Multi)              Procedure  Procedures       Harrison CLARK MD  07/26/25 2044         [1]   Past Medical History:  Diagnosis Date    Disorder of the skin and subcutaneous tissue, unspecified 06/28/2019    Skin sore    Encounter for examination of eyes and vision without abnormal findings 07/22/2021    Eye exam, routine    Encounter for general adult medical examination without abnormal findings 07/27/2022    Encounter for Medicare annual wellness exam    Encounter for immunization     Encounter for immunization    Encounter for screening for malignant neoplasm of colon 06/02/2021    Encounter for colorectal cancer screening    Encounter for screening for malignant neoplasm of prostate 07/22/2021    Encounter for prostate cancer screening    Hiccough     Hiccups    Human immunodeficiency virus (HIV) disease (Multi)     HIV (human immunodeficiency virus infection)    Other spondylosis with radiculopathy, lumbar region     Osteoarthritis of spine with radiculopathy, lumbar region    Personal history of other diseases of the musculoskeletal system and connective tissue 06/28/2019    History of fibromyalgia    Personal history of other diseases of the respiratory system     History of asthma    Personal history of other endocrine, nutritional and  metabolic disease     History of vitamin D deficiency    Personal history of other infectious and parasitic diseases 09/23/2020    History of candidiasis of mouth    Personal history of other specified conditions 06/28/2019    History of chronic pain    Personal history of other specified conditions 02/10/2021    History of insomnia   [2]   Past Surgical History:  Procedure Laterality Date    OTHER SURGICAL HISTORY  11/10/2020    Foot surgery   [3] No family history on file.  [4]   Social History  Tobacco Use    Smoking status: Every Day     Current packs/day: 2.00     Types: Cigarettes     Passive exposure: Current    Smokeless tobacco: Never   Vaping Use    Vaping status: Never Used   Substance Use Topics    Alcohol use: Not Currently    Drug use: Not Currently   [5]   Allergies  Allergen Reactions    Methadone Other     chest pain - tolerates percocet, vicodin, tramadol    Morphine Other     chest pain - tolerates percocet, vicodin, tramadol    Other Other     Colognes give numbness of lips   [6]   Past Medical History:  Diagnosis Date    Disorder of the skin and subcutaneous tissue, unspecified 06/28/2019    Skin sore    Encounter for examination of eyes and vision without abnormal findings 07/22/2021    Eye exam, routine    Encounter for general adult medical examination without abnormal findings 07/27/2022    Encounter for Medicare annual wellness exam    Encounter for immunization     Encounter for immunization    Encounter for screening for malignant neoplasm of colon 06/02/2021    Encounter for colorectal cancer screening    Encounter for screening for malignant neoplasm of prostate 07/22/2021    Encounter for prostate cancer screening    Hiccough     Hiccups    Human immunodeficiency virus (HIV) disease (Multi)     HIV (human immunodeficiency virus infection)    Other spondylosis with radiculopathy, lumbar region     Osteoarthritis of spine with radiculopathy, lumbar region    Personal history of other  diseases of the musculoskeletal system and connective tissue 06/28/2019    History of fibromyalgia    Personal history of other diseases of the respiratory system     History of asthma    Personal history of other endocrine, nutritional and metabolic disease     History of vitamin D deficiency    Personal history of other infectious and parasitic diseases 09/23/2020    History of candidiasis of mouth    Personal history of other specified conditions 06/28/2019    History of chronic pain    Personal history of other specified conditions 02/10/2021    History of insomnia   [7]   Past Surgical History:  Procedure Laterality Date    OTHER SURGICAL HISTORY  11/10/2020    Foot surgery   [8] No family history on file.  [9]   Social History  Tobacco Use    Smoking status: Every Day     Current packs/day: 2.00     Types: Cigarettes     Passive exposure: Current    Smokeless tobacco: Never   Vaping Use    Vaping status: Never Used   Substance Use Topics    Alcohol use: Not Currently    Drug use: Not Currently        Harrison CLARK MD  07/26/25 2045

## 2025-07-28 ENCOUNTER — OFFICE VISIT (OUTPATIENT)
Age: 60
End: 2025-07-28
Payer: COMMERCIAL

## 2025-07-28 VITALS
HEIGHT: 69 IN | RESPIRATION RATE: 17 BRPM | SYSTOLIC BLOOD PRESSURE: 138 MMHG | OXYGEN SATURATION: 96 % | HEART RATE: 97 BPM | BODY MASS INDEX: 22.42 KG/M2 | TEMPERATURE: 97.7 F | DIASTOLIC BLOOD PRESSURE: 86 MMHG | WEIGHT: 151.4 LBS

## 2025-07-28 DIAGNOSIS — L97.412 NEUROPATHIC ULCER OF RIGHT HEEL WITH FAT LAYER EXPOSED (HCC): ICD-10-CM

## 2025-07-28 DIAGNOSIS — J44.1 COPD EXACERBATION (HCC): ICD-10-CM

## 2025-07-28 DIAGNOSIS — Z12.5 PROSTATE CANCER SCREENING: ICD-10-CM

## 2025-07-28 DIAGNOSIS — M54.50 CHRONIC RIGHT-SIDED LOW BACK PAIN, UNSPECIFIED WHETHER SCIATICA PRESENT: ICD-10-CM

## 2025-07-28 DIAGNOSIS — F33.3 MDD (MAJOR DEPRESSIVE DISORDER), RECURRENT, SEVERE, WITH PSYCHOSIS (HCC): ICD-10-CM

## 2025-07-28 DIAGNOSIS — M25.551 PAIN OF RIGHT HIP: Primary | ICD-10-CM

## 2025-07-28 DIAGNOSIS — G89.29 CHRONIC RIGHT-SIDED LOW BACK PAIN, UNSPECIFIED WHETHER SCIATICA PRESENT: ICD-10-CM

## 2025-07-28 DIAGNOSIS — R79.89 ELEVATED SERUM CREATININE: ICD-10-CM

## 2025-07-28 PROCEDURE — 99214 OFFICE O/P EST MOD 30 MIN: CPT | Performed by: NURSE PRACTITIONER

## 2025-07-28 RX ORDER — PREDNISONE 20 MG/1
60 TABLET ORAL DAILY
COMMUNITY
Start: 2025-07-26 | End: 2025-07-28 | Stop reason: SINTOL

## 2025-07-28 RX ORDER — DOXYCYCLINE 100 MG/1
100 CAPSULE ORAL 2 TIMES DAILY
COMMUNITY
Start: 2025-07-26 | End: 2025-08-05

## 2025-07-28 SDOH — ECONOMIC STABILITY: FOOD INSECURITY: WITHIN THE PAST 12 MONTHS, YOU WORRIED THAT YOUR FOOD WOULD RUN OUT BEFORE YOU GOT MONEY TO BUY MORE.: NEVER TRUE

## 2025-07-28 SDOH — ECONOMIC STABILITY: FOOD INSECURITY: WITHIN THE PAST 12 MONTHS, THE FOOD YOU BOUGHT JUST DIDN'T LAST AND YOU DIDN'T HAVE MONEY TO GET MORE.: NEVER TRUE

## 2025-07-28 ASSESSMENT — PATIENT HEALTH QUESTIONNAIRE - PHQ9
1. LITTLE INTEREST OR PLEASURE IN DOING THINGS: NOT AT ALL
SUM OF ALL RESPONSES TO PHQ QUESTIONS 1-9: 0
SUM OF ALL RESPONSES TO PHQ QUESTIONS 1-9: 0
7. TROUBLE CONCENTRATING ON THINGS, SUCH AS READING THE NEWSPAPER OR WATCHING TELEVISION: NOT AT ALL
6. FEELING BAD ABOUT YOURSELF - OR THAT YOU ARE A FAILURE OR HAVE LET YOURSELF OR YOUR FAMILY DOWN: NOT AT ALL
10. IF YOU CHECKED OFF ANY PROBLEMS, HOW DIFFICULT HAVE THESE PROBLEMS MADE IT FOR YOU TO DO YOUR WORK, TAKE CARE OF THINGS AT HOME, OR GET ALONG WITH OTHER PEOPLE: NOT DIFFICULT AT ALL
SUM OF ALL RESPONSES TO PHQ QUESTIONS 1-9: 0
SUM OF ALL RESPONSES TO PHQ QUESTIONS 1-9: 0
9. THOUGHTS THAT YOU WOULD BE BETTER OFF DEAD, OR OF HURTING YOURSELF: NOT AT ALL
8. MOVING OR SPEAKING SO SLOWLY THAT OTHER PEOPLE COULD HAVE NOTICED. OR THE OPPOSITE, BEING SO FIGETY OR RESTLESS THAT YOU HAVE BEEN MOVING AROUND A LOT MORE THAN USUAL: NOT AT ALL
3. TROUBLE FALLING OR STAYING ASLEEP: NOT AT ALL
2. FEELING DOWN, DEPRESSED OR HOPELESS: NOT AT ALL
5. POOR APPETITE OR OVEREATING: NOT AT ALL
4. FEELING TIRED OR HAVING LITTLE ENERGY: NOT AT ALL

## 2025-07-28 NOTE — PROGRESS NOTES
Subjective  Adelso NAZARIO Anthony, 60 y.o. male presents today with:  Chief Complaint   Patient presents with    Hip Pain     Patient present today with right hip pain for the past year. He tried aspirin with little relief. He would like a referral for ortho.    Shortness of Breath     Patient present today with SOB due to his COPD and was seen on Saturday, 7.26.25. he said that he is feeling better.       History of Present Illness  The patient presents for evaluation of right hip pain, COPD, and constipation.    He continues to experience right hip pain, which he describes as constant. He reports no new injuries or lower back pain. The pain does not radiate down his leg. He has not yet consulted an orthopedic specialist for this issue.    He sought emergency care on 07/26/2025 due to breathing difficulties. His condition has since improved with the use of albuterol. However, he believes he may need supplemental oxygen soon. He was prescribed a steroid medication but discontinued its use due to the development of thrush. He also uses a routine inhaler daily.    He reports having bowel movements only twice a week, a pattern that has persisted throughout his life. He does not take any stool softeners or fiber supplements. This does not bother him.      He has not yet consulted an orthopedic specialist for his shoulder.      Past Medical History:   Diagnosis Date    Anxiety     Cancer (HCC)     skin  L arm    Cellulitis of heel, right 2016    after surgery x 3 on tendion     CHF (congestive heart failure) (HCC)     COPD (chronic obstructive pulmonary disease) (HCC)     Depression     Erectile dysfunction     Headache(784.0)     HIV (human immunodeficiency virus infection) (HCC)     Hyperlipidemia     Liver disease     Osteoarthritis      Past Surgical History:   Procedure Laterality Date    CHOLECYSTECTOMY      CYST REMOVAL      R wrist x2    FOOT SURGERY      bone extraction    FRACTURE SURGERY      UPPER

## 2025-07-30 ENCOUNTER — HOSPITAL ENCOUNTER (OUTPATIENT)
Dept: RADIOLOGY | Facility: HOSPITAL | Age: 60
Discharge: HOME | End: 2025-07-30
Payer: COMMERCIAL

## 2025-07-30 DIAGNOSIS — M54.50 LOW BACK PAIN, UNSPECIFIED: ICD-10-CM

## 2025-07-30 DIAGNOSIS — G89.29 OTHER CHRONIC PAIN: ICD-10-CM

## 2025-07-30 DIAGNOSIS — M25.551 PAIN IN RIGHT HIP: ICD-10-CM

## 2025-07-30 LAB
BUN BLDV-MCNC: 18 MG/DL
CALCIUM SERPL-MCNC: 9.4 MG/DL
CHLORIDE BLD-SCNC: 103 MMOL/L
CO2: 30 MMOL/L
CREAT SERPL-MCNC: 1.04 MG/DL
EGFR: 83
GLUCOSE BLD-MCNC: 95 MG/DL
POTASSIUM SERPL-SCNC: 4.7 MMOL/L
SODIUM BLD-SCNC: 139 MMOL/L

## 2025-07-30 PROCEDURE — 72110 X-RAY EXAM L-2 SPINE 4/>VWS: CPT | Performed by: RADIOLOGY

## 2025-07-30 PROCEDURE — 72110 X-RAY EXAM L-2 SPINE 4/>VWS: CPT

## 2025-07-30 PROCEDURE — 73502 X-RAY EXAM HIP UNI 2-3 VIEWS: CPT | Mod: RT

## 2025-07-30 PROCEDURE — 73502 X-RAY EXAM HIP UNI 2-3 VIEWS: CPT | Mod: RIGHT SIDE | Performed by: RADIOLOGY

## 2025-08-04 LAB
ATRIAL RATE: 70 BPM
P AXIS: 77 DEGREES
P OFFSET: 214 MS
P ONSET: 161 MS
PR INTERVAL: 132 MS
Q ONSET: 227 MS
QRS COUNT: 11 BEATS
QRS DURATION: 78 MS
QT INTERVAL: 378 MS
QTC CALCULATION(BAZETT): 408 MS
QTC FREDERICIA: 398 MS
R AXIS: 87 DEGREES
T AXIS: 72 DEGREES
T OFFSET: 416 MS
VENTRICULAR RATE: 70 BPM

## 2025-08-06 ENCOUNTER — TELEPHONE (OUTPATIENT)
Age: 60
End: 2025-08-06

## 2025-08-15 DIAGNOSIS — R79.89 ELEVATED SERUM CREATININE: ICD-10-CM

## 2025-08-19 ENCOUNTER — TELEPHONE (OUTPATIENT)
Age: 60
End: 2025-08-19

## 2025-08-22 ENCOUNTER — TELEPHONE (OUTPATIENT)
Age: 60
End: 2025-08-22

## 2025-08-29 ENCOUNTER — OFFICE VISIT (OUTPATIENT)
Dept: ORTHOPEDIC SURGERY | Facility: CLINIC | Age: 60
End: 2025-08-29
Payer: COMMERCIAL

## 2025-08-29 DIAGNOSIS — M53.3 SI (SACROILIAC) JOINT DYSFUNCTION: Primary | ICD-10-CM

## 2025-08-29 PROBLEM — C44.02 SCC (SQUAMOUS CELL CARCINOMA), LIP: Status: RESOLVED | Noted: 2025-08-29 | Resolved: 2025-08-29

## 2025-08-29 PROBLEM — M67.431 GANGLION CYST OF VOLAR ASPECT OF RIGHT WRIST: Status: RESOLVED | Noted: 2018-10-30 | Resolved: 2025-08-29

## 2025-08-29 PROBLEM — B37.0 CANDIDIASIS OF MOUTH: Status: RESOLVED | Noted: 2025-08-29 | Resolved: 2025-08-29

## 2025-08-29 PROBLEM — R05.9 COUGH: Status: RESOLVED | Noted: 2025-08-29 | Resolved: 2025-08-29

## 2025-08-29 PROBLEM — R06.6 HICCUPS: Status: RESOLVED | Noted: 2025-08-29 | Resolved: 2025-08-29

## 2025-08-29 PROBLEM — Z98.890 POSTOPERATIVE STATE: Status: RESOLVED | Noted: 2019-10-14 | Resolved: 2025-08-29

## 2025-08-29 PROBLEM — L03.90 CELLULITIS: Status: RESOLVED | Noted: 2017-06-26 | Resolved: 2025-08-29

## 2025-08-29 PROBLEM — F14.90 CRACK COCAINE USE: Status: RESOLVED | Noted: 2017-07-18 | Resolved: 2025-08-29

## 2025-08-29 PROBLEM — H92.03 OTALGIA OF BOTH EARS: Status: RESOLVED | Noted: 2025-08-29 | Resolved: 2025-08-29

## 2025-08-29 PROBLEM — M60.20 FOREIGN BODY REACTION: Status: RESOLVED | Noted: 2018-11-12 | Resolved: 2025-08-29

## 2025-08-29 PROBLEM — F17.200 SMOKER: Status: RESOLVED | Noted: 2017-07-18 | Resolved: 2025-08-29

## 2025-08-29 PROBLEM — F14.10 COCAINE ABUSE: Status: RESOLVED | Noted: 2022-11-04 | Resolved: 2025-08-29

## 2025-08-29 PROBLEM — L90.5 SCARRING: Status: RESOLVED | Noted: 2019-09-16 | Resolved: 2025-08-29

## 2025-08-29 PROBLEM — F12.90 MARIJUANA USER: Status: RESOLVED | Noted: 2025-08-29 | Resolved: 2025-08-29

## 2025-08-29 PROBLEM — M25.569 KNEE PAIN: Status: ACTIVE | Noted: 2023-11-07

## 2025-08-29 PROBLEM — Z86.39 HISTORY OF NUTRITIONAL DEFICIENCY: Status: RESOLVED | Noted: 2025-08-29 | Resolved: 2025-08-29

## 2025-08-29 PROBLEM — Z98.890 HISTORY OF SURGICAL PROCEDURE: Status: RESOLVED | Noted: 2019-09-16 | Resolved: 2025-08-29

## 2025-08-29 PROBLEM — L03.115 CELLULITIS OF FOOT, RIGHT: Status: RESOLVED | Noted: 2017-07-14 | Resolved: 2025-08-29

## 2025-08-29 PROCEDURE — 99212 OFFICE O/P EST SF 10 MIN: CPT | Performed by: PHYSICIAN ASSISTANT

## 2025-09-02 ENCOUNTER — OFFICE VISIT (OUTPATIENT)
Age: 60
End: 2025-09-02
Payer: COMMERCIAL

## 2025-09-02 VITALS
WEIGHT: 153 LBS | DIASTOLIC BLOOD PRESSURE: 70 MMHG | SYSTOLIC BLOOD PRESSURE: 120 MMHG | OXYGEN SATURATION: 97 % | RESPIRATION RATE: 16 BRPM | HEART RATE: 92 BPM | BODY MASS INDEX: 22.58 KG/M2 | TEMPERATURE: 98.8 F

## 2025-09-02 DIAGNOSIS — F33.3 MDD (MAJOR DEPRESSIVE DISORDER), RECURRENT, SEVERE, WITH PSYCHOSIS (HCC): ICD-10-CM

## 2025-09-02 DIAGNOSIS — Z21 HIV INFECTION, UNSPECIFIED SYMPTOM STATUS (HCC): Primary | ICD-10-CM

## 2025-09-02 DIAGNOSIS — F19.10 POLYSUBSTANCE ABUSE (HCC): ICD-10-CM

## 2025-09-02 DIAGNOSIS — Z72.0 NICOTINE ABUSE: ICD-10-CM

## 2025-09-02 PROCEDURE — 99214 OFFICE O/P EST MOD 30 MIN: CPT | Performed by: INTERNAL MEDICINE

## 2025-09-02 ASSESSMENT — PATIENT HEALTH QUESTIONNAIRE - PHQ9
SUM OF ALL RESPONSES TO PHQ QUESTIONS 1-9: 0
1. LITTLE INTEREST OR PLEASURE IN DOING THINGS: NOT AT ALL
SUM OF ALL RESPONSES TO PHQ QUESTIONS 1-9: 0
2. FEELING DOWN, DEPRESSED OR HOPELESS: NOT AT ALL
SUM OF ALL RESPONSES TO PHQ QUESTIONS 1-9: 0
SUM OF ALL RESPONSES TO PHQ QUESTIONS 1-9: 0

## 2025-09-03 PROBLEM — Z72.0 NICOTINE ABUSE: Status: ACTIVE | Noted: 2025-09-03

## 2025-09-03 ASSESSMENT — ENCOUNTER SYMPTOMS: TROUBLE SWALLOWING: 1

## 2025-09-04 DIAGNOSIS — Z21 HIV INFECTION, UNSPECIFIED SYMPTOM STATUS (HCC): ICD-10-CM

## 2025-09-04 RX ORDER — ABACAVIR SULFATE, DOLUTEGRAVIR SODIUM, LAMIVUDINE 600; 50; 300 MG/1; MG/1; MG/1
TABLET, FILM COATED ORAL
Qty: 30 TABLET | Refills: 2 | Status: ACTIVE | OUTPATIENT
Start: 2025-09-04